# Patient Record
Sex: FEMALE | Race: BLACK OR AFRICAN AMERICAN | Employment: OTHER | URBAN - METROPOLITAN AREA
[De-identification: names, ages, dates, MRNs, and addresses within clinical notes are randomized per-mention and may not be internally consistent; named-entity substitution may affect disease eponyms.]

---

## 2017-02-09 DIAGNOSIS — I10 ESSENTIAL HYPERTENSION: Primary | ICD-10-CM

## 2017-02-10 RX ORDER — DILTIAZEM HYDROCHLORIDE 240 MG/1
CAPSULE, EXTENDED RELEASE ORAL
Qty: 90 CAPSULE | Refills: 5 | Status: SHIPPED | OUTPATIENT
Start: 2017-02-10 | End: 2018-05-14 | Stop reason: SDUPTHER

## 2017-04-12 ENCOUNTER — OFFICE VISIT (OUTPATIENT)
Dept: PRIMARY CARE CLINIC | Age: 77
End: 2017-04-12

## 2017-04-12 VITALS
WEIGHT: 203 LBS | BODY MASS INDEX: 37.13 KG/M2 | TEMPERATURE: 98.7 F | HEART RATE: 76 BPM | RESPIRATION RATE: 18 BRPM | SYSTOLIC BLOOD PRESSURE: 137 MMHG | DIASTOLIC BLOOD PRESSURE: 82 MMHG | OXYGEN SATURATION: 95 %

## 2017-04-12 DIAGNOSIS — Z83.3 FAMILY HISTORY OF TYPE 2 DIABETES MELLITUS: ICD-10-CM

## 2017-04-12 DIAGNOSIS — R07.89 OTHER CHEST PAIN: Primary | ICD-10-CM

## 2017-04-12 DIAGNOSIS — E78.2 MIXED HYPERLIPIDEMIA: ICD-10-CM

## 2017-04-12 DIAGNOSIS — Z23 NEED FOR TDAP VACCINATION: ICD-10-CM

## 2017-04-12 DIAGNOSIS — R07.89 OTHER CHEST PAIN: ICD-10-CM

## 2017-04-12 DIAGNOSIS — G45.9 TRANSIENT CEREBRAL ISCHEMIA, UNSPECIFIED TYPE: ICD-10-CM

## 2017-04-12 LAB
A/G RATIO: 1.4 (ref 1.1–2.2)
ALBUMIN SERPL-MCNC: 4.3 G/DL (ref 3.4–5)
ALP BLD-CCNC: 69 U/L (ref 40–129)
ALT SERPL-CCNC: 23 U/L (ref 10–40)
ANION GAP SERPL CALCULATED.3IONS-SCNC: 17 MMOL/L (ref 3–16)
AST SERPL-CCNC: 29 U/L (ref 15–37)
BILIRUB SERPL-MCNC: 0.6 MG/DL (ref 0–1)
BILIRUBIN URINE: NEGATIVE
BLOOD, URINE: NEGATIVE
BUN BLDV-MCNC: 13 MG/DL (ref 7–20)
CALCIUM SERPL-MCNC: 9.4 MG/DL (ref 8.3–10.6)
CHLORIDE BLD-SCNC: 99 MMOL/L (ref 99–110)
CLARITY: CLEAR
CO2: 23 MMOL/L (ref 21–32)
COLOR: YELLOW
CREAT SERPL-MCNC: 1.1 MG/DL (ref 0.6–1.2)
EPITHELIAL CELLS, UA: 2 /HPF (ref 0–5)
GFR AFRICAN AMERICAN: 58
GFR NON-AFRICAN AMERICAN: 48
GLOBULIN: 3.1 G/DL
GLUCOSE BLD-MCNC: 86 MG/DL (ref 70–99)
GLUCOSE URINE: NEGATIVE MG/DL
HYALINE CASTS: 4 /LPF (ref 0–8)
KETONES, URINE: ABNORMAL MG/DL
LEUKOCYTE ESTERASE, URINE: ABNORMAL
MICROSCOPIC EXAMINATION: YES
NITRITE, URINE: NEGATIVE
PH UA: 6
POTASSIUM SERPL-SCNC: 4.2 MMOL/L (ref 3.5–5.1)
PROTEIN UA: NEGATIVE MG/DL
RBC UA: 2 /HPF (ref 0–4)
SODIUM BLD-SCNC: 139 MMOL/L (ref 136–145)
SPECIFIC GRAVITY UA: 1.02
TOTAL PROTEIN: 7.4 G/DL (ref 6.4–8.2)
TSH REFLEX FT4: 1.4 UIU/ML (ref 0.27–4.2)
URINE TYPE: ABNORMAL
UROBILINOGEN, URINE: 0.2 E.U./DL
WBC UA: 1 /HPF (ref 0–5)

## 2017-04-12 PROCEDURE — 93000 ELECTROCARDIOGRAM COMPLETE: CPT | Performed by: INTERNAL MEDICINE

## 2017-04-12 PROCEDURE — 99214 OFFICE O/P EST MOD 30 MIN: CPT | Performed by: INTERNAL MEDICINE

## 2017-04-12 RX ORDER — FLUVASTATIN 20 MG/1
20 CAPSULE ORAL NIGHTLY
Qty: 30 CAPSULE | Refills: 5 | Status: SHIPPED | OUTPATIENT
Start: 2017-04-12 | End: 2017-04-19 | Stop reason: SDUPTHER

## 2017-04-12 RX ORDER — RANITIDINE 150 MG/1
150 TABLET ORAL 2 TIMES DAILY
Qty: 60 TABLET | Refills: 3 | Status: SHIPPED | OUTPATIENT
Start: 2017-04-12 | End: 2017-04-26 | Stop reason: SDUPTHER

## 2017-04-13 LAB
BASOPHILS ABSOLUTE: 0 K/UL (ref 0–0.2)
BASOPHILS RELATIVE PERCENT: 0.4 %
EOSINOPHILS ABSOLUTE: 0.1 K/UL (ref 0–0.6)
EOSINOPHILS RELATIVE PERCENT: 1.4 %
ESTIMATED AVERAGE GLUCOSE: 122.6 MG/DL
HBA1C MFR BLD: 5.9 %
HCT VFR BLD CALC: 40.8 % (ref 36–48)
HEMOGLOBIN: 13.2 G/DL (ref 12–16)
LYMPHOCYTES ABSOLUTE: 2.2 K/UL (ref 1–5.1)
LYMPHOCYTES RELATIVE PERCENT: 42.4 %
MCH RBC QN AUTO: 28.2 PG (ref 26–34)
MCHC RBC AUTO-ENTMCNC: 32.4 G/DL (ref 31–36)
MCV RBC AUTO: 87.1 FL (ref 80–100)
MONOCYTES ABSOLUTE: 0.4 K/UL (ref 0–1.3)
MONOCYTES RELATIVE PERCENT: 6.9 %
NEUTROPHILS ABSOLUTE: 2.6 K/UL (ref 1.7–7.7)
NEUTROPHILS RELATIVE PERCENT: 48.9 %
PDW BLD-RTO: 13.8 % (ref 12.4–15.4)
PLATELET # BLD: 272 K/UL (ref 135–450)
PMV BLD AUTO: 8.5 FL (ref 5–10.5)
RBC # BLD: 4.69 M/UL (ref 4–5.2)
WBC # BLD: 5.3 K/UL (ref 4–11)

## 2017-04-16 RX ORDER — ASPIRIN 81 MG/1
81 TABLET ORAL DAILY
Qty: 30 TABLET | Refills: 5 | Status: SHIPPED | OUTPATIENT
Start: 2017-04-16 | End: 2017-05-25

## 2017-04-16 ASSESSMENT — ENCOUNTER SYMPTOMS
DIARRHEA: 0
SHORTNESS OF BREATH: 0
EYE PAIN: 0
RHINORRHEA: 0
COUGH: 0
EYE ITCHING: 0
SORE THROAT: 0
ABDOMINAL PAIN: 0
WHEEZING: 0
VOMITING: 0
CONSTIPATION: 0
NAUSEA: 0
SINUS PRESSURE: 0
COLOR CHANGE: 0
CHEST TIGHTNESS: 1
TROUBLE SWALLOWING: 0

## 2017-04-17 ENCOUNTER — HOSPITAL ENCOUNTER (OUTPATIENT)
Dept: CT IMAGING | Age: 77
Discharge: HOME | End: 2017-04-17
Attending: INTERNAL MEDICINE | Admitting: INTERNAL MEDICINE

## 2017-04-17 DIAGNOSIS — G45.9 TRANSIENT CEREBRAL ISCHEMIA, UNSPECIFIED TYPE: Primary | ICD-10-CM

## 2017-04-17 DIAGNOSIS — R07.89 OTHER CHEST PAIN: ICD-10-CM

## 2017-04-19 DIAGNOSIS — E78.2 MIXED HYPERLIPIDEMIA: ICD-10-CM

## 2017-04-19 RX ORDER — FLUVASTATIN 20 MG/1
20 CAPSULE ORAL NIGHTLY
Qty: 30 CAPSULE | Refills: 5 | Status: SHIPPED | OUTPATIENT
Start: 2017-04-19 | End: 2018-04-11

## 2017-04-24 ENCOUNTER — TELEPHONE (OUTPATIENT)
Dept: PRIMARY CARE CLINIC | Age: 77
End: 2017-04-24

## 2017-04-26 ENCOUNTER — TELEPHONE (OUTPATIENT)
Dept: PRIMARY CARE CLINIC | Age: 77
End: 2017-04-26

## 2017-04-26 DIAGNOSIS — R07.89 OTHER CHEST PAIN: ICD-10-CM

## 2017-04-26 RX ORDER — RANITIDINE 150 MG/1
150 TABLET ORAL 2 TIMES DAILY
Qty: 180 TABLET | Refills: 1 | Status: SHIPPED | OUTPATIENT
Start: 2017-04-26 | End: 2017-04-27 | Stop reason: SDUPTHER

## 2017-04-26 RX ORDER — SPIRONOLACTONE 25 MG/1
25 TABLET ORAL DAILY
Qty: 90 TABLET | Refills: 1 | Status: SHIPPED | OUTPATIENT
Start: 2017-04-26 | End: 2017-04-27 | Stop reason: SDUPTHER

## 2017-04-27 DIAGNOSIS — R07.89 OTHER CHEST PAIN: ICD-10-CM

## 2017-04-27 DIAGNOSIS — R06.09 DYSPNEA ON EXERTION: Primary | ICD-10-CM

## 2017-04-27 RX ORDER — RANITIDINE 150 MG/1
150 TABLET ORAL 2 TIMES DAILY
Qty: 180 TABLET | Refills: 1 | Status: SHIPPED | OUTPATIENT
Start: 2017-04-27 | End: 2018-04-11

## 2017-04-27 RX ORDER — SPIRONOLACTONE 25 MG/1
25 TABLET ORAL DAILY
Qty: 90 TABLET | Refills: 1 | Status: SHIPPED | OUTPATIENT
Start: 2017-04-27 | End: 2018-05-09 | Stop reason: SDUPTHER

## 2017-05-04 ENCOUNTER — HOSPITAL ENCOUNTER (OUTPATIENT)
Dept: NON INVASIVE DIAGNOSTICS | Age: 77
Discharge: HOME | End: 2017-05-04
Attending: INTERNAL MEDICINE | Admitting: INTERNAL MEDICINE

## 2017-05-04 DIAGNOSIS — R06.09 OTHER FORMS OF DYSPNEA: ICD-10-CM

## 2017-05-04 LAB
LV EF: 58 %
LVEF MODALITY: NORMAL

## 2017-05-05 ENCOUNTER — TELEPHONE (OUTPATIENT)
Dept: PRIMARY CARE CLINIC | Age: 77
End: 2017-05-05

## 2017-05-22 ENCOUNTER — TELEPHONE (OUTPATIENT)
Dept: PRIMARY CARE CLINIC | Age: 77
End: 2017-05-22

## 2017-05-24 ENCOUNTER — TELEPHONE (OUTPATIENT)
Dept: PRIMARY CARE CLINIC | Age: 77
End: 2017-05-24

## 2017-05-24 ENCOUNTER — OFFICE VISIT (OUTPATIENT)
Dept: PRIMARY CARE CLINIC | Age: 77
End: 2017-05-24

## 2017-05-24 VITALS
SYSTOLIC BLOOD PRESSURE: 129 MMHG | RESPIRATION RATE: 18 BRPM | BODY MASS INDEX: 37.49 KG/M2 | WEIGHT: 205 LBS | HEART RATE: 70 BPM | DIASTOLIC BLOOD PRESSURE: 76 MMHG | OXYGEN SATURATION: 97 % | TEMPERATURE: 97.1 F

## 2017-05-24 DIAGNOSIS — N28.9 RENAL INSUFFICIENCY: ICD-10-CM

## 2017-05-24 DIAGNOSIS — I20.9 ISCHEMIC CHEST PAIN (HCC): ICD-10-CM

## 2017-05-24 DIAGNOSIS — R13.13 PHARYNGEAL DYSPHAGIA: ICD-10-CM

## 2017-05-24 DIAGNOSIS — N20.0 KIDNEY STONE ON LEFT SIDE: Primary | ICD-10-CM

## 2017-05-24 DIAGNOSIS — I27.20 PULMONARY HYPERTENSION (HCC): ICD-10-CM

## 2017-05-24 PROCEDURE — 99214 OFFICE O/P EST MOD 30 MIN: CPT | Performed by: INTERNAL MEDICINE

## 2017-05-25 ENCOUNTER — OFFICE VISIT (OUTPATIENT)
Dept: CARDIOLOGY CLINIC | Age: 77
End: 2017-05-25

## 2017-05-25 VITALS
WEIGHT: 204 LBS | DIASTOLIC BLOOD PRESSURE: 60 MMHG | HEART RATE: 70 BPM | SYSTOLIC BLOOD PRESSURE: 104 MMHG | BODY MASS INDEX: 37.31 KG/M2

## 2017-05-25 DIAGNOSIS — I10 ESSENTIAL HYPERTENSION: Primary | ICD-10-CM

## 2017-05-25 DIAGNOSIS — Z01.810 PREOP CARDIOVASCULAR EXAM: ICD-10-CM

## 2017-05-25 PROCEDURE — 99203 OFFICE O/P NEW LOW 30 MIN: CPT | Performed by: INTERNAL MEDICINE

## 2017-05-25 PROCEDURE — 93000 ELECTROCARDIOGRAM COMPLETE: CPT | Performed by: INTERNAL MEDICINE

## 2017-05-26 ENCOUNTER — HOSPITAL ENCOUNTER (OUTPATIENT)
Dept: VASCULAR LAB | Age: 77
Discharge: HOME | End: 2017-05-26
Attending: INTERNAL MEDICINE | Admitting: INTERNAL MEDICINE

## 2017-05-26 DIAGNOSIS — I27.20 PULMONARY HYPERTENSION (HCC): ICD-10-CM

## 2017-05-26 DIAGNOSIS — N20.0 KIDNEY STONE ON LEFT SIDE: ICD-10-CM

## 2017-05-26 DIAGNOSIS — N20.0 CALCULUS OF KIDNEY: ICD-10-CM

## 2017-05-26 DIAGNOSIS — R60.0 BILATERAL LEG EDEMA: Primary | ICD-10-CM

## 2017-05-30 ASSESSMENT — ENCOUNTER SYMPTOMS
RHINORRHEA: 0
EYE ITCHING: 0
FLATUS: 1
SHORTNESS OF BREATH: 0
HOARSE VOICE: 1
DIARRHEA: 0
BACK PAIN: 1
HEMOPTYSIS: 0
WHEEZING: 0
ABDOMINAL PAIN: 0
CHEST TIGHTNESS: 1
CONSTIPATION: 0
EYE PAIN: 0
ORTHOPNEA: 0
COUGH: 0
TROUBLE SWALLOWING: 0
COLOR CHANGE: 0
STRIDOR: 0
BELCHING: 0
NAUSEA: 0
SINUS PRESSURE: 0
HEMATOCHEZIA: 0
VOMITING: 0
WATER BRASH: 0
SORE THROAT: 0

## 2017-05-30 ASSESSMENT — PATIENT HEALTH QUESTIONNAIRE - PHQ9
2. FEELING DOWN, DEPRESSED OR HOPELESS: 0
SUM OF ALL RESPONSES TO PHQ9 QUESTIONS 1 & 2: 0
1. LITTLE INTEREST OR PLEASURE IN DOING THINGS: 0
SUM OF ALL RESPONSES TO PHQ QUESTIONS 1-9: 0

## 2017-06-01 DIAGNOSIS — N28.9 RENAL INSUFFICIENCY: ICD-10-CM

## 2017-06-01 LAB
ALBUMIN SERPL-MCNC: 4.3 G/DL (ref 3.4–5)
ANION GAP SERPL CALCULATED.3IONS-SCNC: 16 MMOL/L (ref 3–16)
BUN BLDV-MCNC: 23 MG/DL (ref 7–20)
CALCIUM SERPL-MCNC: 8.8 MG/DL (ref 8.3–10.6)
CHLORIDE BLD-SCNC: 101 MMOL/L (ref 99–110)
CO2: 23 MMOL/L (ref 21–32)
CREAT SERPL-MCNC: 1.3 MG/DL (ref 0.6–1.2)
GFR AFRICAN AMERICAN: 48
GFR NON-AFRICAN AMERICAN: 40
GLUCOSE BLD-MCNC: 81 MG/DL (ref 70–99)
PHOSPHORUS: 3 MG/DL (ref 2.5–4.9)
POTASSIUM SERPL-SCNC: 4.8 MMOL/L (ref 3.5–5.1)
SODIUM BLD-SCNC: 140 MMOL/L (ref 136–145)

## 2017-06-04 DIAGNOSIS — N28.9 RENAL INSUFFICIENCY: Primary | ICD-10-CM

## 2017-06-07 DIAGNOSIS — N28.9 RENAL INSUFFICIENCY: Primary | ICD-10-CM

## 2017-11-17 ENCOUNTER — TELEPHONE (OUTPATIENT)
Dept: PRIMARY CARE CLINIC | Age: 77
End: 2017-11-17

## 2017-11-17 DIAGNOSIS — F43.21 GRIEF: Primary | ICD-10-CM

## 2017-11-17 DIAGNOSIS — R73.03 PREDIABETES: Primary | ICD-10-CM

## 2017-11-17 DIAGNOSIS — I10 ESSENTIAL HYPERTENSION: ICD-10-CM

## 2017-11-17 DIAGNOSIS — E55.9 VITAMIN D DEFICIENCY: ICD-10-CM

## 2017-11-17 RX ORDER — CITALOPRAM 10 MG/1
10 TABLET ORAL DAILY
Qty: 30 TABLET | Refills: 3 | Status: SHIPPED | OUTPATIENT
Start: 2017-11-17 | End: 2017-12-07

## 2017-11-17 NOTE — TELEPHONE ENCOUNTER
Patient calling in today and would like to see DR. Zoraida Crandall for ov to discuss about her  passing last night and needs to come in to talk about it. Also she could go do blood work ahead of time before appt. cb patient if can be fitted in next week.  cb pt

## 2017-11-18 LAB
BACTERIA: ABNORMAL /HPF
EPITHELIAL CELLS, UA: 3 /HPF (ref 0–5)
HYALINE CASTS: 0 /LPF (ref 0–8)
RBC UA: 3 /HPF (ref 0–4)
WBC UA: 8 /HPF (ref 0–5)

## 2017-11-22 ENCOUNTER — TELEPHONE (OUTPATIENT)
Dept: PRIMARY CARE CLINIC | Age: 77
End: 2017-11-22

## 2017-11-22 RX ORDER — SERTRALINE HYDROCHLORIDE 25 MG/1
25 TABLET, FILM COATED ORAL DAILY
Qty: 30 TABLET | Refills: 3 | Status: SHIPPED | OUTPATIENT
Start: 2017-11-22 | End: 2018-04-11

## 2017-11-22 NOTE — TELEPHONE ENCOUNTER
Patient states she is afraid to take citalopram (CELEXA) 10 MG tablet and would like to know if you would prescribe something different.

## 2017-11-22 NOTE — TELEPHONE ENCOUNTER
Patient reports that her family through away her Celexa prescription because patient reports the pharmacist said that it might cause suicidal ideation and felt stop it abruptly. I explained the patient that if he stopped it abruptly after being on it a long time you might get vertigo no serious side effects and explain it was a 10 I want her to take it the prescription has been thrown away. Patient is going to grief reaction over the death of her . We'll switch to Zoloft and this was sent to pharmacy.

## 2017-12-07 ENCOUNTER — TELEPHONE (OUTPATIENT)
Dept: PRIMARY CARE CLINIC | Age: 77
End: 2017-12-07

## 2017-12-07 DIAGNOSIS — B96.89 ACUTE BRONCHITIS, BACTERIAL: Primary | ICD-10-CM

## 2017-12-07 DIAGNOSIS — J20.8 ACUTE BRONCHITIS, BACTERIAL: Primary | ICD-10-CM

## 2017-12-07 RX ORDER — AZITHROMYCIN 250 MG/1
250 TABLET, FILM COATED ORAL DAILY
Qty: 10 TABLET | Refills: 0 | Status: SHIPPED | OUTPATIENT
Start: 2017-12-07 | End: 2017-12-17

## 2018-04-05 ENCOUNTER — TELEPHONE (OUTPATIENT)
Dept: PRIMARY CARE CLINIC | Age: 78
End: 2018-04-05

## 2018-04-05 DIAGNOSIS — R73.03 PREDIABETES: ICD-10-CM

## 2018-04-05 DIAGNOSIS — E78.2 MIXED HYPERLIPIDEMIA: ICD-10-CM

## 2018-04-05 DIAGNOSIS — E55.9 VITAMIN D DEFICIENCY: Primary | ICD-10-CM

## 2018-04-09 DIAGNOSIS — E78.2 MIXED HYPERLIPIDEMIA: ICD-10-CM

## 2018-04-09 DIAGNOSIS — R73.03 PREDIABETES: ICD-10-CM

## 2018-04-09 DIAGNOSIS — E55.9 VITAMIN D DEFICIENCY: ICD-10-CM

## 2018-04-09 LAB
BASOPHILS ABSOLUTE: 0 K/UL (ref 0–0.2)
BASOPHILS RELATIVE PERCENT: 0.4 %
CREATININE URINE: 82.5 MG/DL (ref 28–259)
EOSINOPHILS ABSOLUTE: 0.1 K/UL (ref 0–0.6)
EOSINOPHILS RELATIVE PERCENT: 1.6 %
HCT VFR BLD CALC: 38.4 % (ref 36–48)
HEMOGLOBIN: 12.8 G/DL (ref 12–16)
LYMPHOCYTES ABSOLUTE: 3.4 K/UL (ref 1–5.1)
LYMPHOCYTES RELATIVE PERCENT: 50.3 %
MCH RBC QN AUTO: 27.7 PG (ref 26–34)
MCHC RBC AUTO-ENTMCNC: 33.4 G/DL (ref 31–36)
MCV RBC AUTO: 82.9 FL (ref 80–100)
MICROALBUMIN UR-MCNC: <1.2 MG/DL
MICROALBUMIN/CREAT UR-RTO: NORMAL MG/G (ref 0–30)
MONOCYTES ABSOLUTE: 0.5 K/UL (ref 0–1.3)
MONOCYTES RELATIVE PERCENT: 7.1 %
NEUTROPHILS ABSOLUTE: 2.8 K/UL (ref 1.7–7.7)
NEUTROPHILS RELATIVE PERCENT: 40.6 %
PDW BLD-RTO: 14.4 % (ref 12.4–15.4)
PLATELET # BLD: 316 K/UL (ref 135–450)
PMV BLD AUTO: 8 FL (ref 5–10.5)
RBC # BLD: 4.63 M/UL (ref 4–5.2)
WBC # BLD: 6.8 K/UL (ref 4–11)

## 2018-04-10 LAB
A/G RATIO: 1.4 (ref 1.1–2.2)
ALBUMIN SERPL-MCNC: 4.2 G/DL (ref 3.4–5)
ALP BLD-CCNC: 79 U/L (ref 40–129)
ALT SERPL-CCNC: 14 U/L (ref 10–40)
ANION GAP SERPL CALCULATED.3IONS-SCNC: 14 MMOL/L (ref 3–16)
AST SERPL-CCNC: 17 U/L (ref 15–37)
BILIRUB SERPL-MCNC: 0.5 MG/DL (ref 0–1)
BUN BLDV-MCNC: 14 MG/DL (ref 7–20)
CALCIUM SERPL-MCNC: 9.2 MG/DL (ref 8.3–10.6)
CHLORIDE BLD-SCNC: 102 MMOL/L (ref 99–110)
CHOLESTEROL, TOTAL: 265 MG/DL (ref 0–199)
CO2: 26 MMOL/L (ref 21–32)
CREAT SERPL-MCNC: 1.1 MG/DL (ref 0.6–1.2)
ESTIMATED AVERAGE GLUCOSE: 128.4 MG/DL
GFR AFRICAN AMERICAN: 58
GFR NON-AFRICAN AMERICAN: 48
GLOBULIN: 3.1 G/DL
GLUCOSE BLD-MCNC: 96 MG/DL (ref 70–99)
HBA1C MFR BLD: 6.1 %
HDLC SERPL-MCNC: 91 MG/DL (ref 40–60)
LDL CHOLESTEROL CALCULATED: 161 MG/DL
POTASSIUM SERPL-SCNC: 4.6 MMOL/L (ref 3.5–5.1)
SODIUM BLD-SCNC: 142 MMOL/L (ref 136–145)
TOTAL PROTEIN: 7.3 G/DL (ref 6.4–8.2)
TRIGL SERPL-MCNC: 67 MG/DL (ref 0–150)
TSH REFLEX FT4: 2.11 UIU/ML (ref 0.27–4.2)
VITAMIN D 25-HYDROXY: 20.9 NG/ML
VLDLC SERPL CALC-MCNC: 13 MG/DL

## 2018-04-11 ENCOUNTER — OFFICE VISIT (OUTPATIENT)
Dept: PRIMARY CARE CLINIC | Age: 78
End: 2018-04-11

## 2018-04-11 VITALS
TEMPERATURE: 98 F | OXYGEN SATURATION: 96 % | HEART RATE: 76 BPM | SYSTOLIC BLOOD PRESSURE: 115 MMHG | DIASTOLIC BLOOD PRESSURE: 74 MMHG | HEIGHT: 62 IN | WEIGHT: 193 LBS | BODY MASS INDEX: 35.51 KG/M2

## 2018-04-11 DIAGNOSIS — K64.9 HEMORRHOIDS, UNSPECIFIED HEMORRHOID TYPE: ICD-10-CM

## 2018-04-11 DIAGNOSIS — I10 ESSENTIAL HYPERTENSION: Primary | ICD-10-CM

## 2018-04-11 DIAGNOSIS — R07.81 RIB PAIN ON LEFT SIDE: ICD-10-CM

## 2018-04-11 DIAGNOSIS — E55.9 VITAMIN D DEFICIENCY: ICD-10-CM

## 2018-04-11 DIAGNOSIS — G89.29 CHRONIC LEFT SHOULDER PAIN: ICD-10-CM

## 2018-04-11 DIAGNOSIS — R25.2 MUSCLE CRAMPS: ICD-10-CM

## 2018-04-11 DIAGNOSIS — E78.2 MIXED HYPERLIPIDEMIA: ICD-10-CM

## 2018-04-11 DIAGNOSIS — M25.512 CHRONIC LEFT SHOULDER PAIN: ICD-10-CM

## 2018-04-11 DIAGNOSIS — Z12.31 ENCOUNTER FOR SCREENING MAMMOGRAM FOR BREAST CANCER: ICD-10-CM

## 2018-04-11 DIAGNOSIS — I27.20 PULMONARY HYPERTENSION (HCC): ICD-10-CM

## 2018-04-11 PROCEDURE — 99214 OFFICE O/P EST MOD 30 MIN: CPT | Performed by: INTERNAL MEDICINE

## 2018-04-11 RX ORDER — ACETAMINOPHEN 160 MG
1 TABLET,DISINTEGRATING ORAL DAILY
Qty: 30 CAPSULE | Refills: 5 | Status: SHIPPED | OUTPATIENT
Start: 2018-04-11 | End: 2020-06-23 | Stop reason: SDUPTHER

## 2018-04-12 ASSESSMENT — ENCOUNTER SYMPTOMS
SORE THROAT: 0
NAUSEA: 0
TROUBLE SWALLOWING: 0
CHEST TIGHTNESS: 0
WHEEZING: 0
EYE PAIN: 0
SHORTNESS OF BREATH: 0
COLOR CHANGE: 0
COUGH: 0
ABDOMINAL PAIN: 0
DIARRHEA: 0
SINUS PRESSURE: 0
BACK PAIN: 1
RHINORRHEA: 0
EYE ITCHING: 0
VOMITING: 0
CONSTIPATION: 0

## 2018-05-01 ENCOUNTER — HOSPITAL ENCOUNTER (OUTPATIENT)
Dept: OTHER | Age: 78
Discharge: HOME | End: 2018-05-01
Attending: INTERNAL MEDICINE | Admitting: INTERNAL MEDICINE

## 2018-05-01 DIAGNOSIS — R07.81 RIB PAIN ON LEFT SIDE: ICD-10-CM

## 2018-05-07 ENCOUNTER — HOSPITAL ENCOUNTER (OUTPATIENT)
Dept: NON INVASIVE DIAGNOSTICS | Age: 78
Discharge: HOME | End: 2018-05-07
Attending: INTERNAL MEDICINE | Admitting: INTERNAL MEDICINE

## 2018-05-07 DIAGNOSIS — I27.20 PULMONARY HYPERTENSION (HCC): ICD-10-CM

## 2018-05-07 LAB
LV EF: 63 %
LVEF MODALITY: NORMAL

## 2018-05-08 DIAGNOSIS — I34.0 MODERATE MITRAL INSUFFICIENCY: Primary | ICD-10-CM

## 2018-05-08 DIAGNOSIS — I27.20 PULMONARY HYPERTENSION (HCC): ICD-10-CM

## 2018-05-10 RX ORDER — SPIRONOLACTONE 25 MG/1
TABLET ORAL
Qty: 90 TABLET | Refills: 1 | Status: SHIPPED | OUTPATIENT
Start: 2018-05-10 | End: 2019-09-03 | Stop reason: SDUPTHER

## 2018-05-14 DIAGNOSIS — I10 ESSENTIAL HYPERTENSION: ICD-10-CM

## 2018-05-15 RX ORDER — DILTIAZEM HYDROCHLORIDE 240 MG/1
CAPSULE, EXTENDED RELEASE ORAL
Qty: 90 CAPSULE | Refills: 5 | Status: SHIPPED | OUTPATIENT
Start: 2018-05-15 | End: 2019-07-23 | Stop reason: SDUPTHER

## 2018-06-25 ENCOUNTER — OFFICE VISIT (OUTPATIENT)
Dept: PRIMARY CARE CLINIC | Age: 78
End: 2018-06-25

## 2018-06-25 VITALS
SYSTOLIC BLOOD PRESSURE: 144 MMHG | HEART RATE: 75 BPM | HEIGHT: 62 IN | RESPIRATION RATE: 16 BRPM | TEMPERATURE: 97.4 F | DIASTOLIC BLOOD PRESSURE: 83 MMHG | OXYGEN SATURATION: 98 % | WEIGHT: 196.4 LBS | BODY MASS INDEX: 36.14 KG/M2

## 2018-06-25 DIAGNOSIS — K64.9 HEMORRHOIDS, UNSPECIFIED HEMORRHOID TYPE: Primary | ICD-10-CM

## 2018-06-25 DIAGNOSIS — Z01.818 PRE-OP EXAM: ICD-10-CM

## 2018-06-25 PROCEDURE — 99203 OFFICE O/P NEW LOW 30 MIN: CPT | Performed by: FAMILY MEDICINE

## 2018-06-25 PROCEDURE — 93000 ELECTROCARDIOGRAM COMPLETE: CPT | Performed by: FAMILY MEDICINE

## 2018-06-25 RX ORDER — POLYETHYLENE GLYCOL 3350 17 G/17G
17 POWDER, FOR SOLUTION ORAL DAILY
COMMUNITY
End: 2019-08-09

## 2018-08-10 ENCOUNTER — OFFICE VISIT (OUTPATIENT)
Dept: PRIMARY CARE CLINIC | Age: 78
End: 2018-08-10

## 2018-08-10 VITALS
OXYGEN SATURATION: 98 % | RESPIRATION RATE: 18 BRPM | HEART RATE: 78 BPM | DIASTOLIC BLOOD PRESSURE: 77 MMHG | SYSTOLIC BLOOD PRESSURE: 136 MMHG | BODY MASS INDEX: 35.48 KG/M2 | TEMPERATURE: 98 F | WEIGHT: 194 LBS

## 2018-08-10 DIAGNOSIS — R73.03 PREDIABETES: ICD-10-CM

## 2018-08-10 DIAGNOSIS — E78.2 MIXED HYPERLIPIDEMIA: ICD-10-CM

## 2018-08-10 DIAGNOSIS — N20.0 RENAL CALCULUS: ICD-10-CM

## 2018-08-10 DIAGNOSIS — I10 ESSENTIAL HYPERTENSION: Primary | ICD-10-CM

## 2018-08-10 DIAGNOSIS — E55.9 VITAMIN D DEFICIENCY: ICD-10-CM

## 2018-08-10 PROCEDURE — 99214 OFFICE O/P EST MOD 30 MIN: CPT | Performed by: INTERNAL MEDICINE

## 2018-08-10 RX ORDER — OXYCODONE HYDROCHLORIDE AND ACETAMINOPHEN 5; 325 MG/1; MG/1
TABLET ORAL
COMMUNITY
Start: 2018-07-10 | End: 2019-08-09

## 2018-08-10 ASSESSMENT — PATIENT HEALTH QUESTIONNAIRE - PHQ9
2. FEELING DOWN, DEPRESSED OR HOPELESS: 0
SUM OF ALL RESPONSES TO PHQ9 QUESTIONS 1 & 2: 0
SUM OF ALL RESPONSES TO PHQ QUESTIONS 1-9: 0
1. LITTLE INTEREST OR PLEASURE IN DOING THINGS: 0
SUM OF ALL RESPONSES TO PHQ QUESTIONS 1-9: 0

## 2018-08-11 DIAGNOSIS — E78.2 MIXED HYPERLIPIDEMIA: ICD-10-CM

## 2018-08-11 LAB
CHOLESTEROL, TOTAL: 262 MG/DL (ref 0–199)
HDLC SERPL-MCNC: 76 MG/DL (ref 40–60)
LDL CHOLESTEROL CALCULATED: 174 MG/DL
TRIGL SERPL-MCNC: 60 MG/DL (ref 0–150)
VLDLC SERPL CALC-MCNC: 12 MG/DL

## 2018-08-14 ASSESSMENT — ENCOUNTER SYMPTOMS
SHORTNESS OF BREATH: 0
SORE THROAT: 0
CONSTIPATION: 0
WHEEZING: 0
TROUBLE SWALLOWING: 0
DIARRHEA: 0
EYE ITCHING: 0
VOMITING: 0
EYE PAIN: 0
ABDOMINAL PAIN: 0
BACK PAIN: 0
COUGH: 0
RHINORRHEA: 0
NAUSEA: 0
COLOR CHANGE: 0
SINUS PRESSURE: 0
CHEST TIGHTNESS: 0

## 2018-08-14 NOTE — PROGRESS NOTES
 Internal hemorrhoid, bleeding    Liver mass    Screening mammogram, encounter for    Renal calculus    Sciatica    Asthma exacerbation    Mixed hyperlipidemia    Bilateral carpal tunnel syndrome    Essential hypertension    Pulmonary hypertension (HCC)    Preop cardiovascular exam    Moderate mitral insufficiency       Review of Systems   Constitutional: Negative for activity change, appetite change, chills, diaphoresis, fatigue, fever and unexpected weight change. HENT: Negative for congestion, dental problem, drooling, ear discharge, ear pain, hearing loss, nosebleeds, postnasal drip, rhinorrhea, sinus pressure, sneezing, sore throat and trouble swallowing. Eyes: Negative for pain, itching and visual disturbance. Respiratory: Negative for cough, chest tightness, shortness of breath and wheezing. Long history of asthma. Cardiovascular: Negative for chest pain, palpitations and leg swelling. Long history of htn and hyperlipidemia. Gastrointestinal: Negative for abdominal pain, constipation, diarrhea, nausea and vomiting. Colonoscopy with Dr. Greyson Ocampo in March of 2011. Pt states that pain has decreased due to current ATB treatment. Endocrine: Negative for cold intolerance, heat intolerance, polydipsia, polyphagia and polyuria. Genitourinary: Negative for difficulty urinating, dysuria, flank pain, frequency, hematuria, menstrual problem, pelvic pain and vaginal bleeding. Urge incontinence. Complete hysterectomy in 1994,   Musculoskeletal: Negative for arthralgias, back pain, gait problem, myalgias, neck pain and neck stiffness. Osteoarthritis of knees doing well for two years after cartilage injection with Dr. Sena Cortes. Skin: Negative for color change, pallor, rash and wound. Allergic/Immunologic: Negative for environmental allergies and food allergies.    Neurological: Negative for dizziness, tremors, seizures, syncope,

## 2018-08-17 DIAGNOSIS — E78.2 MIXED HYPERLIPIDEMIA: Primary | ICD-10-CM

## 2018-08-17 RX ORDER — COLESEVELAM 180 1/1
1875 TABLET ORAL 2 TIMES DAILY WITH MEALS
Qty: 180 TABLET | Refills: 3 | Status: SHIPPED | OUTPATIENT
Start: 2018-08-17 | End: 2019-08-09 | Stop reason: SINTOL

## 2018-09-17 NOTE — PROGRESS NOTES
Disease Brother 65        cad       Allergies   Allergies   Allergen Reactions    Latex Itching    Crestor [Rosuvastatin Calcium] Other (See Comments)     Muscle pain    Pcn [Penicillins] Anaphylaxis    Eggs [Egg White]     Flagyl [Metronidazole] Diarrhea     Blood in stool    Lipitor      Muscle pain.  Motrin [Ibuprofen Micronized] Other (See Comments)     Head tightness    Nsaids     Sulfa Antibiotics      hypertension    Aspirin Nausea And Vomiting    Imidazole Antifungals Nausea And Vomiting and Other (See Comments)    Quinolones Nausea And Vomiting       Medications:     Home Medications:  Were reviewed and are listed in nursing record. and/or listed below    Prior to Admission medications    Medication Sig Start Date End Date Taking? Authorizing Provider   [facility]) 625 MG tablet Take 3 tablets by mouth 2 times daily (with meals) 8/17/18  Yes Danay Devries MD   oxyCODONE-acetaminophen (PERCOCET) 5-325 MG per tablet  7/10/18  Yes Historical Provider, MD   polyethylene glycol (MIRALAX) powder Take 17 g by mouth daily   Yes Historical Provider, MD   DILT- MG extended release capsule TAKE 1 CAPSULE DAILY 5/15/18  Yes Danay Devries MD   PROCTOZONE-HC 2.5 % rectal cream APPLY TWICE A DAY AS NEEDED 5/10/18  Yes Danay Devries MD   spironolactone (ALDACTONE) 25 MG tablet TAKE 1 TABLET DAILY 5/10/18  Yes Danay Devries MD   Cholecalciferol (VITAMIN D3) 2000 units CAPS Take 1 capsule by mouth daily If not covered , will buy over the counter. 4/11/18  Yes Danay Devries MD   pramoxine-zinc oxide in mineral oil (TUCKS HEMORRHOIDAL) 1-12.5 % OINT ointment Place rectally as needed (hemorrhoids flare) 12/13/16  Yes Danay Devries MD   multivitamin [facility]) per tablet Take 1 tablet by mouth daily.      Yes Historical Provider, MD        Review of Systems   Constitutional: Negative for activity change, appetite change, diaphoresis, fatigue, fever and unexpected weight change. HENT: Negative for congestion, facial swelling, mouth sores and nosebleeds. Eyes: Negative for discharge and visual disturbance. Respiratory: Negative for cough, chest tightness, shortness of breath and wheezing. Cardiovascular: Negative for chest pain, palpitations and leg swelling. Gastrointestinal: Negative for abdominal distention, abdominal pain, blood in stool and vomiting. Endocrine: Negative for cold intolerance, heat intolerance and polyuria. Genitourinary: Negative for difficulty urinating, dysuria, frequency and hematuria. Musculoskeletal: Negative for back pain, joint swelling, myalgias and neck pain. Skin: Negative for color change, pallor and rash. Allergic/Immunologic: Negative for immunocompromised state. Neurological: Negative for dizziness, syncope, weakness, light-headedness, numbness and headaches. Hematological: Negative for adenopathy. Does not bruise/bleed easily. Psychiatric/Behavioral: Negative for behavioral problems, confusion, decreased concentration and suicidal ideas. The patient is not nervous/anxious. Vitals:    09/18/18 1111   BP: 120/60   Pulse: 60    Weight: 195 lb 12.8 oz (88.8 kg)       Vitals:    09/18/18 1111   BP: 120/60   Pulse: 60   Weight: 195 lb 12.8 oz (88.8 kg)       BP Readings from Last 3 Encounters:   09/18/18 120/60   08/10/18 136/77   06/25/18 (!) 144/83       Wt Readings from Last 3 Encounters:   09/18/18 195 lb 12.8 oz (88.8 kg)   08/10/18 194 lb (88 kg)   06/25/18 196 lb 6.4 oz (89.1 kg)       Physical Exam   Constitutional: She is oriented to person, place, and time. She appears well-developed and well-nourished. No distress. HENT:   Head: Normocephalic and atraumatic. Eyes: Pupils are equal, round, and reactive to light. EOM are normal.   Neck: Normal range of motion. No JVD present. No thyromegaly present.    Cardiovascular: Normal rate, regular rhythm, S1 normal, S2 normal, normal heart

## 2018-09-18 ENCOUNTER — OFFICE VISIT (OUTPATIENT)
Dept: CARDIOLOGY CLINIC | Age: 78
End: 2018-09-18

## 2018-09-18 VITALS
DIASTOLIC BLOOD PRESSURE: 60 MMHG | BODY MASS INDEX: 35.81 KG/M2 | SYSTOLIC BLOOD PRESSURE: 120 MMHG | WEIGHT: 195.8 LBS | HEART RATE: 60 BPM

## 2018-09-18 DIAGNOSIS — I34.0 MODERATE MITRAL INSUFFICIENCY: ICD-10-CM

## 2018-09-18 DIAGNOSIS — I10 ESSENTIAL HYPERTENSION: ICD-10-CM

## 2018-09-18 PROCEDURE — 99204 OFFICE O/P NEW MOD 45 MIN: CPT | Performed by: INTERNAL MEDICINE

## 2018-09-18 ASSESSMENT — ENCOUNTER SYMPTOMS
EYE DISCHARGE: 0
BLOOD IN STOOL: 0
CHEST TIGHTNESS: 0
SHORTNESS OF BREATH: 0
WHEEZING: 0
VOMITING: 0
COUGH: 0
FACIAL SWELLING: 0
COLOR CHANGE: 0
BACK PAIN: 0
ABDOMINAL PAIN: 0
ABDOMINAL DISTENTION: 0

## 2018-09-26 PROBLEM — Z01.810 PREOP CARDIOVASCULAR EXAM: Status: RESOLVED | Noted: 2017-05-25 | Resolved: 2018-09-26

## 2019-03-18 ENCOUNTER — OFFICE VISIT (OUTPATIENT)
Dept: ORTHOPEDIC SURGERY | Age: 79
End: 2019-03-18
Payer: COMMERCIAL

## 2019-03-18 VITALS
HEART RATE: 68 BPM | BODY MASS INDEX: 34.55 KG/M2 | HEIGHT: 63 IN | DIASTOLIC BLOOD PRESSURE: 77 MMHG | WEIGHT: 195 LBS | SYSTOLIC BLOOD PRESSURE: 143 MMHG

## 2019-03-18 DIAGNOSIS — M12.812 ROTATOR CUFF ARTHROPATHY, LEFT: ICD-10-CM

## 2019-03-18 DIAGNOSIS — M25.512 LEFT SHOULDER PAIN, UNSPECIFIED CHRONICITY: Primary | ICD-10-CM

## 2019-03-18 PROCEDURE — 20610 DRAIN/INJ JOINT/BURSA W/O US: CPT | Performed by: PHYSICIAN ASSISTANT

## 2019-03-18 PROCEDURE — 99203 OFFICE O/P NEW LOW 30 MIN: CPT | Performed by: PHYSICIAN ASSISTANT

## 2019-03-18 NOTE — LETTER
Physical Therapy Rehabilitation Referral    Patient Name:  Christal Jin      YOB: 1940    Diagnosis:    1. Left shoulder pain, unspecified chronicity    2. Rotator cuff arthropathy, left        Precautions:     [x] Evaluate and Treat    Post Op Instructions:  [] Continuous passive motion (CPM) [] Elbow ROM  [x] Exercise in plane of scapula  []  Strengthening     [] Pulley and instruction   [x] Home exercise program (copy to patient)   [] Sling when arm at risk  [] Sling or brace at all times   [] AAROM: Forward elevation to  140            [] AAROM: External rotation  To  40    [] Isometric external rotator strengthening [] AAROM: internal rotation: up the back  [x] Isometric abductor strengthening  [] AAROM: Internal abduction   [] Isometric internal rotator strengthening [] AAROM: cross-body adduction             Stretching:     Strengthening:  [x] Four quadrant (FE, ER, IR, CBA)  [x] Rotator cuff (ER, IR, Abd)  [] Forward Elevation    [] External Rotators     [] External Rotation    [] Internal Rotators  [] Internal Rotation: up/back   [] Abductors     [] Internal Rotation: supine in abduction  [] Sleeper Stretch    [] Flexors  [] Cross-body abduction    [] Extensors  [] Pendulum (FE, Abd/Add, cw/ccw)  [x] Scapular Stabilizers   [] Wall-walking (FE, Abd)        [x] Shoulder shrugs     [] Table slides (FE)                [x] Rhomboid pinch  [] Elbow (flex, ext, pron, sup)        [] Lat.  Pull downs     [] Medial epicondylitis program       [] Forward punch   [] Lateral epicondylitis program       [] Internal rotators     [] Progressive resistive exercises  [] Bench Press        [] Bench press plus  Activities:     [] Lateral pull-downs  [] Rowing     [x] Progressive two-hand supine press  [] Stepper/Exercise bike   [x] Biceps: curls/supination  [] Swimming  [] Water exercises    Modalities:     Return to Sport:  [x] Of Choice      [] Plyometrics

## 2019-03-18 NOTE — PROGRESS NOTES
Depo-Medrol:  NDC: [id number]  Lot #: [id number]  Expiration Date: 1/2021    LIDOCAINE  NDC: 8893-4596-81  LOT: [id number]  EXP: 6/2020    Lt.  Shoulder

## 2019-04-08 ENCOUNTER — TELEPHONE (OUTPATIENT)
Dept: PRIMARY CARE CLINIC | Age: 79
End: 2019-04-08

## 2019-04-08 DIAGNOSIS — E55.9 VITAMIN D DEFICIENCY: ICD-10-CM

## 2019-04-08 DIAGNOSIS — I10 ESSENTIAL HYPERTENSION: Primary | ICD-10-CM

## 2019-04-08 DIAGNOSIS — R73.03 PREDIABETES: ICD-10-CM

## 2019-04-08 DIAGNOSIS — E78.2 MIXED HYPERLIPIDEMIA: ICD-10-CM

## 2019-04-08 DIAGNOSIS — I10 ESSENTIAL HYPERTENSION: ICD-10-CM

## 2019-04-08 DIAGNOSIS — I34.0 MODERATE MITRAL INSUFFICIENCY: ICD-10-CM

## 2019-04-08 LAB
A/G RATIO: 1.2 (ref 1.1–2.2)
ALBUMIN SERPL-MCNC: 3.8 G/DL (ref 3.4–5)
ALP BLD-CCNC: 75 U/L (ref 40–129)
ALT SERPL-CCNC: 12 U/L (ref 10–40)
ANION GAP SERPL CALCULATED.3IONS-SCNC: 13 MMOL/L (ref 3–16)
AST SERPL-CCNC: 13 U/L (ref 15–37)
BASOPHILS ABSOLUTE: 0 K/UL (ref 0–0.2)
BASOPHILS RELATIVE PERCENT: 0.4 %
BILIRUB SERPL-MCNC: 0.6 MG/DL (ref 0–1)
BILIRUBIN URINE: NEGATIVE
BLOOD, URINE: NEGATIVE
BUN BLDV-MCNC: 16 MG/DL (ref 7–20)
CALCIUM SERPL-MCNC: 9 MG/DL (ref 8.3–10.6)
CHLORIDE BLD-SCNC: 108 MMOL/L (ref 99–110)
CHOLESTEROL, TOTAL: 251 MG/DL (ref 0–199)
CLARITY: CLEAR
CO2: 24 MMOL/L (ref 21–32)
COLOR: YELLOW
CREAT SERPL-MCNC: 1 MG/DL (ref 0.6–1.2)
CREATININE URINE: 93.8 MG/DL (ref 28–259)
EOSINOPHILS ABSOLUTE: 0.1 K/UL (ref 0–0.6)
EOSINOPHILS RELATIVE PERCENT: 1.8 %
GFR AFRICAN AMERICAN: >60
GFR NON-AFRICAN AMERICAN: 54
GLOBULIN: 3.2 G/DL
GLUCOSE BLD-MCNC: 101 MG/DL (ref 70–99)
GLUCOSE URINE: NEGATIVE MG/DL
HCT VFR BLD CALC: 38.8 % (ref 36–48)
HDLC SERPL-MCNC: 86 MG/DL (ref 40–60)
HEMOGLOBIN: 12.7 G/DL (ref 12–16)
KETONES, URINE: NEGATIVE MG/DL
LDL CHOLESTEROL CALCULATED: 154 MG/DL
LEUKOCYTE ESTERASE, URINE: NEGATIVE
LYMPHOCYTES ABSOLUTE: 3.3 K/UL (ref 1–5.1)
LYMPHOCYTES RELATIVE PERCENT: 46.8 %
MCH RBC QN AUTO: 27.9 PG (ref 26–34)
MCHC RBC AUTO-ENTMCNC: 32.7 G/DL (ref 31–36)
MCV RBC AUTO: 85.3 FL (ref 80–100)
MICROALBUMIN UR-MCNC: <1.2 MG/DL
MICROALBUMIN/CREAT UR-RTO: NORMAL MG/G (ref 0–30)
MICROSCOPIC EXAMINATION: NORMAL
MONOCYTES ABSOLUTE: 0.5 K/UL (ref 0–1.3)
MONOCYTES RELATIVE PERCENT: 6.6 %
NEUTROPHILS ABSOLUTE: 3.1 K/UL (ref 1.7–7.7)
NEUTROPHILS RELATIVE PERCENT: 44.4 %
NITRITE, URINE: NEGATIVE
PDW BLD-RTO: 14.6 % (ref 12.4–15.4)
PH UA: 6 (ref 5–8)
PLATELET # BLD: 274 K/UL (ref 135–450)
PMV BLD AUTO: 8.3 FL (ref 5–10.5)
POTASSIUM SERPL-SCNC: 4.1 MMOL/L (ref 3.5–5.1)
PROTEIN UA: NEGATIVE MG/DL
RBC # BLD: 4.55 M/UL (ref 4–5.2)
SODIUM BLD-SCNC: 145 MMOL/L (ref 136–145)
SPECIFIC GRAVITY UA: 1.02 (ref 1–1.03)
TOTAL PROTEIN: 7 G/DL (ref 6.4–8.2)
TRIGL SERPL-MCNC: 54 MG/DL (ref 0–150)
TSH REFLEX FT4: 1.55 UIU/ML (ref 0.27–4.2)
URINE TYPE: NORMAL
UROBILINOGEN, URINE: 0.2 E.U./DL
VITAMIN B-12: 403 PG/ML (ref 211–911)
VITAMIN D 25-HYDROXY: 13.8 NG/ML
VLDLC SERPL CALC-MCNC: 11 MG/DL
WBC # BLD: 7 K/UL (ref 4–11)

## 2019-04-09 LAB
ESTIMATED AVERAGE GLUCOSE: 134.1 MG/DL
HBA1C MFR BLD: 6.3 %

## 2019-04-10 ENCOUNTER — OFFICE VISIT (OUTPATIENT)
Dept: PRIMARY CARE CLINIC | Age: 79
End: 2019-04-10
Payer: COMMERCIAL

## 2019-04-10 VITALS
SYSTOLIC BLOOD PRESSURE: 130 MMHG | WEIGHT: 195 LBS | OXYGEN SATURATION: 97 % | HEART RATE: 78 BPM | DIASTOLIC BLOOD PRESSURE: 75 MMHG | TEMPERATURE: 98 F | BODY MASS INDEX: 34.54 KG/M2 | RESPIRATION RATE: 18 BRPM

## 2019-04-10 DIAGNOSIS — I10 ESSENTIAL HYPERTENSION: ICD-10-CM

## 2019-04-10 DIAGNOSIS — Z00.00 ENCOUNTER FOR PREVENTATIVE ADULT HEALTH CARE EXAMINATION: Primary | ICD-10-CM

## 2019-04-10 DIAGNOSIS — E78.2 MIXED HYPERLIPIDEMIA: ICD-10-CM

## 2019-04-10 DIAGNOSIS — L02.412 ABSCESS OF AXILLA, LEFT: ICD-10-CM

## 2019-04-10 PROCEDURE — 99397 PER PM REEVAL EST PAT 65+ YR: CPT | Performed by: INTERNAL MEDICINE

## 2019-04-10 RX ORDER — FENOFIBRATE 160 MG/1
160 TABLET ORAL DAILY
Qty: 30 TABLET | Refills: 5 | Status: SHIPPED | OUTPATIENT
Start: 2019-04-10 | End: 2019-08-08 | Stop reason: SDUPTHER

## 2019-04-10 RX ORDER — EZETIMIBE 10 MG/1
10 TABLET ORAL DAILY
Qty: 30 TABLET | Refills: 3 | Status: SHIPPED | OUTPATIENT
Start: 2019-04-10 | End: 2019-08-08 | Stop reason: SDUPTHER

## 2019-04-10 RX ORDER — DOXYCYCLINE HYCLATE 100 MG
100 TABLET ORAL 2 TIMES DAILY
Qty: 20 TABLET | Refills: 0 | Status: SHIPPED | OUTPATIENT
Start: 2019-04-10 | End: 2019-04-20

## 2019-04-10 ASSESSMENT — ENCOUNTER SYMPTOMS
CONSTIPATION: 0
RHINORRHEA: 0
NAUSEA: 0
CHEST TIGHTNESS: 0
ABDOMINAL PAIN: 0
TROUBLE SWALLOWING: 0
EYE PAIN: 0
COLOR CHANGE: 0
WHEEZING: 0
COUGH: 0
SINUS PRESSURE: 0
SORE THROAT: 0
SHORTNESS OF BREATH: 0
DIARRHEA: 0
EYE ITCHING: 0
VOMITING: 0
BACK PAIN: 0

## 2019-04-10 ASSESSMENT — PATIENT HEALTH QUESTIONNAIRE - PHQ9
SUM OF ALL RESPONSES TO PHQ QUESTIONS 1-9: 0
SUM OF ALL RESPONSES TO PHQ9 QUESTIONS 1 & 2: 0
2. FEELING DOWN, DEPRESSED OR HOPELESS: 0
1. LITTLE INTEREST OR PLEASURE IN DOING THINGS: 0
SUM OF ALL RESPONSES TO PHQ QUESTIONS 1-9: 0

## 2019-04-10 NOTE — PROGRESS NOTES
4/10/2019    Della Hernandez (:  1940) is a 69 y.o. female, here for a preventive medicine evaluation. The 10-year ASCVD risk score (Shante Corea, et al., 2013) is: 25.4%    Values used to calculate the score:      Age: 69 years      Sex: Female      Is Non- : Yes      Diabetic: No      Tobacco smoker: No      Systolic Blood Pressure: 082 mmHg      Is BP treated: Yes      HDL Cholesterol: 86 mg/dL      Total Cholesterol: 251 mg/dL    Patient Active Problem List   Diagnosis    Prediabetes    Vitamin D deficiency    Internal hemorrhoid, bleeding    Liver mass    Renal calculus    Sciatica    Asthma exacerbation    Mixed hyperlipidemia    Bilateral carpal tunnel syndrome    Essential hypertension    Pulmonary hypertension (HCC)    Moderate mitral insufficiency       Review of Systems   Constitutional: Negative for activity change, appetite change, chills, diaphoresis, fatigue, fever and unexpected weight change. HENT: Negative for congestion, dental problem, drooling, ear discharge, ear pain, hearing loss, nosebleeds, postnasal drip, rhinorrhea, sinus pressure, sneezing, sore throat and trouble swallowing. Eyes: Negative for pain, itching and visual disturbance. Respiratory: Negative for cough, chest tightness, shortness of breath and wheezing. Long history of asthma. Cardiovascular: Negative for chest pain, palpitations and leg swelling. Long history of htn and hyperlipidemia. Gastrointestinal: Negative for abdominal pain, constipation, diarrhea, nausea and vomiting. Colonoscopy with Dr. Liudmila Foley in 2011. Pt states that pain has decreased due to current ATB treatment. Endocrine: Negative for cold intolerance, heat intolerance, polydipsia, polyphagia and polyuria.    Genitourinary: Negative for difficulty urinating, dysuria, flank pain, frequency, hematuria, menstrual problem, pelvic pain and vaginal Take 1 tablet by mouth daily. Yes Historical Provider, MD        Allergies   Allergen Reactions    Latex Itching    Crestor [Rosuvastatin Calcium] Other (See Comments)     Muscle pain    Pcn [Penicillins] Anaphylaxis    Eggs [Egg White]     Flagyl [Metronidazole] Diarrhea     Blood in stool    Lipitor      Muscle pain.  Motrin [Ibuprofen Micronized] Other (See Comments)     Head tightness    Nsaids     Sulfa Antibiotics      hypertension    Aspirin Nausea And Vomiting    Imidazole Antifungals Nausea And Vomiting and Other (See Comments)    Quinolones Nausea And Vomiting       Past Medical History:   Diagnosis Date    Allergic rhinitis     Asthma     Chronic back pain     COPD (chronic obstructive pulmonary disease) (HCC)     Fibromyalgia     GERD (gastroesophageal reflux disease)     Hyperlipidemia     Hypertension     Irritable bowel syndrome     Obesity     Osteoarthritis     Osteopenia     Renal calculus 2013    Restless legs syndrome     Sciatica 2014    Urinary incontinence        Past Surgical History:   Procedure Laterality Date    APPENDECTOMY      CHOLECYSTECTOMY      COLONOSCOPY       Dr Enriqueta Verdugo  07/10/2018    at [facility] done by Dr. Linda Kerr, [id number] [facility]       Social History     Socioeconomic History    Marital status:       Spouse name: Not on file    Number of children: Not on file    Years of education: Not on file    Highest education level: Not on file   Occupational History    Not on file   Social Needs    Financial resource strain: Not on file    Food insecurity:     Worry: Not on file     Inability: Not on file    Transportation needs:     Medical: Not on file     Non-medical: Not on file   Tobacco Use    Smoking status: Former Smoker     Last attempt to quit: 1972     Years since quittin.3    Smokeless tobacco: Never Used   Substance and Sexual Activity    Alcohol use: No    Drug use: No    Sexual activity: Never     Partners: Male   Lifestyle    Physical activity:     Days per week: Not on file     Minutes per session: Not on file    Stress: Not on file   Relationships    Social connections:     Talks on phone: Not on file     Gets together: Not on file     Attends [employer] service: Not on file     Active member of club or organization: Not on file     Attends meetings of clubs or organizations: Not on file     Relationship status: Not on file    Intimate partner violence:     Fear of current or ex partner: Not on file     Emotionally abused: Not on file     Physically abused: Not on file     Forced sexual activity: Not on file   Other Topics Concern    Not on file   Social History Narrative    Not on file        Family History   Problem Relation Age of Onset    Arthritis Mother     Cancer Mother     Diabetes Mother     Heart Disease Mother     High Blood Pressure Mother     Kidney Disease Mother     High Cholesterol Mother     Arthritis Father     Cancer Father     Diabetes Father     Heart Disease Father     High Blood Pressure Father     Kidney Disease Father     High Cholesterol Father     Heart Disease Brother 58        coronary artery disease with stent    Heart Disease Brother 65        cad       ADVANCE DIRECTIVE: Y, Not Received    Vitals:    04/10/19 1335   BP: 130/75   Pulse: 78   Resp: 18   Temp: 98 °F (36.7 °C)   TempSrc: Oral   SpO2: 97%   Weight: 195 lb (88.5 kg)     Estimated body mass index is 34.54 kg/m² as calculated from the following:    Height as of 3/18/19: 5' 3\" (1.6 m). Weight as of this encounter: 195 lb (88.5 kg). Physical Exam   Constitutional: She is oriented to person, place, and time. She appears well-developed and well-nourished. No distress. HENT:   Head: Normocephalic and atraumatic.    Right Ear: External ear normal.   Left Ear: External ear normal.   Nose: Nose normal. Mouth/Throat: No oropharyngeal exudate. Tm's intact. Eyes: Pupils are equal, round, and reactive to light. Conjunctivae are normal. Right eye exhibits no discharge. Left eye exhibits no discharge. Neck: Normal range of motion. Neck supple. No tracheal deviation present. No thyromegaly present. Cardiovascular: Normal rate, regular rhythm and intact distal pulses. Exam reveals no gallop. No murmur heard. Pulmonary/Chest: Effort normal and breath sounds normal. No respiratory distress. She has no wheezes. She has no rales. She exhibits no tenderness. Bilateral breast exam is normal and no axillary adenopathy. Boil left axilla. Abdominal: Soft. Bowel sounds are normal. She exhibits no distension and no mass. There is no tenderness. There is no rebound and no guarding. Musculoskeletal: Normal range of motion. She exhibits no edema or tenderness. Neurological: She is alert and oriented to person, place, and time. She has normal reflexes. No cranial nerve deficit. She exhibits normal muscle tone. Coordination normal.   Decreased reflexes of the knees and ankles. Sensation decreased in feet. Skin: Skin is warm and dry. No rash noted. She is not diaphoretic. No erythema. No pallor. Small boil, left axilla. Non toxic appearance. Psychiatric: She has a normal mood and affect. Her behavior is normal. Judgment and thought content normal.   Nursing note and vitals reviewed. No flowsheet data found.     Lab Results   Component Value Date    CHOL 251 04/08/2019    CHOL 262 08/11/2018    CHOL 265 04/09/2018    TRIG 54 04/08/2019    TRIG 60 08/11/2018    TRIG 67 04/09/2018    HDL 86 04/08/2019    HDL 76 08/11/2018    HDL 91 04/09/2018    HDL 72 05/23/2012    HDL 83 01/13/2012    LDLCALC 154 04/08/2019    LDLCALC 174 08/11/2018    LDLCALC 161 04/09/2018    GLUCOSE 101 04/08/2019    LABA1C 6.3 04/08/2019    LABA1C 6.1 04/09/2018    LABA1C 6.4 11/18/2017       The 10-year ASCVD risk score (Shiela Russo Zane Aguilar., et al., 2013) is: 25.4%    Values used to calculate the score:      Age: 69 years      Sex: Female      Is Non- : Yes      Diabetic: No      Tobacco smoker: No      Systolic Blood Pressure: 446 mmHg      Is BP treated: Yes      HDL Cholesterol: 86 mg/dL      Total Cholesterol: 251 mg/dL    Immunization History   Administered Date(s) Administered    Pneumococcal 13-valent Conjugate ([id number]) 09/30/2015    Pneumococcal Polysaccharide ([id number]) 02/23/2012       Health Maintenance   Topic Date Due    DTaP/Tdap/Td vaccine (1 - Tdap) 07/11/1960    Shingles Vaccine (1 of 2) 07/11/1991    Flu vaccine (Season Ended) 09/01/2019    Potassium monitoring  04/08/2020    Creatinine monitoring  04/08/2020    Colon cancer screen colonoscopy  12/07/2027    DEXA (modify frequency per FRAX score)  Completed    Pneumococcal 65+ years Vaccine  Completed       ASSESSMENT/PLAN:   Diagnosis Orders   1. Encounter for preventative adult health care examination preventive medicine labs reviewed with patient. Schedule mammogram  And give fit test.    2. Mixed hyperlipidemia , not controlled, statin intolerant. fenofibrate 160 MG tablet    ezetimibe (ZETIA) 10 MG tablet    Lipid Panel   3. Essential hypertension is controlled on medication, continue. BP Readings from Last 3 Encounters:   04/10/19 130/75   03/18/19 (!) 143/77   09/18/18 120/60       4. Abscess of axilla, left , non toxic. doxycycline hyclate (VIBRA-TABS) 100 MG tablet     Della received counseling on the following healthy behaviors: medication adherence    Patient given educational materials on [employer] over [employer] or on [employer] or You tube. Only use 100 % whole grain flour. Read labels and no high fructose corn syrup. Only use 100 % whole grain pasta and cereals and bread. Fresh or frozen fruits and vegetable. No oils.   When bakes replace oil with unsweetened apple sauce or pumpkin puree  All beans, lentils and whole grain brown rice. Use almond , rice or soy or coconut milk,    No dairy, so no eggs, cheese , milk, yogurt. No fish , poultry, pork ,beef. Buy Polenta instead of grits. Polenta is grits that have not been bleached so retain the fiber and vitamins. Use raw sugar or stevia      I have instructed Della to complete a self tracking handout on Blood Pressures  and Weights and instructed them to bring it with them to her next appointment. Discussed use, benefit, and side effects of prescribed medications. Barriers to medication compliance addressed. All patient questions answered. Pt voiced understanding. An electronic signature was used to authenticate this note.     --Haley Dias MD on 4/10/2019 at 1:46 PM

## 2019-04-10 NOTE — LETTER
[facility]  [address] [address]  Phone: [phone]  Fax: [phone]    Toya Martínez MD        April 10, 2019     Patient: Lashanda Marroquin   YOB: 1940   Date of Visit: 4/10/2019       To Whom It May Concern: It is my medical opinion that Lashanda Marroquin , please give Shingrix and tdap. Immunization History   Administered Date(s) Administered    Pneumococcal 13-valent Conjugate ([id number]) 09/30/2015    Pneumococcal Polysaccharide ([id number]) 02/23/2012     . If you have any questions or concerns, please don't hesitate to call.     Sincerely,          Toya Martínez MD

## 2019-04-17 NOTE — PROGRESS NOTES
[facility]  History and Physical  Shoulder Pain    Date:  2019    Name:  Franklin Lima  Address:  [address] [place] [id number]    :  1940      Age:   69 y.o.    SSN:  [id number]      Medical Record Number:  [id number]    Reason for Visit:    Shoulder Pain (Left shoulder)      HPI:   Franklin Lima is a 69y.o. year old female who presents to our office today complaining of  left shoulder pain. Patient reports that this pain has been present for many years however it got worse over the last recent months. Patient denies any specific injuries that made her symptoms worse however she reports that the pain is getting to the point that she is not sleeping well at nighttime. She constantly hears some grinding and popping in the joint. She rates her pain level is 8/10 VAS. She reports that if she stretches her shoulder or moves it overhead she is having pain with that. Worse aggravating factor is when she is laying down and is unable to sleep on her shoulders. And has tried icing her shoulder, using heat, resting her shoulder and oral anti-inflammatory agents and has not found any relief with either of them. She has also done water aerobics in the past.  In the past she has seen Dr. Santo Elias who has given her corticosteroid injections. She reports that it has been a while since then.     Pain Assessment  Location of Pain: Shoulder  Location Modifiers: Left  Severity of Pain: 8  Quality of Pain: Sharp, Aching, Grinding, Popping, Cracking  Duration of Pain: Persistent  Frequency of Pain: Constant  Date Pain First Started: (Ongoing for years)  Aggravating Factors: Stretching, Straightening(Laying on it)  Limiting Behavior: Yes  Relieving Factors: (Injections)  Result of Injury: No  Work-Related Injury: No  Are there other pain locations you wish to document?: No    Review of Systems:  A 14 point review of systems available in the scanned medical record as documented by the patient. The review is negative with the exception of those things mentioned in the History of Present Illness and Past Medical History. Past History:  Past Medical History:   Diagnosis Date    Allergic rhinitis     Asthma     Chronic back pain     COPD (chronic obstructive pulmonary disease) (HCC)     Fibromyalgia     GERD (gastroesophageal reflux disease)     Hyperlipidemia     Hypertension     Irritable bowel syndrome     Obesity     Osteoarthritis     Osteopenia     Renal calculus 5/6/2013    Restless legs syndrome     Sciatica 7/16/2014    Urinary incontinence      Past Surgical History:   Procedure Laterality Date    APPENDECTOMY      CHOLECYSTECTOMY      COLONOSCOPY  2011     Dr Good Course  07/10/2018    at [facility] done by Dr. Dom Jose, [address] [facility]     Current Outpatient Medications on File Prior to Visit   Medication Sig Dispense Refill    colesevelam (WELCHOL) 625 MG tablet Take 3 tablets by mouth 2 times daily (with meals) 180 tablet 3    oxyCODONE-acetaminophen (PERCOCET) 5-325 MG per tablet       polyethylene glycol (MIRALAX) powder Take 17 g by mouth daily      DILT- MG extended release capsule TAKE 1 CAPSULE DAILY 90 capsule 5    PROCTOZONE-HC 2.5 % rectal cream APPLY TWICE A DAY AS NEEDED 180 g 4    spironolactone (ALDACTONE) 25 MG tablet TAKE 1 TABLET DAILY 90 tablet 1    Cholecalciferol (VITAMIN D3) 2000 units CAPS Take 1 capsule by mouth daily If not covered , will buy over the counter. 30 capsule 5    pramoxine-zinc oxide in mineral oil (TUCKS HEMORRHOIDAL) 1-12.5 % OINT ointment Place rectally as needed (hemorrhoids flare) 3 Tube 1    multivitamin (THERAGRAN) per tablet Take 1 tablet by mouth daily. No current facility-administered medications on file prior to visit.       Social History     Socioeconomic History    Marital status:       Spouse name: Not on file    Number of children: Not on file    Years of education: Not on file    Highest education level: Not on file   Occupational History    Not on file   Social Needs    Financial resource strain: Not on file    Food insecurity:     Worry: Not on file     Inability: Not on file    Transportation needs:     Medical: Not on file     Non-medical: Not on file   Tobacco Use    Smoking status: Former Smoker     Last attempt to quit: 1972     Years since quittin.3    Smokeless tobacco: Never Used   Substance and Sexual Activity    Alcohol use: No    Drug use: No    Sexual activity: Never     Partners: Male   Lifestyle    Physical activity:     Days per week: Not on file     Minutes per session: Not on file    Stress: Not on file   Relationships    Social connections:     Talks on phone: Not on file     Gets together: Not on file     Attends Anabaptist service: Not on file     Active member of club or organization: Not on file     Attends meetings of clubs or organizations: Not on file     Relationship status: Not on file    Intimate partner violence:     Fear of current or ex partner: Not on file     Emotionally abused: Not on file     Physically abused: Not on file     Forced sexual activity: Not on file   Other Topics Concern    Not on file   Social History Narrative    Not on file     Family History   Problem Relation Age of Onset    Arthritis Mother     Cancer Mother     Diabetes Mother     Heart Disease Mother     High Blood Pressure Mother     Kidney Disease Mother     High Cholesterol Mother     Arthritis Father     Cancer Father     Diabetes Father     Heart Disease Father     High Blood Pressure Father     Kidney Disease Father     High Cholesterol Father     Heart Disease Brother 58        coronary artery disease with stent    Heart Disease Brother 65        cad       Current Medications:    Current Outpatient Medications Medication Sig Dispense Refill    fenofibrate 160 MG tablet Take 1 tablet by mouth daily 30 tablet 5    ezetimibe (ZETIA) 10 MG tablet Take 1 tablet by mouth daily 30 tablet 3    doxycycline hyclate (VIBRA-TABS) 100 MG tablet Take 1 tablet by mouth 2 times daily for 10 days 20 tablet 0    colesevelam (WELCHOL) 625 MG tablet Take 3 tablets by mouth 2 times daily (with meals) 180 tablet 3    oxyCODONE-acetaminophen (PERCOCET) 5-325 MG per tablet       polyethylene glycol (MIRALAX) powder Take 17 g by mouth daily      DILT- MG extended release capsule TAKE 1 CAPSULE DAILY 90 capsule 5    PROCTOZONE-HC 2.5 % rectal cream APPLY TWICE A DAY AS NEEDED 180 g 4    spironolactone (ALDACTONE) 25 MG tablet TAKE 1 TABLET DAILY 90 tablet 1    Cholecalciferol (VITAMIN D3) 2000 units CAPS Take 1 capsule by mouth daily If not covered , will buy over the counter. 30 capsule 5    pramoxine-zinc oxide in mineral oil (TUCKS HEMORRHOIDAL) 1-12.5 % OINT ointment Place rectally as needed (hemorrhoids flare) 3 Tube 1    multivitamin (THERAGRAN) per tablet Take 1 tablet by mouth daily. No current facility-administered medications for this visit. Allergies: Allergies   Allergen Reactions    Latex Itching    Crestor [Rosuvastatin Calcium] Other (See Comments)     Muscle pain    Pcn [Penicillins] Anaphylaxis    Eggs [Egg White]     Flagyl [Metronidazole] Diarrhea     Blood in stool    Lipitor      Muscle pain.  Motrin [Ibuprofen Micronized] Other (See Comments)     Head tightness    Nsaids     Sulfa Antibiotics      hypertension    Aspirin Nausea And Vomiting    Imidazole Antifungals Nausea And Vomiting and Other (See Comments)    Quinolones Nausea And Vomiting       Physical Exam:  Vitals:    03/18/19 1236   BP: (!) 143/77   Pulse:      General: Della Hernandez is a healthy and well appearing 69 y.o. female who is sitting comfortably in our office in acute distress.  Alert and oriented. General Exam:   Constitutional: Patient is adequately groomed with no evidence of malnutrition  DTRs: Deep tendon reflexes are intact  Mental Status: The patient is oriented to time, place and person. The patient's mood and affect are appropriate. Lymphatic: The lymphatic examination bilaterally reveals all areas to be without enlargement or induration. Vascular: Examination reveals no swelling or calf tenderness. Peripheral pulses are palpable and 2+. Neurological: The patient has good coordination. There is no weakness or sensory deficit. left Shoulder Exam:  Inspection:  No gross deformities, no signs of infection. Palpation:  She does have glenohumeral and subacromial crepitus present. He has tenderness over the rotator cuff footprint. Active Range of Motion: Forward elevation of 120°, abduction of 70° external rotation of 0° and internal rotation to the back is to sacrum. Passive Range of Motion: Forward elevation can be further increased to 140° and abduction to 100° and external rotation of 5°. Strength:  4/5 supraspinatus 4/5 external rotation with resistance    Special Tests:  Negative external lag. Positive Ayde's, negative drop arm. No Jim muscle deformity. Neurovascular: Sensation to light touch is intact, no motor deficits, palpable radial pulses 2+    Comparison right Shoulder Examination:    Inspection:  No gross deformities. No signs of infection. Palpation:  Mild glenohumeral crepitus present. Active Range of Motion: Forward elevation 150°, abduction 150°, external rotation of 45° and internal rotation to the back is at T8. Passive Range of Motion:  Similar to active    Strength:  5/5 rotator cuff strength. Special Tests:  Negative Ayde's, negative impingement, negative apprehension signs. No Jim muscle deformity. Neurovascular: Sensation to light touch is fully intact, palpable radial pulses 2+. Neuro: alert.  oriented  Eyes: Extra-ocular muscles intact  Mouth: Oral mucosa moist. No perioral lesions  Pulm: Respirations unlabored and regular. Skin: warm, well perfused    Laboratory:  No visits with results within 14 Day(s) from this visit. Latest known visit with results is:   Orders Only on 08/11/2018   Component Date Value    Cholesterol, Total 08/11/2018 262*    Triglycerides 08/11/2018 60     HDL 08/11/2018 76*    LDL Calculated 08/11/2018 174*    VLDL Cholesterol Calcula* 08/11/2018 12       No results found for this or any previous visit (from the past 24 hour(s)). Radiographic:  3 xray views of the left  shoulder including True AP in internal and external and axillary lateral were taken in our office today reveal no fractures, dislocations, visible tumors, or signs of acute trauma. Various degenerative changes in the glenohumeral joint. She also has offered migration of the humeral head signifying rotator cuff dysfunction. Assessment:  Harry Oswald is a 69y.o. year old female with left shoulder pain related to rotator cuff arthropathy. Impression:  Encounter Diagnoses   Name Primary?  Left shoulder pain, unspecified chronicity Yes    Rotator cuff arthropathy, left        Office Procedures:  Orders Placed This Encounter   Procedures    XR SHOULDER LEFT (MIN 2 VIEWS)     Order Specific Question:   Reason for exam:     Answer:   Pain RM 2    IL ARTHROCENTESIS ASPIR&/INJ MAJOR JT/BURSA W/O US    IL METHYLPREDNISOLONE 40 MG INJ     Depo-Medrol 40mg with 1% Lidocaine       Plan:   The nature and natural history of osteoarthritis was discussed in detail the patient today. Treatment options both surgical and nonsurgical were discussed in detail. Patient was counseled with regard to the importance of activity modification. The role for medications, intra-articular injections as well as surgery were discussed. Patient's questions were answered.   He is aware that some point in her lifetime she will require a reverse total shoulder arthroplasty of her left shoulder. She would like to hold off on that for now. I find this reasonable. I believe the patient is a candidate for a corticosteroid injection today. She was agreeable to this. We will also get her started in a targeted physical therapy program.  Updated therapy order was placed in her chart and) I was handed to the patient today. After discussing the risks and benefits of a corticosteroid injection, Della did state an understanding and gave verbal consent to proceed. After cleansing the injection site with Chlora-prep and using aseptic techniques,  2 CC of Depo Medrol 40mg/ml and 8 CC of 1% lidocaine were injected in the left glenohumeral and subacromial space. She  tolerated the procedure well with no immediate adverse sequelae after the injection. A bandage was placed over the injection site. Appropriate post injections instructions were given to the patient. We will see how she responds to this treatment modality. We will see her back in 4-6 weeks for follow-up visit. 1:23 PM      Manoj Rubalcava PA-C  Orthopaedic Sports Medicine Physician Assistant      This dictation was performed with a verbal recognition program [facility]) and it was checked for errors. It is possible that there are still dictated errors within this office note. If so, please bring any errors to my attention for an addendum. All efforts were made to ensure that this office note is accurate.

## 2019-07-23 DIAGNOSIS — I10 ESSENTIAL HYPERTENSION: ICD-10-CM

## 2019-07-24 RX ORDER — DILTIAZEM HYDROCHLORIDE 240 MG/1
CAPSULE, EXTENDED RELEASE ORAL
Qty: 90 CAPSULE | Refills: 5 | Status: SHIPPED | OUTPATIENT
Start: 2019-07-24 | End: 2019-08-05 | Stop reason: SDUPTHER

## 2019-08-03 ENCOUNTER — TELEPHONE (OUTPATIENT)
Dept: PRIMARY CARE CLINIC | Age: 79
End: 2019-08-03

## 2019-08-03 DIAGNOSIS — I10 ESSENTIAL HYPERTENSION: ICD-10-CM

## 2019-08-05 RX ORDER — DILTIAZEM HYDROCHLORIDE 240 MG/1
CAPSULE, EXTENDED RELEASE ORAL
Qty: 90 CAPSULE | Refills: 5 | Status: SHIPPED | OUTPATIENT
Start: 2019-08-05 | End: 2019-09-26

## 2019-08-07 DIAGNOSIS — E78.2 MIXED HYPERLIPIDEMIA: ICD-10-CM

## 2019-08-08 DIAGNOSIS — E78.2 MIXED HYPERLIPIDEMIA: ICD-10-CM

## 2019-08-08 LAB
CHOLESTEROL, TOTAL: 262 MG/DL (ref 0–199)
HDLC SERPL-MCNC: 74 MG/DL (ref 40–60)
LDL CHOLESTEROL CALCULATED: 176 MG/DL
TRIGL SERPL-MCNC: 62 MG/DL (ref 0–150)
VLDLC SERPL CALC-MCNC: 12 MG/DL

## 2019-08-08 RX ORDER — FENOFIBRATE 160 MG/1
160 TABLET ORAL DAILY
Qty: 30 TABLET | Refills: 5 | Status: SHIPPED | OUTPATIENT
Start: 2019-08-08 | End: 2020-06-23 | Stop reason: SDUPTHER

## 2019-08-08 RX ORDER — EZETIMIBE 10 MG/1
10 TABLET ORAL DAILY
Qty: 30 TABLET | Refills: 3 | Status: SHIPPED | OUTPATIENT
Start: 2019-08-08 | End: 2020-06-23 | Stop reason: SDUPTHER

## 2019-08-09 ENCOUNTER — OFFICE VISIT (OUTPATIENT)
Dept: PRIMARY CARE CLINIC | Age: 79
End: 2019-08-09
Payer: COMMERCIAL

## 2019-08-09 VITALS
HEART RATE: 80 BPM | RESPIRATION RATE: 18 BRPM | WEIGHT: 193 LBS | TEMPERATURE: 97.6 F | SYSTOLIC BLOOD PRESSURE: 133 MMHG | BODY MASS INDEX: 34.19 KG/M2 | OXYGEN SATURATION: 98 % | DIASTOLIC BLOOD PRESSURE: 77 MMHG

## 2019-08-09 DIAGNOSIS — R73.03 PREDIABETES: ICD-10-CM

## 2019-08-09 DIAGNOSIS — I10 ESSENTIAL HYPERTENSION: ICD-10-CM

## 2019-08-09 DIAGNOSIS — B37.9 CANDIDIASIS: ICD-10-CM

## 2019-08-09 DIAGNOSIS — J45.40 MODERATE PERSISTENT ASTHMA WITHOUT COMPLICATION: ICD-10-CM

## 2019-08-09 DIAGNOSIS — E55.9 VITAMIN D DEFICIENCY: ICD-10-CM

## 2019-08-09 DIAGNOSIS — E78.2 MIXED HYPERLIPIDEMIA: Primary | ICD-10-CM

## 2019-08-09 DIAGNOSIS — I34.0 NON-RHEUMATIC MITRAL REGURGITATION: ICD-10-CM

## 2019-08-09 DIAGNOSIS — J01.00 ACUTE NON-RECURRENT MAXILLARY SINUSITIS: ICD-10-CM

## 2019-08-09 DIAGNOSIS — R22.1 NECK MASS: ICD-10-CM

## 2019-08-09 PROCEDURE — 99214 OFFICE O/P EST MOD 30 MIN: CPT | Performed by: INTERNAL MEDICINE

## 2019-08-09 RX ORDER — BUDESONIDE AND FORMOTEROL FUMARATE DIHYDRATE 160; 4.5 UG/1; UG/1
2 AEROSOL RESPIRATORY (INHALATION) 2 TIMES DAILY
Qty: 3 INHALER | Refills: 5 | Status: SHIPPED | OUTPATIENT
Start: 2019-08-09 | End: 2019-08-14 | Stop reason: SDUPTHER

## 2019-08-09 RX ORDER — BUDESONIDE AND FORMOTEROL FUMARATE DIHYDRATE 160; 4.5 UG/1; UG/1
2 AEROSOL RESPIRATORY (INHALATION) 2 TIMES DAILY
Qty: 1 INHALER | Refills: 5 | Status: SHIPPED | OUTPATIENT
Start: 2019-08-09 | End: 2019-08-09 | Stop reason: SDUPTHER

## 2019-08-09 RX ORDER — KETOCONAZOLE 20 MG/G
CREAM TOPICAL
Qty: 30 G | Refills: 1 | Status: SHIPPED | OUTPATIENT
Start: 2019-08-09 | End: 2019-08-09 | Stop reason: SDUPTHER

## 2019-08-09 RX ORDER — AZITHROMYCIN 250 MG/1
250 TABLET, FILM COATED ORAL SEE ADMIN INSTRUCTIONS
Qty: 6 TABLET | Refills: 0 | Status: SHIPPED | OUTPATIENT
Start: 2019-08-09 | End: 2019-08-14

## 2019-08-09 RX ORDER — KETOCONAZOLE 20 MG/G
CREAM TOPICAL
Qty: 60 G | Refills: 3 | Status: SHIPPED | OUTPATIENT
Start: 2019-08-09 | End: 2020-09-23

## 2019-08-09 ASSESSMENT — ENCOUNTER SYMPTOMS
SINUS COMPLAINT: 1
SHORTNESS OF BREATH: 0
ABDOMINAL PAIN: 0
BACK PAIN: 0
HOARSE VOICE: 0
RHINORRHEA: 0
COLOR CHANGE: 0
EYE PAIN: 0
SWOLLEN GLANDS: 0
SINUS PRESSURE: 0
CONSTIPATION: 0
DIARRHEA: 0
WHEEZING: 1
VISUAL CHANGE: 0
CHEST TIGHTNESS: 0
COUGH: 0
EYE ITCHING: 0
SORE THROAT: 0
NAUSEA: 1
TROUBLE SWALLOWING: 0
VOMITING: 0

## 2019-08-09 NOTE — PROGRESS NOTES
2019     Della Hernandez (:  1940) is a 67 y.o. female, here for evaluation of the following medical concerns:    Hyperlipidemia   This is a chronic problem. The current episode started more than 1 year ago. The problem is uncontrolled. Recent lipid tests were reviewed and are high (recent ldl 176 and cv risk calc of 27% over ten years. ). Exacerbating diseases include diabetes and obesity. Pertinent negatives include no chest pain, focal weakness, leg pain, myalgias or shortness of breath. (Only short of breath with asthma flares.) Treatments tried: intolerant of all statins including Livalo. .  never picked up zetia and tricor, but agrees to take now. The current treatment provides no improvement of lipids. Compliance problems include medication side effects. Sinus Problem   This is a chronic problem. The current episode started more than 1 month ago. The problem has been waxing and waning since onset. There has been no fever. Her pain is at a severity of 2/10. The pain is mild. Associated symptoms include congestion, ear pain and headaches. Pertinent negatives include no chills, coughing, diaphoresis, hoarse voice, neck pain, shortness of breath, sinus pressure, sneezing, sore throat or swollen glands. (Yellow mucous with blowing nose.) Past treatments include nothing. The treatment provided no relief. Other   This is a new (bowel incontinence. Patient feels it coming out , but can not control it.) problem. The problem occurs constantly. The problem has been unchanged. Associated symptoms include congestion, fatigue, headaches and nausea. Pertinent negatives include no abdominal pain, anorexia, arthralgias, chest pain, chills, coughing, diaphoresis, fever, myalgias, neck pain, numbness, rash, sore throat, swollen glands, urinary symptoms, vertigo, visual change, vomiting or weakness. The symptoms are aggravated by eating. She has tried nothing for the symptoms.  The treatment provided no

## 2019-08-10 LAB
ALBUMIN SERPL-MCNC: 4.4 G/DL (ref 3.4–5)
ANION GAP SERPL CALCULATED.3IONS-SCNC: 14 MMOL/L (ref 3–16)
BUN BLDV-MCNC: 14 MG/DL (ref 7–20)
CALCIUM SERPL-MCNC: 9.5 MG/DL (ref 8.3–10.6)
CHLORIDE BLD-SCNC: 101 MMOL/L (ref 99–110)
CO2: 25 MMOL/L (ref 21–32)
CREAT SERPL-MCNC: 1.1 MG/DL (ref 0.6–1.2)
GFR AFRICAN AMERICAN: 58
GFR NON-AFRICAN AMERICAN: 48
GLUCOSE BLD-MCNC: 96 MG/DL (ref 70–99)
PHOSPHORUS: 3.1 MG/DL (ref 2.5–4.9)
POTASSIUM SERPL-SCNC: 4.5 MMOL/L (ref 3.5–5.1)
SODIUM BLD-SCNC: 140 MMOL/L (ref 136–145)
VITAMIN D 25-HYDROXY: 28.3 NG/ML

## 2019-08-11 LAB — FRUCTOSAMINE: 266 UMOL/L (ref 170–285)

## 2019-08-12 ENCOUNTER — TELEPHONE (OUTPATIENT)
Dept: ADMINISTRATIVE | Age: 79
End: 2019-08-12

## 2019-08-12 NOTE — TELEPHONE ENCOUNTER
Pt requesting 1 month supply of budesonide-formoterol (SYMBICORT) 160-4.5 MCG/ACT AERO due to 3 months supply too expensive.  Pt also states per pharmacy medication needs to be preauthorized.    :  [address], [address] [facility] [phone] - F [phone]

## 2019-08-13 ENCOUNTER — HOSPITAL ENCOUNTER (OUTPATIENT)
Dept: NON INVASIVE DIAGNOSTICS | Age: 79
Discharge: HOME | End: 2019-08-13
Payer: MEDICARE

## 2019-08-13 DIAGNOSIS — J45.40 MODERATE PERSISTENT ASTHMA WITHOUT COMPLICATION: ICD-10-CM

## 2019-08-13 DIAGNOSIS — I34.0 NON-RHEUMATIC MITRAL REGURGITATION: ICD-10-CM

## 2019-08-13 LAB
LV EF: 63 %
LVEF MODALITY: NORMAL

## 2019-08-13 PROCEDURE — 93306 TTE W/DOPPLER COMPLETE: CPT

## 2019-08-14 RX ORDER — BUDESONIDE AND FORMOTEROL FUMARATE DIHYDRATE 160; 4.5 UG/1; UG/1
2 AEROSOL RESPIRATORY (INHALATION) 2 TIMES DAILY
Qty: 3 INHALER | Refills: 5 | Status: SHIPPED | OUTPATIENT
Start: 2019-08-14 | End: 2020-12-01

## 2019-08-19 ENCOUNTER — OFFICE VISIT (OUTPATIENT)
Dept: ENT CLINIC | Age: 79
End: 2019-08-19
Payer: MEDICARE

## 2019-08-19 VITALS
HEART RATE: 81 BPM | HEIGHT: 62 IN | TEMPERATURE: 99.3 F | WEIGHT: 193.6 LBS | SYSTOLIC BLOOD PRESSURE: 119 MMHG | DIASTOLIC BLOOD PRESSURE: 71 MMHG | BODY MASS INDEX: 35.63 KG/M2

## 2019-08-19 DIAGNOSIS — M79.18 MYOFASCIAL PAIN SYNDROME, CERVICAL: Primary | ICD-10-CM

## 2019-08-19 PROCEDURE — 1123F ACP DISCUSS/DSCN MKR DOCD: CPT | Performed by: OTOLARYNGOLOGY

## 2019-08-19 PROCEDURE — 1090F PRES/ABSN URINE INCON ASSESS: CPT | Performed by: OTOLARYNGOLOGY

## 2019-08-19 PROCEDURE — G8400 PT W/DXA NO RESULTS DOC: HCPCS | Performed by: OTOLARYNGOLOGY

## 2019-08-19 PROCEDURE — 4040F PNEUMOC VAC/ADMIN/RCVD: CPT | Performed by: OTOLARYNGOLOGY

## 2019-08-19 PROCEDURE — G8427 DOCREV CUR MEDS BY ELIG CLIN: HCPCS | Performed by: OTOLARYNGOLOGY

## 2019-08-19 PROCEDURE — 99203 OFFICE O/P NEW LOW 30 MIN: CPT | Performed by: OTOLARYNGOLOGY

## 2019-08-19 PROCEDURE — G8417 CALC BMI ABV UP PARAM F/U: HCPCS | Performed by: OTOLARYNGOLOGY

## 2019-08-19 PROCEDURE — 1036F TOBACCO NON-USER: CPT | Performed by: OTOLARYNGOLOGY

## 2019-08-19 NOTE — PROGRESS NOTES
Osteopenia     Rash     Renal calculus 5/6/2013    Restless legs syndrome     Sciatica 7/16/2014    Sinusitis     Tinnitus     TMJ dysfunction     Urinary incontinence                                                     Past Surgical History:   Procedure Laterality Date    APPENDECTOMY      CHOLECYSTECTOMY      COLONOSCOPY  2011     Dr Jai Garrison  07/10/2018    at [facility] done by Dr. Alise Ramirez, [address] HISTORY: Family history reviewed. Except as noted in history of present illness, there is no pertinent family history      REVIEW OF SYSTEMS:  All pertinent positive and negative review of systems included in HPI. Otherwise, all systems are reviewed and negative. PHYSICAL EXAMINATION:   GENERAL: wdwn- no acute distress  RESPIRATORY:  No stridor or respiratory distress  COMMUNICATION :  Normal voice  MENTAL STATUS:  Mood and affect normal, oriented X 3  HEAD AND FACE:  No abnormalities of the sin of face or head  EXTERNAL EARS AND NOSE:  Normal pinnae bilateral  FACIAL MUSCLES:  All branches of facial nerve intact  EXTRAOCULAR MUSCLES: Intact with full range of motion  FACE PALPATION:  No tenderness over sinuses. Zygomatic arches and orbital rims intact  OTOSCOPY:  Normal external auditory canals, tympanic membranes, and middle ear spaces  TUNING FORKS: Rinne ++ Cast midline at 512 Hz  INTRANASAL:  Septum midline, turbinates normal, meati clear. LIPS, TEETH, GINGIVA:  Normal mucosa  PHARYNX:  Normal  NECK:  No masses. The patient feels is her sternocleidomastoid muscle, clavicular head, which is a bit tender. LYMPHATIC:  No cervical adenopathy  SALIVARY GLANDS:  No swelling or masses in the parotid or submandibular salivary glands  THYROID:  No goiter or thyroid masses. IMPRESSION: Myofascial cervical pain. PLAN: Patient reassured. FOLLOW-UP: As needed.

## 2019-09-04 RX ORDER — SPIRONOLACTONE 25 MG/1
TABLET ORAL
Qty: 90 TABLET | Refills: 4 | Status: SHIPPED | OUTPATIENT
Start: 2019-09-04 | End: 2020-06-23 | Stop reason: SDUPTHER

## 2019-09-25 NOTE — PROGRESS NOTES
Subjective:      Patient ID: Nichole Li is a 67 y.o. female    Chief Complaint   Patient presents with    Hypertension     CC:  Chest pain mostly at rest.  High cholesterol. HPI:  Is here as a new patient. Patient is a former Edgardl[facility] Henle patient and is here to follow up after for chest pain. Echo was WNL in 2018. Left sided CP and left arm pain. Allergies   Allergen Reactions    Latex Itching    Crestor [Rosuvastatin Calcium] Other (See Comments)     Muscle pain    Pcn [Penicillins] Anaphylaxis    Eggs [Egg White]     Flagyl [Metronidazole] Diarrhea     Blood in stool    Lipitor      Muscle pain.  Motrin [Ibuprofen Micronized] Other (See Comments)     Head tightness    Nsaids     Sulfa Antibiotics      hypertension    Aspirin Nausea And Vomiting    Imidazole Antifungals Nausea And Vomiting and Other (See Comments)    Quinolones Nausea And Vomiting       Social History     Socioeconomic History    Marital status:       Spouse name: None    Number of children: None    Years of education: None    Highest education level: None   Occupational History    None   Social Needs    Financial resource strain: None    Food insecurity:     Worry: None     Inability: None    Transportation needs:     Medical: None     Non-medical: None   Tobacco Use    Smoking status: Former Smoker     Last attempt to quit: 1972     Years since quittin.7    Smokeless tobacco: Never Used   Substance and Sexual Activity    Alcohol use: No    Drug use: No    Sexual activity: Never     Partners: Male   Lifestyle    Physical activity:     Days per week: None     Minutes per session: None    Stress: None   Relationships    Social connections:     Talks on phone: None     Gets together: None     Attends Mu-ism service: None     Active member of club or organization: None     Attends meetings of clubs or organizations: None     Relationship status: None    Intimate partner violence: Fear of current or ex partner: None     Emotionally abused: None     Physically abused: None     Forced sexual activity: None   Other Topics Concern    None   Social History Narrative    None       Family History   Problem Relation Age of Onset    Arthritis Mother     Cancer Mother     Diabetes Mother     Heart Disease Mother     High Blood Pressure Mother     Kidney Disease Mother     High Cholesterol Mother     Arthritis Father     Cancer Father     Diabetes Father     Heart Disease Father     High Blood Pressure Father     Kidney Disease Father     High Cholesterol Father     Heart Disease Brother 58        coronary artery disease with stent    Heart Disease Brother 72        cad        has a past medical history of Allergic rhinitis, Asthma, Chronic back pain, COPD (chronic obstructive pulmonary disease) ([address] Dizziness, Fibromyalgia, GERD (gastroesophageal reflux disease), Hyperlipidemia, Hypertension, Irritable bowel syndrome, Obesity, Osteoarthritis, Osteopenia, Rash, Renal calculus, Restless legs syndrome, Sciatica, Sinusitis, Tinnitus, TMJ dysfunction, and Urinary incontinence. Review of Systems   HENT: Negative. Eyes: Negative. Respiratory: Positive for chest tightness. Negative for apnea, cough, choking, shortness of breath, wheezing and stridor. Cardiovascular: Positive for chest pain. Gastrointestinal: Negative. Negative for abdominal pain. Endocrine: Negative. Genitourinary: Negative. Skin: Negative. Allergic/Immunologic: Negative. Neurological: Negative. Hematological: Negative. Psychiatric/Behavioral: Negative. Vitals:    09/26/19 1352   BP: 114/70   Pulse: 65        8/13/19 ECHO   Summary   Left ventricular cavity size is normal. There is mild left ventricular   hypertrophy. Overall left ventricular systolic function appears normal with   an ejection fraction of 60-65%. No regional wall motion abnormalities are   noted.  Normal diastolic mouth daily. No current facility-administered medications for this visit. Assessment:       Diagnosis Orders   1. Essential hypertension  EKG 12 lead   2. Chest pain, unspecified type               Plan:      Chest pain:  Stop diltiazem night before gxt myoview. HTN; controlled. HLp:  Not controlled. Rubi Burrell RN, am scribing for and in the presence of Dr. Claudette Fritz. 09/26/19   2:41 PM  Lucy Raza RN    I, Dr. Claudette Fritz, personal performed the services described in this documentation as scribed by Lucy Raza RN in my presence, and it is both accurate and complete.

## 2019-09-26 ENCOUNTER — OFFICE VISIT (OUTPATIENT)
Dept: CARDIOLOGY CLINIC | Age: 79
End: 2019-09-26
Payer: MEDICARE

## 2019-09-26 VITALS
HEART RATE: 65 BPM | BODY MASS INDEX: 35.48 KG/M2 | WEIGHT: 194 LBS | SYSTOLIC BLOOD PRESSURE: 114 MMHG | DIASTOLIC BLOOD PRESSURE: 70 MMHG

## 2019-09-26 DIAGNOSIS — I10 ESSENTIAL HYPERTENSION: Primary | ICD-10-CM

## 2019-09-26 DIAGNOSIS — R07.9 CHEST PAIN, UNSPECIFIED TYPE: Primary | ICD-10-CM

## 2019-09-26 DIAGNOSIS — R07.9 CHEST PAIN, UNSPECIFIED TYPE: ICD-10-CM

## 2019-09-26 PROCEDURE — G8427 DOCREV CUR MEDS BY ELIG CLIN: HCPCS | Performed by: INTERNAL MEDICINE

## 2019-09-26 PROCEDURE — 93000 ELECTROCARDIOGRAM COMPLETE: CPT | Performed by: INTERNAL MEDICINE

## 2019-09-26 PROCEDURE — G8400 PT W/DXA NO RESULTS DOC: HCPCS | Performed by: INTERNAL MEDICINE

## 2019-09-26 PROCEDURE — 99213 OFFICE O/P EST LOW 20 MIN: CPT | Performed by: INTERNAL MEDICINE

## 2019-09-26 PROCEDURE — 1123F ACP DISCUSS/DSCN MKR DOCD: CPT | Performed by: INTERNAL MEDICINE

## 2019-09-26 PROCEDURE — 1036F TOBACCO NON-USER: CPT | Performed by: INTERNAL MEDICINE

## 2019-09-26 PROCEDURE — G8417 CALC BMI ABV UP PARAM F/U: HCPCS | Performed by: INTERNAL MEDICINE

## 2019-09-26 PROCEDURE — 1090F PRES/ABSN URINE INCON ASSESS: CPT | Performed by: INTERNAL MEDICINE

## 2019-09-26 PROCEDURE — 4040F PNEUMOC VAC/ADMIN/RCVD: CPT | Performed by: INTERNAL MEDICINE

## 2019-09-26 ASSESSMENT — ENCOUNTER SYMPTOMS
STRIDOR: 0
GASTROINTESTINAL NEGATIVE: 1
ALLERGIC/IMMUNOLOGIC NEGATIVE: 1
ABDOMINAL PAIN: 0
APNEA: 0
EYES NEGATIVE: 1
CHOKING: 0
SHORTNESS OF BREATH: 0
CHEST TIGHTNESS: 1
WHEEZING: 0
COUGH: 0

## 2019-10-18 ENCOUNTER — HOSPITAL ENCOUNTER (OUTPATIENT)
Dept: NON INVASIVE DIAGNOSTICS | Age: 79
Discharge: HOME | End: 2019-10-18
Payer: MEDICARE

## 2019-10-18 DIAGNOSIS — R07.9 CHEST PAIN, UNSPECIFIED TYPE: ICD-10-CM

## 2019-10-18 LAB
LV EF: 70 %
LVEF MODALITY: NORMAL

## 2019-10-18 PROCEDURE — 3430000000 HC RX DIAGNOSTIC RADIOPHARMACEUTICAL: Performed by: INTERNAL MEDICINE

## 2019-10-18 PROCEDURE — 93017 CV STRESS TEST TRACING ONLY: CPT

## 2019-10-18 PROCEDURE — A9502 TC99M TETROFOSMIN: HCPCS | Performed by: INTERNAL MEDICINE

## 2019-10-18 PROCEDURE — 2580000003 HC RX 258: Performed by: INTERNAL MEDICINE

## 2019-10-18 PROCEDURE — 78452 HT MUSCLE IMAGE SPECT MULT: CPT

## 2019-10-18 RX ORDER — 0.9 % SODIUM CHLORIDE 0.9 %
10 VIAL (ML) INJECTION
Status: COMPLETED | OUTPATIENT
Start: 2019-10-18 | End: 2019-10-18

## 2019-10-18 RX ADMIN — TETROFOSMIN 10.59 MILLICURIE: 1.38 INJECTION, POWDER, LYOPHILIZED, FOR SOLUTION INTRAVENOUS at 08:42

## 2019-10-18 RX ADMIN — TETROFOSMIN 34.6 MILLICURIE: 1.38 INJECTION, POWDER, LYOPHILIZED, FOR SOLUTION INTRAVENOUS at 09:58

## 2019-10-18 RX ADMIN — SODIUM CHLORIDE 10 ML: 9 INJECTION, SOLUTION INTRAMUSCULAR; INTRAVENOUS; SUBCUTANEOUS at 09:58

## 2019-10-18 RX ADMIN — SODIUM CHLORIDE 10 ML: 9 INJECTION, SOLUTION INTRAMUSCULAR; INTRAVENOUS; SUBCUTANEOUS at 08:42

## 2019-10-21 ENCOUNTER — OFFICE VISIT (OUTPATIENT)
Dept: CARDIOLOGY CLINIC | Age: 79
End: 2019-10-21
Payer: MEDICARE

## 2019-10-21 VITALS
DIASTOLIC BLOOD PRESSURE: 70 MMHG | SYSTOLIC BLOOD PRESSURE: 100 MMHG | HEART RATE: 60 BPM | WEIGHT: 194.87 LBS | BODY MASS INDEX: 35.64 KG/M2

## 2019-10-21 DIAGNOSIS — I10 ESSENTIAL HYPERTENSION: ICD-10-CM

## 2019-10-21 DIAGNOSIS — E78.2 MIXED HYPERLIPIDEMIA: Primary | ICD-10-CM

## 2019-10-21 PROCEDURE — G8417 CALC BMI ABV UP PARAM F/U: HCPCS | Performed by: INTERNAL MEDICINE

## 2019-10-21 PROCEDURE — 1036F TOBACCO NON-USER: CPT | Performed by: INTERNAL MEDICINE

## 2019-10-21 PROCEDURE — G8400 PT W/DXA NO RESULTS DOC: HCPCS | Performed by: INTERNAL MEDICINE

## 2019-10-21 PROCEDURE — G8427 DOCREV CUR MEDS BY ELIG CLIN: HCPCS | Performed by: INTERNAL MEDICINE

## 2019-10-21 PROCEDURE — 1090F PRES/ABSN URINE INCON ASSESS: CPT | Performed by: INTERNAL MEDICINE

## 2019-10-21 PROCEDURE — 1123F ACP DISCUSS/DSCN MKR DOCD: CPT | Performed by: INTERNAL MEDICINE

## 2019-10-21 PROCEDURE — 99213 OFFICE O/P EST LOW 20 MIN: CPT | Performed by: INTERNAL MEDICINE

## 2019-10-21 PROCEDURE — G8484 FLU IMMUNIZE NO ADMIN: HCPCS | Performed by: INTERNAL MEDICINE

## 2019-10-21 PROCEDURE — 4040F PNEUMOC VAC/ADMIN/RCVD: CPT | Performed by: INTERNAL MEDICINE

## 2019-10-21 ASSESSMENT — ENCOUNTER SYMPTOMS
COUGH: 0
GASTROINTESTINAL NEGATIVE: 1
ABDOMINAL PAIN: 0
WHEEZING: 0
CHOKING: 0
EYES NEGATIVE: 1
APNEA: 0
CHEST TIGHTNESS: 1
SHORTNESS OF BREATH: 0
ALLERGIC/IMMUNOLOGIC NEGATIVE: 1
STRIDOR: 0

## 2020-01-10 ENCOUNTER — TELEPHONE (OUTPATIENT)
Dept: PRIMARY CARE CLINIC | Age: 80
End: 2020-01-10

## 2020-01-10 NOTE — TELEPHONE ENCOUNTER
Spoke with patient advised patient to go to Urgent care or to call overflow department to be treated. Patient stated that she would like something called into pharmacy.  Please advise

## 2020-01-10 NOTE — TELEPHONE ENCOUNTER
Explained need to see patient and instructed to come in today, but she will wait to see how she feels.

## 2020-01-14 ENCOUNTER — TELEPHONE (OUTPATIENT)
Dept: PRIMARY CARE CLINIC | Age: 80
End: 2020-01-14

## 2020-02-04 ENCOUNTER — TELEPHONE (OUTPATIENT)
Dept: PRIMARY CARE CLINIC | Age: 80
End: 2020-02-04

## 2020-02-04 NOTE — TELEPHONE ENCOUNTER
Pt states med pramoxine-hydrocortisone (PRAMOSONE) 1-2.5 % cream need to be sent [facility] not [facility]. Please advise thank you    [address], [address] [phone] - F [phone]

## 2020-02-18 ENCOUNTER — TELEPHONE (OUTPATIENT)
Dept: PRIMARY CARE CLINIC | Age: 80
End: 2020-02-18

## 2020-02-18 NOTE — TELEPHONE ENCOUNTER
Patient states her insurance will not pay for pramoxine-hydrocortisone (PRAMOSONE) 1-2.5 % cream.  She states she has always used Hydrocortisone cream 2.5%. Patient would like a prescription for Hydrocortisone 2.5 % cream sent to her pharmacy.   Please advise

## 2020-02-19 ENCOUNTER — TELEPHONE (OUTPATIENT)
Dept: PRIMARY CARE CLINIC | Age: 80
End: 2020-02-19

## 2020-03-24 ENCOUNTER — TELEPHONE (OUTPATIENT)
Dept: CARDIOLOGY CLINIC | Age: 80
End: 2020-03-24

## 2020-04-08 ENCOUNTER — TELEPHONE (OUTPATIENT)
Dept: PRIMARY CARE CLINIC | Age: 80
End: 2020-04-08

## 2020-04-08 ENCOUNTER — VIRTUAL VISIT (OUTPATIENT)
Dept: PRIMARY CARE CLINIC | Age: 80
End: 2020-04-08
Payer: MEDICARE

## 2020-04-08 ENCOUNTER — NURSE TRIAGE (OUTPATIENT)
Dept: OTHER | Facility: CLINIC | Age: 80
End: 2020-04-08

## 2020-04-08 PROCEDURE — 99213 OFFICE O/P EST LOW 20 MIN: CPT | Performed by: INTERNAL MEDICINE

## 2020-04-08 ASSESSMENT — ENCOUNTER SYMPTOMS
DIARRHEA: 1
VOMITING: 0
WHEEZING: 0
CHEST TIGHTNESS: 0
SINUS PRESSURE: 0
SHORTNESS OF BREATH: 0
COLOR CHANGE: 0
EYE PAIN: 0
BACK PAIN: 0
RHINORRHEA: 0
SORE THROAT: 0
CONSTIPATION: 0
TROUBLE SWALLOWING: 0
EYE ITCHING: 0
COUGH: 0
NAUSEA: 0
ABDOMINAL PAIN: 1

## 2020-04-08 NOTE — TELEPHONE ENCOUNTER
no  10. ANTIBIOTIC USE: \"Are you taking antibiotics now or have you taken antibiotics in the past 2 months? \"        no  11. OTHER SYMPTOMS: \"Do you have any other symptoms? \" (e.g., fever, blood in stool)        no  12. PREGNANCY: \"Is there any chance you are pregnant? \" \"When was your last menstrual period? \"       n/a    Protocols used: [id number]-[id number]-AP

## 2020-05-13 RX ORDER — HYDROCORTISONE 25 MG/G
CREAM TOPICAL
Qty: 90 G | Refills: 3 | Status: SHIPPED | OUTPATIENT
Start: 2020-05-13 | End: 2020-09-23

## 2020-06-22 ENCOUNTER — TELEPHONE (OUTPATIENT)
Dept: PRIMARY CARE CLINIC | Age: 80
End: 2020-06-22

## 2020-06-22 DIAGNOSIS — E78.2 MIXED HYPERLIPIDEMIA: ICD-10-CM

## 2020-06-22 DIAGNOSIS — E55.9 VITAMIN D DEFICIENCY: ICD-10-CM

## 2020-06-22 DIAGNOSIS — R73.03 PREDIABETES: ICD-10-CM

## 2020-06-22 DIAGNOSIS — R22.1 NECK MASS: ICD-10-CM

## 2020-06-22 DIAGNOSIS — I10 ESSENTIAL HYPERTENSION: ICD-10-CM

## 2020-06-22 LAB
A/G RATIO: 1.4 (ref 1.1–2.2)
ALBUMIN SERPL-MCNC: 4.3 G/DL (ref 3.4–5)
ALP BLD-CCNC: 77 U/L (ref 40–129)
ALT SERPL-CCNC: 13 U/L (ref 10–40)
ANION GAP SERPL CALCULATED.3IONS-SCNC: 17 MMOL/L (ref 3–16)
AST SERPL-CCNC: 19 U/L (ref 15–37)
BASOPHILS ABSOLUTE: 0.1 K/UL (ref 0–0.2)
BASOPHILS RELATIVE PERCENT: 1.1 %
BILIRUB SERPL-MCNC: 1 MG/DL (ref 0–1)
BUN BLDV-MCNC: 12 MG/DL (ref 7–20)
CALCIUM SERPL-MCNC: 9.6 MG/DL (ref 8.3–10.6)
CHLORIDE BLD-SCNC: 98 MMOL/L (ref 99–110)
CHOLESTEROL, TOTAL: 267 MG/DL (ref 0–199)
CO2: 24 MMOL/L (ref 21–32)
CREAT SERPL-MCNC: 1 MG/DL (ref 0.6–1.2)
EOSINOPHILS ABSOLUTE: 0.2 K/UL (ref 0–0.6)
EOSINOPHILS RELATIVE PERCENT: 2.6 %
ESTIMATED AVERAGE GLUCOSE: 119.8 MG/DL
GFR AFRICAN AMERICAN: >60
GFR NON-AFRICAN AMERICAN: 53
GLOBULIN: 3.1 G/DL
GLUCOSE BLD-MCNC: 98 MG/DL (ref 70–99)
HBA1C MFR BLD: 5.8 %
HCT VFR BLD CALC: 40.6 % (ref 36–48)
HDLC SERPL-MCNC: 72 MG/DL (ref 40–60)
HEMOGLOBIN: 13.2 G/DL (ref 12–16)
LDL CHOLESTEROL CALCULATED: 183 MG/DL
LYMPHOCYTES ABSOLUTE: 2.2 K/UL (ref 1–5.1)
LYMPHOCYTES RELATIVE PERCENT: 35.8 %
MCH RBC QN AUTO: 27.5 PG (ref 26–34)
MCHC RBC AUTO-ENTMCNC: 32.4 G/DL (ref 31–36)
MCV RBC AUTO: 84.7 FL (ref 80–100)
MONOCYTES ABSOLUTE: 0.4 K/UL (ref 0–1.3)
MONOCYTES RELATIVE PERCENT: 6 %
NEUTROPHILS ABSOLUTE: 3.3 K/UL (ref 1.7–7.7)
NEUTROPHILS RELATIVE PERCENT: 54.5 %
PDW BLD-RTO: 14.3 % (ref 12.4–15.4)
PHOSPHORUS: 4.2 MG/DL (ref 2.5–4.9)
PLATELET # BLD: 285 K/UL (ref 135–450)
PMV BLD AUTO: 7.8 FL (ref 5–10.5)
POTASSIUM SERPL-SCNC: 4.5 MMOL/L (ref 3.5–5.1)
RBC # BLD: 4.79 M/UL (ref 4–5.2)
SODIUM BLD-SCNC: 139 MMOL/L (ref 136–145)
TOTAL PROTEIN: 7.4 G/DL (ref 6.4–8.2)
TRIGL SERPL-MCNC: 58 MG/DL (ref 0–150)
TSH REFLEX: 2.18 UIU/ML (ref 0.27–4.2)
VITAMIN D 25-HYDROXY: 29.5 NG/ML
VLDLC SERPL CALC-MCNC: 12 MG/DL
WBC # BLD: 6.1 K/UL (ref 4–11)

## 2020-06-22 NOTE — TELEPHONE ENCOUNTER
Pt said she had lab work done today and said she is still having pain in her left side, stomach burning and she cannot eat. Please advise.      PATIENT:  [phone]

## 2020-06-22 NOTE — TELEPHONE ENCOUNTER
I returned a call and left a message message that normal white blood cell count and no anemia and normal kidney function and due to abdominal pain to make an in office appointment for tomorrow morning.     Please give patient an appointment for tomorrow to see me in the office

## 2020-06-23 ENCOUNTER — OFFICE VISIT (OUTPATIENT)
Dept: PRIMARY CARE CLINIC | Age: 80
End: 2020-06-23
Payer: MEDICARE

## 2020-06-23 ENCOUNTER — TELEPHONE (OUTPATIENT)
Dept: PRIMARY CARE CLINIC | Age: 80
End: 2020-06-23

## 2020-06-23 VITALS
OXYGEN SATURATION: 98 % | WEIGHT: 185 LBS | TEMPERATURE: 98.1 F | HEART RATE: 63 BPM | DIASTOLIC BLOOD PRESSURE: 78 MMHG | HEIGHT: 62 IN | BODY MASS INDEX: 34.04 KG/M2 | SYSTOLIC BLOOD PRESSURE: 132 MMHG

## 2020-06-23 PROCEDURE — 99214 OFFICE O/P EST MOD 30 MIN: CPT | Performed by: INTERNAL MEDICINE

## 2020-06-23 RX ORDER — ACETAMINOPHEN 160 MG
1 TABLET,DISINTEGRATING ORAL DAILY
Qty: 30 CAPSULE | Refills: 5 | Status: SHIPPED | OUTPATIENT
Start: 2020-06-23 | End: 2020-09-23 | Stop reason: SDUPTHER

## 2020-06-23 RX ORDER — FAMOTIDINE 20 MG/1
20 TABLET, FILM COATED ORAL 2 TIMES DAILY
Qty: 60 TABLET | Refills: 3 | Status: SHIPPED | OUTPATIENT
Start: 2020-06-23 | End: 2020-09-23 | Stop reason: SDUPTHER

## 2020-06-23 RX ORDER — SPIRONOLACTONE 25 MG/1
TABLET ORAL
Qty: 90 TABLET | Refills: 4 | Status: SHIPPED | OUTPATIENT
Start: 2020-06-23 | End: 2020-09-23 | Stop reason: SDUPTHER

## 2020-06-23 RX ORDER — DILTIAZEM HYDROCHLORIDE 180 MG/1
180 CAPSULE, COATED, EXTENDED RELEASE ORAL DAILY
Qty: 30 CAPSULE | Refills: 5 | Status: SHIPPED | OUTPATIENT
Start: 2020-06-23 | End: 2020-09-23

## 2020-06-23 RX ORDER — FENOFIBRATE 160 MG/1
160 TABLET ORAL DAILY
Qty: 30 TABLET | Refills: 5 | Status: SHIPPED | OUTPATIENT
Start: 2020-06-23 | End: 2020-09-23 | Stop reason: SDUPTHER

## 2020-06-23 RX ORDER — EZETIMIBE 10 MG/1
10 TABLET ORAL DAILY
Qty: 30 TABLET | Refills: 3 | Status: SHIPPED | OUTPATIENT
Start: 2020-06-23 | End: 2020-09-23 | Stop reason: SDUPTHER

## 2020-06-23 ASSESSMENT — ENCOUNTER SYMPTOMS
HEMATOCHEZIA: 0
TROUBLE SWALLOWING: 0
COUGH: 0
SINUS COMPLAINT: 1
RHINORRHEA: 0
ABDOMINAL PAIN: 1
SHORTNESS OF BREATH: 0
WHEEZING: 0
CONSTIPATION: 0
EYE PAIN: 0
BACK PAIN: 0
CHEST TIGHTNESS: 0
SORE THROAT: 0
NAUSEA: 1
DIARRHEA: 0
SINUS PRESSURE: 1
VOMITING: 0
EYE ITCHING: 0
COLOR CHANGE: 0

## 2020-06-23 ASSESSMENT — PATIENT HEALTH QUESTIONNAIRE - PHQ9
1. LITTLE INTEREST OR PLEASURE IN DOING THINGS: 0
SUM OF ALL RESPONSES TO PHQ9 QUESTIONS 1 & 2: 0
SUM OF ALL RESPONSES TO PHQ QUESTIONS 1-9: 0
SUM OF ALL RESPONSES TO PHQ QUESTIONS 1-9: 0
2. FEELING DOWN, DEPRESSED OR HOPELESS: 0

## 2020-06-23 NOTE — PROGRESS NOTES
2020     Della Hernandez (:  1940) is a 67 y.o. female, here for evaluation of the following medical concerns: In person visit  Abdominal Pain   This is a new (started in april in the [facility] and persisted.  ) problem. The onset quality is gradual. The problem occurs intermittently. The problem has been gradually worsening. The pain is located in the LLQ and RUQ. The pain is at a severity of 5/10. The pain is moderate. The quality of the pain is aching and cramping. The abdominal pain does not radiate. Associated symptoms include anorexia and nausea. Pertinent negatives include no arthralgias, constipation, diarrhea, dysuria, fever, frequency, headaches, hematochezia, hematuria, melena, myalgias or vomiting. Associated symptoms comments: Early satiety. No constipation on miralax. History of nephrolithiasis. One episode of black stools while taking pepto-bismal that cleared up after stopped taking. . Nothing aggravates the pain. The pain is relieved by nothing. She has tried nothing for the symptoms. The treatment provided no relief. Prior diagnostic workup includes lower endoscopy (colonoscopy was in  and polyps removed and EGD in 2017 and GERD with hiatal hernia.). Her past medical history is significant for abdominal surgery, gallstones and GERD. cholescyctectomy , remote. Hypertension   This is a chronic problem. The current episode started more than 1 year ago. The problem has been waxing and waning since onset. The problem is controlled. Pertinent negatives include no anxiety, blurred vision, chest pain, headaches, malaise/fatigue, neck pain, palpitations, peripheral edema or shortness of breath. There are no associated agents to hypertension. Risk factors for coronary artery disease include dyslipidemia, obesity and sedentary lifestyle.  Past treatments include calcium channel blockers (When patient last saw her cardiologist blood pressure was low and she was told to discontinue the diltiazem to 40 mg daily. Patient stop [place] blood pressure first going after one 2958 diastolic so she restarted it. I explained that we should probably red). The current treatment provides significant improvement. There is no history of kidney disease, CAD/MI, CVA, heart failure or PVD.     6/22/2020  2:44 PM - [facility] Incoming Lab Results From Soft (Epic Adt)     Component Value Ref Range & Units Status Collected Lab   Hemoglobin A1C 5.8  See comment % Final 06/22/2020  9:43 AM [facility]   Comment:   Diagnosis of Diabetes: > or = 6.5%   Increased risk of diabetes (Prediabetes): 5.7-6.4%   Glycemic Control: Nonpregnant Adults: <7.0%                     Pregnant: <6.0%      eAG 119.8  mg/dL Final       Prediabetes managed with diet. No polydipsia or polyuria. It has been stable for years. 6/22/2020  1:17 PM - [facility] Incoming Lab Results From Soft (Epic Adt)     Component Value Ref Range & Units Status Collected Lab   Sodium 139  136 - 145 mmol/L Final 06/22/2020  9:43 AM [facility]   Potassium 4.5  3.5 - 5.1 mmol/L Final 06/22/2020  9:43 AM [facility]   Chloride 98Low   99 - 110 mmol/L Final 06/22/2020  9:43 AM [facility]   CO2 24  21 - 32 mmol/L Final 06/22/2020  9:43 AM [facility]   Anion Gap 17High   3 - 16 Final 06/22/2020  9:43 AM [facility]   Glucose 98  70 - 99 mg/dL Final 06/22/2020  9:43 AM [facility]   BUN 12  7 - 20 mg/dL Final 06/22/2020  9:43 AM [facility]   CREATININE 1.0  0.6 - 1.2 mg/dL Final 06/22/2020  9:43 AM [facility]   GFR Non- 53Abnormal   >60 Final 06/22/2020  9:43 AM [facility]   >60 mL/min/1.73m2 EGFR, calc. for ages 25 and older using the   MDRD formula (not corrected for weight), is valid for stable   renal function.     GFR  >60  >60 Final 06/22/2020  9:43 AM  - [facility]   Chronic Kidney Disease: less than 60 ml/min/1.73

## 2020-06-24 LAB — FRUCTOSAMINE: 264 UMOL/L (ref 170–285)

## 2020-06-25 ENCOUNTER — TELEPHONE (OUTPATIENT)
Dept: PRIMARY CARE CLINIC | Age: 80
End: 2020-06-25

## 2020-06-26 PROBLEM — Z91.018 MULTIPLE FOOD ALLERGIES: Status: ACTIVE | Noted: 2020-06-26

## 2020-06-26 PROBLEM — K21.9 GASTROESOPHAGEAL REFLUX DISEASE WITHOUT ESOPHAGITIS: Status: ACTIVE | Noted: 2020-06-26

## 2020-06-26 PROBLEM — R11.0 CHRONIC NAUSEA: Status: ACTIVE | Noted: 2020-06-26

## 2020-06-26 PROBLEM — R10.32 LLQ PAIN: Status: ACTIVE | Noted: 2020-06-26

## 2020-06-26 ASSESSMENT — ENCOUNTER SYMPTOMS: BLURRED VISION: 0

## 2020-06-30 ENCOUNTER — HOSPITAL ENCOUNTER (OUTPATIENT)
Dept: CT IMAGING | Age: 80
Discharge: HOME | End: 2020-06-30
Payer: MEDICARE

## 2020-06-30 PROCEDURE — 74177 CT ABD & PELVIS W/CONTRAST: CPT

## 2020-06-30 PROCEDURE — 6360000004 HC RX CONTRAST MEDICATION: Performed by: INTERNAL MEDICINE

## 2020-06-30 RX ADMIN — IOHEXOL 50 ML: 240 INJECTION, SOLUTION INTRATHECAL; INTRAVASCULAR; INTRAVENOUS; ORAL at 09:35

## 2020-06-30 RX ADMIN — IOPAMIDOL 80 ML: 755 INJECTION, SOLUTION INTRAVENOUS at 09:35

## 2020-07-13 ENCOUNTER — TELEPHONE (OUTPATIENT)
Dept: ORTHOPEDIC SURGERY | Age: 80
End: 2020-07-13

## 2020-07-31 ENCOUNTER — OFFICE VISIT (OUTPATIENT)
Dept: CARDIOLOGY CLINIC | Age: 80
End: 2020-07-31
Payer: MEDICARE

## 2020-07-31 VITALS
DIASTOLIC BLOOD PRESSURE: 64 MMHG | HEART RATE: 79 BPM | TEMPERATURE: 97.1 F | SYSTOLIC BLOOD PRESSURE: 120 MMHG | WEIGHT: 183 LBS | BODY MASS INDEX: 33.47 KG/M2

## 2020-07-31 PROCEDURE — 99213 OFFICE O/P EST LOW 20 MIN: CPT | Performed by: INTERNAL MEDICINE

## 2020-07-31 ASSESSMENT — ENCOUNTER SYMPTOMS
ABDOMINAL PAIN: 0
CHEST TIGHTNESS: 1
GASTROINTESTINAL NEGATIVE: 1
EYES NEGATIVE: 1
CHOKING: 0
STRIDOR: 0
WHEEZING: 0
SHORTNESS OF BREATH: 0
COUGH: 0
APNEA: 0
ALLERGIC/IMMUNOLOGIC NEGATIVE: 1

## 2020-07-31 NOTE — PROGRESS NOTES
Subjective:      Patient ID: Haily Roy is a 79 y.o. female    No chief complaint on file. CC:  Chest pain mostly at rest.  High cholesterol. HPI:  Is here as a new patient. Patient is a former [facility] patient and is here to follow up after for chest pain. Echo was WNL in 2018. Left sided CP and left arm pain. Allergies   Allergen Reactions    Latex Itching    Crestor [Rosuvastatin Calcium] Other (See Comments)     Muscle pain    Pcn [Penicillins] Anaphylaxis    Eggs [Egg White]     Flagyl [Metronidazole] Diarrhea     Blood in stool    Lipitor      Muscle pain.  Motrin [Ibuprofen Micronized] Other (See Comments)     Head tightness    Nsaids     Sulfa Antibiotics      hypertension    Aspirin Nausea And Vomiting    Imidazole Antifungals Nausea And Vomiting and Other (See Comments)    Quinolones Nausea And Vomiting       Social History     Socioeconomic History    Marital status:       Spouse name: None    Number of children: None    Years of education: None    Highest education level: None   Occupational History    None   Social Needs    Financial resource strain: None    Food insecurity     Worry: None     Inability: None    Transportation needs     Medical: None     Non-medical: None   Tobacco Use    Smoking status: Former Smoker     Last attempt to quit: 1972     Years since quittin.5    Smokeless tobacco: Never Used   Substance and Sexual Activity    Alcohol use: No    Drug use: No    Sexual activity: Never     Partners: Male   Lifestyle    Physical activity     Days per week: None     Minutes per session: None    Stress: None   Relationships    Social connections     Talks on phone: None     Gets together: None     Attends [employer] service: None     Active member of club or organization: None     Attends meetings of clubs or organizations: None     Relationship status: None    Intimate partner violence     Fear of current or ex partner: None Emotionally abused: None     Physically abused: None     Forced sexual activity: None   Other Topics Concern    None   Social History Narrative    None       Family History   Problem Relation Age of Onset    Arthritis Mother     Cancer Mother     Diabetes Mother     Heart Disease Mother     High Blood Pressure Mother     Kidney Disease Mother     High Cholesterol Mother     Arthritis Father     Cancer Father     Diabetes Father     Heart Disease Father     High Blood Pressure Father     Kidney Disease Father     High Cholesterol Father     Heart Disease Brother 58        coronary artery disease with stent    Heart Disease Brother 72        cad        has a past medical history of Allergic rhinitis, Asthma, Chronic back pain, COPD (chronic obstructive pulmonary disease) ([address] Dizziness, Fibromyalgia, GERD (gastroesophageal reflux disease), Hyperlipidemia, Hypertension, Irritable bowel syndrome, Obesity, Osteoarthritis, Osteopenia, Rash, Renal calculus, Restless legs syndrome, Sciatica, Sinusitis, Tinnitus, TMJ dysfunction, and Urinary incontinence. Review of Systems   HENT: Negative. Eyes: Negative. Respiratory: Positive for chest tightness. Negative for apnea, cough, choking, shortness of breath, wheezing and stridor. Cardiovascular: Positive for chest pain. Gastrointestinal: Negative. Negative for abdominal pain. Endocrine: Negative. Genitourinary: Negative. Skin: Negative. Allergic/Immunologic: Negative. Neurological: Negative. Hematological: Negative. Psychiatric/Behavioral: Negative. Vitals:    07/31/20 1559   BP: 120/64   Pulse: 79   Temp: 97.1 °F (36.2 °C)        8/13/19 ECHO   Summary   Left ventricular cavity size is normal. There is mild left ventricular   hypertrophy. Overall left ventricular systolic function appears normal with   an ejection fraction of 60-65%. No regional wall motion abnormalities are   noted. Normal diastolic function. The mitral valve leaflets are slightly   thickened with normal leaflet mobility. Trivial mitral and tricuspid   regurgitation. Estimated pulmonary artery systolic pressure is at 25 mmHg   assuming a right atrial pressure of 3 mmHg. Mild pulmonic regurgitation   present. Vitals:    07/31/20 1559   BP: 120/64   Pulse: 79   Temp: 97.1 °F (36.2 °C)       Objective:   Physical Exam  Constitutional:       Appearance: She is well-developed. HENT:      Head: Normocephalic. Eyes:      General: No scleral icterus. Neck:      Musculoskeletal: Normal range of motion. Cardiovascular:      Rate and Rhythm: Normal rate and regular rhythm. Heart sounds: Normal heart sounds. No murmur. Pulmonary:      Effort: Pulmonary effort is normal.      Breath sounds: Normal breath sounds. Abdominal:      General: Bowel sounds are normal.      Palpations: Abdomen is soft. Musculoskeletal: Normal range of motion. General: No tenderness or deformity. Skin:     General: Skin is warm and dry. Capillary Refill: Capillary refill takes less than 2 seconds. Findings: No erythema. Neurological:      Mental Status: She is oriented to person, place, and time. Cranial Nerves: No cranial nerve deficit. Psychiatric:         Behavior: Behavior normal.         Thought Content: Thought content normal.         Judgment: Judgment normal.         Current Outpatient Medications   Medication Sig Dispense Refill    dilTIAZem (CARDIZEM CD) 180 MG extended release capsule Take 1 capsule by mouth daily Stop 240 mg 30 capsule 5    famotidine (PEPCID) 20 MG tablet Take 1 tablet by mouth 2 times daily 60 tablet 3    ezetimibe (ZETIA) 10 MG tablet Take 1 tablet by mouth daily 30 tablet 3    Cholecalciferol (VITAMIN D3) 50 MCG (2000 UT) CAPS Take 1 capsule by mouth daily If not covered , will buy over the counter.  30 capsule 5    hydrocortisone (ANUSOL-HC) 2.5 % CREA rectal cream USE A FINGERTIP AMOUNT RECTALLY TWICE

## 2020-09-21 ENCOUNTER — TELEPHONE (OUTPATIENT)
Dept: PRIMARY CARE CLINIC | Age: 80
End: 2020-09-21

## 2020-09-21 NOTE — TELEPHONE ENCOUNTER
----- Message from Jose Heck sent at 9/21/2020  8:19 AM EDT -----  Subject: Message to Provider    QUESTIONS  Information for Provider? Patient Enrique Hung wants to have lab   work done before her appointment. Upon calling the office we learned blood   work is closed until January 1st. She needs assistance in setting up her   labs.   ---------------------------------------------------------------------------  --------------  [address]  What is the best way for the office to contact you? OK to leave message on   voicemail  Preferred Call Back Phone Number? [phone]  ---------------------------------------------------------------------------  --------------  SCRIPT ANSWERS  Relationship to Patient?  Self

## 2020-09-21 NOTE — TELEPHONE ENCOUNTER
Let patient know that the can get labs at [facility] lab. I put an order for a fasting cholesterol and a1c and urine microalbumin to be done before next appointment.

## 2020-09-23 ENCOUNTER — OFFICE VISIT (OUTPATIENT)
Dept: PRIMARY CARE CLINIC | Age: 80
End: 2020-09-23
Payer: MEDICARE

## 2020-09-23 VITALS
OXYGEN SATURATION: 97 % | TEMPERATURE: 98.1 F | BODY MASS INDEX: 32.57 KG/M2 | DIASTOLIC BLOOD PRESSURE: 69 MMHG | SYSTOLIC BLOOD PRESSURE: 133 MMHG | WEIGHT: 177 LBS | HEART RATE: 68 BPM | HEIGHT: 62 IN

## 2020-09-23 PROBLEM — Z86.0100 PERSONAL HISTORY OF COLONIC POLYPS: Status: ACTIVE | Noted: 2020-09-23

## 2020-09-23 PROBLEM — Z86.010 PERSONAL HISTORY OF COLONIC POLYPS: Status: ACTIVE | Noted: 2020-09-23

## 2020-09-23 PROCEDURE — G0438 PPPS, INITIAL VISIT: HCPCS | Performed by: INTERNAL MEDICINE

## 2020-09-23 RX ORDER — ACETAMINOPHEN 160 MG
1 TABLET,DISINTEGRATING ORAL DAILY
Qty: 30 CAPSULE | Refills: 5 | COMMUNITY
Start: 2020-09-23 | End: 2021-01-01 | Stop reason: SDUPTHER

## 2020-09-23 RX ORDER — SPIRONOLACTONE 25 MG/1
TABLET ORAL
Qty: 90 TABLET | Refills: 4 | Status: SHIPPED | OUTPATIENT
Start: 2020-09-23 | End: 2021-01-01 | Stop reason: SDUPTHER

## 2020-09-23 RX ORDER — FAMOTIDINE 20 MG/1
20 TABLET, FILM COATED ORAL 2 TIMES DAILY
Qty: 180 TABLET | Refills: 3 | Status: SHIPPED | OUTPATIENT
Start: 2020-09-23 | End: 2021-01-01 | Stop reason: SDUPTHER

## 2020-09-23 RX ORDER — DOXYCYCLINE HYCLATE 100 MG
100 TABLET ORAL 2 TIMES DAILY
Qty: 20 TABLET | Refills: 0 | Status: SHIPPED | OUTPATIENT
Start: 2020-09-23 | End: 2020-10-03

## 2020-09-23 RX ORDER — DILTIAZEM HYDROCHLORIDE 240 MG/1
240 CAPSULE, COATED, EXTENDED RELEASE ORAL DAILY
Qty: 90 CAPSULE | Refills: 3 | Status: SHIPPED | OUTPATIENT
Start: 2020-09-23 | End: 2021-01-01 | Stop reason: SDUPTHER

## 2020-09-23 RX ORDER — FENOFIBRATE 160 MG/1
160 TABLET ORAL DAILY
Qty: 90 TABLET | Refills: 5 | Status: SHIPPED | OUTPATIENT
Start: 2020-09-23 | End: 2021-01-01

## 2020-09-23 RX ORDER — EZETIMIBE 10 MG/1
10 TABLET ORAL DAILY
Qty: 90 TABLET | Refills: 3 | Status: SHIPPED | OUTPATIENT
Start: 2020-09-23 | End: 2020-10-03

## 2020-09-23 ASSESSMENT — PATIENT HEALTH QUESTIONNAIRE - PHQ9
SUM OF ALL RESPONSES TO PHQ QUESTIONS 1-9: 0
SUM OF ALL RESPONSES TO PHQ QUESTIONS 1-9: 0
1. LITTLE INTEREST OR PLEASURE IN DOING THINGS: 0
SUM OF ALL RESPONSES TO PHQ QUESTIONS 1-9: 0
SUM OF ALL RESPONSES TO PHQ QUESTIONS 1-9: 0
SUM OF ALL RESPONSES TO PHQ9 QUESTIONS 1 & 2: 0
SUM OF ALL RESPONSES TO PHQ9 QUESTIONS 1 & 2: 0
2. FEELING DOWN, DEPRESSED OR HOPELESS: 0
1. LITTLE INTEREST OR PLEASURE IN DOING THINGS: 0
2. FEELING DOWN, DEPRESSED OR HOPELESS: 0

## 2020-09-23 ASSESSMENT — LIFESTYLE VARIABLES
HOW OFTEN DO YOU HAVE A DRINK CONTAINING ALCOHOL: 0
HOW OFTEN DO YOU HAVE A DRINK CONTAINING ALCOHOL: 0

## 2020-09-23 NOTE — PATIENT INSTRUCTIONS
Personalized Preventive Plan for Isaias Hernandez - 9/23/2020  Medicare offers a range of preventive health benefits. Some of the tests and screenings are paid in full while other may be subject to a deductible, co-insurance, and/or copay. Some of these benefits include a comprehensive review of your medical history including lifestyle, illnesses that may run in your family, and various assessments and screenings as appropriate. After reviewing your medical record and screening and assessments performed today your provider may have ordered immunizations, labs, imaging, and/or referrals for you. A list of these orders (if applicable) as well as your Preventive Care list are included within your After Visit Summary for your review. Other Preventive Recommendations:    · A preventive eye exam performed by an eye specialist is recommended every 1-2 years to screen for glaucoma; cataracts, macular degeneration, and other eye disorders. · A preventive dental visit is recommended every 6 months. · Try to get at least 150 minutes of exercise per week or 10,000 steps per day on a pedometer . · Order or download the FREE \"Exercise & Physical Activity: Your Everyday Guide\" from [employer]. Call 2-822.567.5708 or search [employer] online. · You need 7384-6811 mg of calcium and 7966-3646 IU of vitamin D per day. It is possible to meet your calcium requirement with diet alone, but a vitamin D supplement is usually necessary to meet this goal.  · When exposed to the sun, use a sunscreen that protects against both UVA and UVB radiation with an SPF of 30 or greater. Reapply every 2 to 3 hours or after sweating, drying off with a towel, or swimming. · Always wear a seat belt when traveling in a car. Always wear a helmet when riding a bicycle or motorcycle. Patient Education        Learning About High Cholesterol  What is high cholesterol?      High cholesterol means that you have too much cholesterol in your blood. Cholesterol is a type of fat. It's needed for many body functions, such as making new cells. Cholesterol is made by your body. It also comes from food you eat. Having high cholesterol can lead to the buildup of plaque in artery walls. This can increase your risk of heart disease and stroke. When your doctor talks about high cholesterol levels, he or she is talking about your total cholesterol and LDL cholesterol (the \"bad\" cholesterol) levels. Your doctor may also speak about HDL (the \"good\" cholesterol) levels. High HDL is linked with a lower risk for heart disease, heart attack, and stroke. Your cholesterol levels help your doctor find out your risk for having a heart attack or stroke. How can you prevent high cholesterol? A heart-healthy lifestyle can help you prevent high cholesterol. This lifestyle helps lower your risk for a heart attack and stroke. Eat heart-healthy foods. Eat fruits, vegetables, whole grains (like oatmeal), dried beans and peas, nuts and seeds, soy products (like tofu), and fat-free or low-fat dairy products. Replace butter, margarine, and hydrogenated or partially hydrogenated oils with olive and canola oils. (Canola oil margarine without trans fat is fine.)  Replace red meat with fish, poultry, and soy protein (like tofu). Limit processed and packaged foods like chips, crackers, and cookies. Be active. Exercise can improve your cholesterol level. Get at least 30 minutes of exercise on most days of the week. Walking is a good choice. You also may want to do other activities, such as running, swimming, cycling, or playing tennis or team sports. Stay at a healthy weight. Lose weight if you need to. Don't smoke. If you need help quitting, talk to your doctor about stop-smoking programs and medicines. These can increase your chances of quitting for good. How is high cholesterol treated?   The goal of treatment is to reduce your chances of having a heart attack or stroke. The goal is not to lower your cholesterol numbers only. You may make lifestyle changes, such as eating healthy foods, not smoking, losing weight, and being more active. You may have to take medicine. Follow-up care is a key part of your treatment and safety. Be sure to make and go to all appointments, and call your doctor if you are having problems. It's also a good idea to know your test results and keep a list of the medicines you take. Where can you learn more? Go to [email or web address] and sign in to your MiArch account. Enter C294 in the EarlyDoc box to learn more about \"Learning About High Cholesterol. \"     If you do not have an account, please click on the \"Sign Up Now\" link. Current as of: December 16, 2019               Content Version: 12.5  © 4306-2816 finalsite. Care instructions adapted under license by [facility] ([facility]). If you have questions about a medical condition or this instruction, always ask your healthcare professional. [address] any warranty or liability for your use of this information. Patient Education        Learning About High Blood Pressure  What is high blood pressure? Blood pressure is a measure of how hard the blood pushes against the walls of your arteries. It's normal for blood pressure to go up and down throughout the day. But if it stays up, you have high blood pressure. Another name for high blood pressure is hypertension. Two numbers tell you your blood pressure. The first number is the systolic pressure (top number). It shows how hard the blood pushes when your heart is pumping. The second number is the diastolic pressure (bottom number). It shows how hard the blood pushes between heartbeats, when your heart is relaxed and filling with blood. Your doctor will give you a goal for your blood pressure based on your health and your age.  High blood pressure (hypertension) means that the top number stays high, or the bottom number stays high, or both. High blood pressure increases the risk of stroke, heart attack, and other problems. What happens when you have high blood pressure? Blood flows through your arteries with too much force. Over time, this damages the walls of your arteries. But you can't feel it. High blood pressure usually doesn't cause symptoms. Fat and calcium start to build up in your arteries. This buildup is called plaque. Plaque makes your arteries narrower and stiffer. Blood can't flow through them as easily. This lack of good blood flow starts to damage some of the organs in your body. This can lead to problems such as coronary artery disease and heart attack, heart failure, stroke, kidney failure, and eye damage. How can you prevent high blood pressure? Stay at a healthy weight. Try to limit how much sodium you eat to less than 2,300 milligrams (mg) a day. If you limit your sodium to 1,500 mg a day, you can lower your blood pressure even more. Buy foods that are labeled \"unsalted,\" \"sodium-free,\" or \"low-sodium. \" Foods labeled \"reduced-sodium\" and \"light sodium\" may still have too much sodium. Flavor your food with garlic, lemon juice, onion, vinegar, herbs, and spices instead of salt. Do not use soy sauce, steak sauce, onion salt, garlic salt, mustard, or ketchup on your food. Use less salt (or none) when recipes call for it. You can often use half the salt a recipe calls for without losing flavor. Be physically active. Get at least 30 minutes of exercise on most days of the week. Walking is a good choice. You also may want to do other activities, such as running, swimming, cycling, or playing tennis or team sports. Limit alcohol to 2 drinks a day for men and 1 drink a day for women. Eat plenty of fruits, vegetables, and low-fat dairy products. Eat less saturated and total fats. How is high blood pressure treated?   Your doctor will suggest making lifestyle changes to help your heart. For example, your doctor may ask you to eat healthy foods, quit smoking, lose extra weight, and be more active. If lifestyle changes don't help enough, your doctor may recommend that you take medicine. When blood pressure is very high, medicines are needed to lower it. Follow-up care is a key part of your treatment and safety. Be sure to make and go to all appointments, and call your doctor if you are having problems. It's also a good idea to know your test results and keep a list of the medicines you take. Where can you learn more? Go to [email or web address] and sign in to your [employer] account. Enter P501 in the Forseva box to learn more about \"Learning About High Blood Pressure. \"     If you do not have an account, please click on the \"Sign Up Now\" link. Current as of: December 16, 2019               Content Version: 12.5  © 0735-4531 [employer]. Care instructions adapted under license by [facility] ([facility]). If you have questions about a medical condition or this instruction, always ask your healthcare professional. [place] [address] any warranty or liability for your use of this information. Patient Education        Low Sodium Diet (2,000 Milligram): Care Instructions  Your Care Instructions     Too much sodium causes your body to hold on to extra water. This can raise your blood pressure and force your heart and kidneys to work harder. In very serious cases, this could cause you to be put in the hospital. It might even be life-threatening. By limiting sodium, you will feel better and lower your risk of serious problems. The most common source of sodium is salt. People get most of the salt in their diet from canned, prepared, and packaged foods. Fast food and restaurant meals also are very high in sodium.  Your doctor will probably limit your sodium to less than 2,000 milligrams (mg) a day. This limit counts all the sodium in prepared and packaged foods and any salt you add to your food. Follow-up care is a key part of your treatment and safety. Be sure to make and go to all appointments, and call your doctor if you are having problems. It's also a good idea to know your test results and keep a list of the medicines you take. How can you care for yourself at home? Read food labels  Read labels on cans and food packages. The labels tell you how much sodium is in each serving. Make sure that you look at the serving size. If you eat more than the serving size, you have eaten more sodium. Food labels also tell you the Percent Daily Value for sodium. Choose products with low Percent Daily Values for sodium. Be aware that sodium can come in forms other than salt, including monosodium glutamate (MSG), sodium citrate, and sodium bicarbonate (baking soda). MSG is often added to Asian food. When you eat out, you can sometimes ask for food without MSG or added salt. Buy low-sodium foods  Buy foods that are labeled \"unsalted\" (no salt added), \"sodium-free\" (less than 5 mg of sodium per serving), or \"low-sodium\" (less than 140 mg of sodium per serving). Foods labeled \"reduced-sodium\" and \"light sodium\" may still have too much sodium. Be sure to read the label to see how much sodium you are getting. Buy fresh vegetables, or frozen vegetables without added sauces. Buy low-sodium versions of canned vegetables, soups, and other canned goods. Prepare low-sodium meals  Cut back on the amount of salt you use in cooking. This will help you adjust to the taste. Do not add salt after cooking. One teaspoon of salt has about 2,300 mg of sodium. Take the salt shaker off the table. Flavor your food with garlic, lemon juice, onion, vinegar, herbs, and spices. Do not use soy sauce, lite soy sauce, steak sauce, onion salt, garlic salt, celery salt, mustard, or ketchup on your food.   Use low-sodium salad dressings, sauces, and ketchup. Or make your own salad dressings and sauces without adding salt. Use less salt (or none) when recipes call for it. You can often use half the salt a recipe calls for without losing flavor. Other foods such as rice, pasta, and grains do not need added salt. Rinse canned vegetables, and cook them in fresh water. This removes some--but not all--of the salt. Avoid water that is naturally high in sodium or that has been treated with water softeners, which add sodium. Call your local water company to find out the sodium content of your water supply. If you buy bottled water, read the label and choose a sodium-free brand. Avoid high-sodium foods  Avoid eating:  Smoked, cured, salted, and canned meat, fish, and poultry. Ham, rhoades, hot dogs, and luncheon meats. Regular, hard, and processed cheese and regular peanut butter. Crackers with salted tops, and other salted snack foods such as pretzels, chips, and salted popcorn. Frozen prepared meals, unless labeled low-sodium. Canned and dried soups, broths, and bouillon, unless labeled sodium-free or low-sodium. Canned vegetables, unless labeled sodium-free or low-sodium. [place] fries, pizza, tacos, and other fast foods. Pickles, olives, ketchup, and other condiments, especially soy sauce, unless labeled sodium-free or low-sodium. Where can you learn more? Go to [email or web address] and sign in to your Accord account. Enter Y232 in the [facility] box to learn more about \"Low Sodium Diet (2,000 Milligram): Care Instructions. \"     If you do not have an account, please click on the \"Sign Up Now\" link. Current as of: August 22, 2019               Content Version: 12.5  © 9025-5875 [employer]. Care instructions adapted under license by [facility] ([facility]).  If you have questions about a medical condition or this instruction, always ask your healthcare professional. Alfred Knox disclaims any warranty or liability for your use of this information. Patient Education         Asthma: The Importance of Controller Medicines (00:25)  Your health professional recommends that you watch this short online health video. Learn why your controller medicines are so important. How to watch the video    Scan the QR code   OR Visit the website    [email or web address]   Current as of: February 24, 2020               Content Version: 12.5  © 2006-2020 Healthwise, Incorporated. Care instructions adapted under license by [facility] ([facility]). If you have questions about a medical condition or this instruction, always ask your healthcare professional. [address] any warranty or liability for your use of this information. Patient Education         Taking Statins: How Others Decided (02:16)  Your health professional recommends that you watch this short online health video. Hear why some people choose medicine and why others try to change their habits first.     How to watch the video    Scan the QR code   OR Visit the website    [email or web address]   Current as of: December 16, 2019               Content Version: 12.5  © 2006-2020 Healthwise, Incorporated. Care instructions adapted under license by [facility] ([facility]). If you have questions about a medical condition or this instruction, always ask your healthcare professional. [address] any warranty or liability for your use of this information. Patient Education         Advance Directives (02:51)  Your health professional recommends that you watch this short online health video. Learn how advance directives let others know your care preferences when you can't speak for yourself. How to watch the video    Scan the QR code   OR Visit the website    [email or web address]   Current as of: December 9, 2019               Content Version: 12.5  © 2006-2020 Healthwise, Incorporated. Care instructions adapted under license by [facility] ([facility]). If you have questions about a medical condition or this instruction, always ask your healthcare professional. [address] any warranty or liability for your use of this information.

## 2020-09-23 NOTE — PROGRESS NOTES
Medicare Annual Wellness Visit  Name: Vicki Patel Date: 2020   MRN: <[id number]> Sex: Female   Age: 79 y.o. Ethnicity: Non-/Non    : 1940 Race: Black      Della Hernandez is here for Medicare AWV    Screenings for behavioral, psychosocial and functional/safety risks, and cognitive dysfunction are all negative except as indicated below. These results, as well as other patient data from the [address] form, are documented in Flowsheets linked to this Encounter. Allergies   Allergen Reactions    Latex Itching    Crestor [Rosuvastatin Calcium] Other (See Comments)     Muscle pain    Pcn [Penicillins] Anaphylaxis    Eggs [Egg White]     Flagyl [Metronidazole] Diarrhea     Blood in stool    Lipitor      Muscle pain.  Motrin [Ibuprofen Micronized] Other (See Comments)     Head tightness    Nsaids     Sulfa Antibiotics      hypertension    Aspirin Nausea And Vomiting    Imidazole Antifungals Nausea And Vomiting and Other (See Comments)    Quinolones Nausea And Vomiting       Prior to Visit Medications    Medication Sig Taking? Authorizing Provider   dilTIAZem (CARDIZEM CD) 180 MG extended release capsule Take 1 capsule by mouth daily Stop 240 mg Yes Lynette Samaniego MD   famotidine (PEPCID) 20 MG tablet Take 1 tablet by mouth 2 times daily Yes Lynette Samaniego MD   fenofibrate (TRIGLIDE) 160 MG tablet Take 1 tablet by mouth daily Yes Lynette Samaniego MD   ezetimibe (ZETIA) 10 MG tablet Take 1 tablet by mouth daily Yes Lynette Samaniego MD   Cholecalciferol (VITAMIN D3) 50 MCG ( UT) CAPS Take 1 capsule by mouth daily If not covered , will buy over the counter.  Yes Lynette Samaniego MD   spironolactone (ALDACTONE) 25 MG tablet TAKE 1 TABLET DAILY Yes Lynette Samaniego MD   hydrocortisone (ANUSOL-HC) 2.5 % CREA rectal cream USE A FINGERTIP AMOUNT RECTALLY TWICE DAILY Yes Lynette Samaniego MD   Probiotic Product (PROBIOTIC PO) Take by mouth Yes Historical Provider, MD   budesonide-formoterol (SYMBICORT) 160-4.5 MCG/ACT AERO Inhale 2 puffs into the lungs 2 times daily Yes Dewight Cockayne, MD   ketoconazole (NIZORAL) 2 % cream Apply topically two times daily under breast or in groin as needed for rash. Yes Dewight Cockayne, MD   multivitamin [facility]) per tablet Take 1 tablet by mouth daily.    Yes Historical Provider, MD       Past Medical History:   Diagnosis Date    Allergic rhinitis     Asthma     Chronic back pain     COPD (chronic obstructive pulmonary disease) ([facility])     Dizziness     Fibromyalgia     GERD (gastroesophageal reflux disease)     Hyperlipidemia     Hypertension     Irritable bowel syndrome     Obesity     Osteoarthritis     Osteopenia     Rash     Renal calculus 5/6/2013    Restless legs syndrome     Sciatica 7/16/2014    Sinusitis     Tinnitus     TMJ dysfunction     Urinary incontinence        Past Surgical History:   Procedure Laterality Date    APPENDECTOMY      CHOLECYSTECTOMY      COLONOSCOPY  2011     Dr Bossman Spears  07/10/2018    at [facility] by Dr. Ted Ojeda, [address] [facility]       Family History   Problem Relation Age of Onset    Arthritis Mother     Cancer Mother     Diabetes Mother     Heart Disease Mother     High Blood Pressure Mother     Kidney Disease Mother     High Cholesterol Mother     Arthritis Father     Cancer Father     Diabetes Father     Heart Disease Father     High Blood Pressure Father     Kidney Disease Father     High Cholesterol Father     Heart Disease Brother 58        coronary artery disease with stent    Heart Disease Brother 72        cad       CareTeam (Including outside providers/suppliers regularly involved in providing care):   Patient Care Team:  Dewight Cockayne, MD as PCP - Jordan Neumann MD as PCP - [facility]  Abdullahi Kurtz MD as Consulting Physician (Gastroenterology)  Chester Varma MD as Surgeon (General Surgery)    Wt Readings from Last 3 Encounters:   09/23/20 177 lb (80.3 kg)   07/31/20 183 lb (83 kg)   06/23/20 185 lb (83.9 kg)     Vitals:    09/23/20 1509 09/23/20 1518   BP: (!) 141/75 133/69   Pulse: 68    Temp: 98.1 °F (36.7 °C)    TempSrc: Oral    SpO2: 97%    Weight: 177 lb (80.3 kg)    Height: 5' 2\" (1.575 m)      Body mass index is 32.37 kg/m². Based upon direct observation of the patient, evaluation of cognition reveals recent and remote memory intact. Physical Exam  Vitals signs and nursing note reviewed. Constitutional:       General: She is not in acute distress. Appearance: She is well-developed. She is not diaphoretic. HENT:      Head: Normocephalic and atraumatic. Right Ear: External ear normal.      Left Ear: External ear normal.      Nose: Nose normal.      Mouth/Throat:      Pharynx: No oropharyngeal exudate. Eyes:      General:         Right eye: No discharge. Left eye: No discharge. Conjunctiva/sclera: Conjunctivae normal.      Pupils: Pupils are equal, round, and reactive to light. Neck:      Musculoskeletal: Normal range of motion and neck supple. Thyroid: No thyromegaly. Trachea: No tracheal deviation. Cardiovascular:      Rate and Rhythm: Normal rate and regular rhythm. Heart sounds: No murmur. No gallop. Pulmonary:      Effort: Pulmonary effort is normal. No respiratory distress. Breath sounds: No stridor. No wheezing, rhonchi or rales. Chest:      Chest wall: No tenderness. Abdominal:      General: Abdomen is flat. Bowel sounds are normal. There is no distension. Palpations: Abdomen is soft. There is no mass. Tenderness: There is no abdominal tenderness. There is no right CVA tenderness, left CVA tenderness, guarding or rebound. Hernia: No hernia is present. least 150 minutes/week    Hearing/Vision:  No exam data present  Hearing/Vision  Do you or your family notice any trouble with your hearing?: No  Do you have difficulty driving, watching TV, or doing any of your daily activities because of your eyesight?: No  Have you had an eye exam within the past year?: (!) No  Hearing/Vision Interventions:  · no hearing or vision problems. Personalized Preventive Plan   Current Health Maintenance Status  Immunization History   Administered Date(s) Administered    Meningococcal B, OMV (Bexsero) 05/03/2018    Pneumococcal Conjugate 13-valent ([id number]) 09/30/2015    Pneumococcal Polysaccharide ([id number]) 02/23/2012        Health Maintenance   Topic Date Due    DTaP/Tdap/Td vaccine (1 - Tdap) 07/11/1960    Shingles Vaccine (1 of 2) 07/11/1991    Annual Wellness Visit (AWV)  07/31/2020    Flu vaccine (1) 09/01/2020    Potassium monitoring  06/22/2021    Creatinine monitoring  06/22/2021    Colon cancer screen colonoscopy  08/14/2023    DEXA (modify frequency per FRAX score)  Completed    Pneumococcal 65+ years Vaccine  Completed    Hepatitis A vaccine  Aged Out    Hepatitis B vaccine  Aged Out    Hib vaccine  Aged Out    Meningococcal (ACWY) vaccine  Aged Out     Recommendations for [place] Due: see orders and patient instructions/AVS.  . Recommended screening schedule for the next 5-10 years is provided to the patient in written form: see Patient Penny Villafana was seen today for medicare awv. Diagnoses and all orders for this visit:    Routine general medical examination at a health care facility        boil in left axilla , recurrent present for over a month. Groin boil left for a month. Hypertension controlled. Hyperlipidemia:  Not taking fenofibrate, zetia, will restart. Intolerant of statin. Diagnosis Orders   1. Routine general medical examination at a health care facility     2.  Personal history of colonic polyps , up to date with colonoscopy. 3. Moderate persistent asthma without complication , stable , not requiring inhalers at present. 4. Essential hypertension controlled on current regimen. BP Readings from Last 3 Encounters:   09/23/20 133/69   07/31/20 120/64   06/23/20 132/78    spironolactone (ALDACTONE) 25 MG tablet    dilTIAZem (CARDIZEM CD) 240 MG extended release capsule   5. Gastroesophageal reflux disease without esophagitis stable on famotidine, will refill. famotidine (PEPCID) 20 MG tablet   6. Mixed hyperlipidemia statin intolerant. Will start fenofibrate and continue zetia. fenofibrate (TRIGLIDE) 160 MG tablet    ezetimibe (ZETIA) 10 MG tablet   7. Vitamin D deficiency  Cholecalciferol (VITAMIN D3) 50 MCG (2000 UT) CAPS   8. Boil , recurrent, refer for removal of root. Torie Jones MD, Surgical Oncology, [facility] (colorectal, general, breast)    doxycycline hyclate (VIBRA-TABS) 100 MG tablet   9. Menopause  DEXA BONE DENSITY 2 SITES   10. Postural kyphosis, unspecified spinal region  XR THORACIC SPINE (2 VIEWS)       Resuscitation/Code Status Note on Della Hernandez (YOB: 1940)    At office on September 23, 2020, resuscitation/code status decision was based on a thorough discussion with the patient. The code status was made a Full Code. .    Electronically signed by Elizabeth Flores MD on 9/23/20 at 3:51 PM EDT

## 2020-09-28 ENCOUNTER — HOSPITAL ENCOUNTER (OUTPATIENT)
Dept: GENERAL RADIOLOGY | Age: 80
Discharge: HOME | End: 2020-09-28
Payer: MEDICARE

## 2020-09-28 DIAGNOSIS — E78.2 MIXED HYPERLIPIDEMIA: ICD-10-CM

## 2020-09-28 DIAGNOSIS — R73.03 PREDIABETES: ICD-10-CM

## 2020-09-28 LAB
CHOLESTEROL, TOTAL: 233 MG/DL (ref 0–199)
CREATININE URINE: 103.6 MG/DL (ref 28–259)
HDLC SERPL-MCNC: 81 MG/DL (ref 40–60)
LDL CHOLESTEROL CALCULATED: 142 MG/DL
MICROALBUMIN UR-MCNC: <1.2 MG/DL
MICROALBUMIN/CREAT UR-RTO: NORMAL MG/G (ref 0–30)
TRIGL SERPL-MCNC: 50 MG/DL (ref 0–150)
VLDLC SERPL CALC-MCNC: 10 MG/DL

## 2020-09-28 PROCEDURE — 72070 X-RAY EXAM THORAC SPINE 2VWS: CPT

## 2020-09-29 LAB
ESTIMATED AVERAGE GLUCOSE: 125.5 MG/DL
HBA1C MFR BLD: 6 %

## 2020-09-30 ENCOUNTER — TELEPHONE (OUTPATIENT)
Dept: SURGERY | Age: 80
End: 2020-09-30

## 2020-10-03 RX ORDER — BEMPEDOIC ACID 180 MG/1
1 TABLET, FILM COATED ORAL DAILY
Qty: 30 TABLET | Refills: 5 | Status: SHIPPED | OUTPATIENT
Start: 2020-10-03 | End: 2021-01-01 | Stop reason: SDUPTHER

## 2020-10-06 ENCOUNTER — TELEPHONE (OUTPATIENT)
Dept: PRIMARY CARE CLINIC | Age: 80
End: 2020-10-06

## 2020-10-06 NOTE — TELEPHONE ENCOUNTER
ECC received a call from:    Name of Caller: Jacqueline Osei     Relationship to patient:N/A    Organization name: [facility]     Best contact number: [phone]    Reason for call:Pharmacist Jacqueline Osei would like a call back to clarify an Rx for tri glide. The reference number is [id number]. He needs to know if the medication should be brand or generic. Please advise.

## 2020-10-15 ENCOUNTER — TELEPHONE (OUTPATIENT)
Dept: SURGERY | Age: 80
End: 2020-10-15

## 2020-10-15 NOTE — TELEPHONE ENCOUNTER
Message with contact info provided for pt to contact our office to schedule referral from Dr. Harpreet Alvarado. 2nd attempt.

## 2020-12-01 RX ORDER — BUDESONIDE AND FORMOTEROL FUMARATE DIHYDRATE 160; 4.5 UG/1; UG/1
AEROSOL RESPIRATORY (INHALATION)
Qty: 30.6 G | Refills: 3 | Status: SHIPPED | OUTPATIENT
Start: 2020-12-01 | End: 2021-01-01 | Stop reason: SDUPTHER

## 2020-12-01 RX ORDER — KETOCONAZOLE 20 MG/G
CREAM TOPICAL
Qty: 60 G | Refills: 11 | Status: SHIPPED | OUTPATIENT
Start: 2020-12-01 | End: 2021-01-08 | Stop reason: SDUPTHER

## 2020-12-02 ENCOUNTER — TELEPHONE (OUTPATIENT)
Dept: PRIMARY CARE CLINIC | Age: 80
End: 2020-12-02

## 2020-12-03 NOTE — TELEPHONE ENCOUNTER
Referral Order     Order    Awilda Maurer MD, Oncology, [facility] [place] [place] (Order # [id number]) on 10/02/2020    View Encounter       Scheduling Instructions     [facility], [address] [place] [address], MD   [address]   [place] [address]   Phone: [phone]   Fax: [phone]      Tell patient to make an appointment with  for osteoporosis treatment for the injectable medication

## 2020-12-28 ENCOUNTER — TELEPHONE (OUTPATIENT)
Dept: PRIMARY CARE CLINIC | Age: 80
End: 2020-12-28

## 2020-12-28 NOTE — TELEPHONE ENCOUNTER
----- Message from Keily Denny sent at 12/28/2020  2:32 PM EST -----  Subject: Message to Provider    QUESTIONS  Information for Provider? Pt called would like to talk to Dr. Talia Gibson. She   moved to [facility]. She is wondering how to go about getting her   medications   make sure correct quantity. Last time [facility] only gave her 1 mo   supply instead of 3 months.   ---------------------------------------------------------------------------  --------------  CALL BACK INFO  What is the best way for the office to contact you? OK to leave message on   voicemail   OK to respond with electronic message via [employer] portal (only for   patients who have registered [employer] account)  Preferred Call Back Phone Number? [phone]  ---------------------------------------------------------------------------  --------------  SCRIPT ANSWERS  Relationship to Patient?  Self

## 2021-01-01 DIAGNOSIS — E55.9 VITAMIN D DEFICIENCY: ICD-10-CM

## 2021-01-01 DIAGNOSIS — J45.40 MODERATE PERSISTENT ASTHMA WITHOUT COMPLICATION: ICD-10-CM

## 2021-01-01 DIAGNOSIS — K21.9 GASTROESOPHAGEAL REFLUX DISEASE WITHOUT ESOPHAGITIS: ICD-10-CM

## 2021-01-01 DIAGNOSIS — I10 ESSENTIAL HYPERTENSION: ICD-10-CM

## 2021-01-01 DIAGNOSIS — E78.2 MIXED HYPERLIPIDEMIA: ICD-10-CM

## 2021-01-01 RX ORDER — BUDESONIDE AND FORMOTEROL FUMARATE DIHYDRATE 160; 4.5 UG/1; UG/1
AEROSOL RESPIRATORY (INHALATION)
Qty: 3 INHALER | Refills: 0 | Status: SHIPPED | OUTPATIENT
Start: 2021-01-01 | End: 2021-01-08 | Stop reason: SDUPTHER

## 2021-01-01 RX ORDER — ACETAMINOPHEN 160 MG
1 TABLET,DISINTEGRATING ORAL DAILY
Qty: 90 CAPSULE | Refills: 0 | Status: SHIPPED | OUTPATIENT
Start: 2021-01-01 | End: 2021-01-08 | Stop reason: SDUPTHER

## 2021-01-01 RX ORDER — BEMPEDOIC ACID 180 MG/1
1 TABLET, FILM COATED ORAL DAILY
Qty: 90 TABLET | Refills: 0 | Status: SHIPPED | OUTPATIENT
Start: 2021-01-01 | End: 2021-01-08

## 2021-01-01 RX ORDER — DILTIAZEM HYDROCHLORIDE 240 MG/1
240 CAPSULE, COATED, EXTENDED RELEASE ORAL DAILY
Qty: 90 CAPSULE | Refills: 0 | Status: SHIPPED | OUTPATIENT
Start: 2021-01-01 | End: 2021-01-08 | Stop reason: SDUPTHER

## 2021-01-01 RX ORDER — FAMOTIDINE 20 MG/1
20 TABLET, FILM COATED ORAL 2 TIMES DAILY
Qty: 180 TABLET | Refills: 0 | Status: SHIPPED | OUTPATIENT
Start: 2021-01-01 | End: 2021-01-08 | Stop reason: SDUPTHER

## 2021-01-01 RX ORDER — SPIRONOLACTONE 25 MG/1
TABLET ORAL
Qty: 90 TABLET | Refills: 0 | Status: SHIPPED | OUTPATIENT
Start: 2021-01-01 | End: 2021-01-08 | Stop reason: SDUPTHER

## 2021-01-01 NOTE — TELEPHONE ENCOUNTER
Let patient know she will need to find a new provider and her new city soon. I can give her a three month supply on her medications but cannot refill them. She must get into see a new provider. She will need blood work and evaluation before medications can be refilled.

## 2021-01-06 ENCOUNTER — TELEPHONE (OUTPATIENT)
Dept: PRIMARY CARE CLINIC | Age: 81
End: 2021-01-06

## 2021-01-06 NOTE — TELEPHONE ENCOUNTER
----- Message from Xochitl Doll sent at 1/5/2021 11:27 AM EST -----  Subject: Message to Provider    QUESTIONS  Information for Provider? Pt would like to schedule a virtual appointment   for medication follow up with Dr. oRsa White before she retires also would   like to be given a new pcp that can follow her care. Also would like to   know if she should take the covid vaccine.   ---------------------------------------------------------------------------  --------------  CALL BACK INFO  What is the best way for the office to contact you? OK to leave message on   voicemail  Preferred Call Back Phone Number? [phone]  ---------------------------------------------------------------------------  --------------  SCRIPT ANSWERS  Relationship to Patient?  Self

## 2021-01-08 ENCOUNTER — VIRTUAL VISIT (OUTPATIENT)
Dept: PRIMARY CARE CLINIC | Age: 81
End: 2021-01-08
Payer: MEDICARE

## 2021-01-08 VITALS — SYSTOLIC BLOOD PRESSURE: 126 MMHG | TEMPERATURE: 97.2 F | DIASTOLIC BLOOD PRESSURE: 77 MMHG | HEART RATE: 86 BPM

## 2021-01-08 DIAGNOSIS — E78.2 MIXED HYPERLIPIDEMIA: ICD-10-CM

## 2021-01-08 DIAGNOSIS — J01.00 ACUTE MAXILLARY SINUSITIS, RECURRENCE NOT SPECIFIED: Primary | ICD-10-CM

## 2021-01-08 DIAGNOSIS — K21.9 GASTROESOPHAGEAL REFLUX DISEASE WITHOUT ESOPHAGITIS: ICD-10-CM

## 2021-01-08 DIAGNOSIS — E55.9 VITAMIN D DEFICIENCY: ICD-10-CM

## 2021-01-08 DIAGNOSIS — R73.03 PREDIABETES: ICD-10-CM

## 2021-01-08 DIAGNOSIS — I10 ESSENTIAL HYPERTENSION: ICD-10-CM

## 2021-01-08 DIAGNOSIS — J45.40 MODERATE PERSISTENT ASTHMA WITHOUT COMPLICATION: ICD-10-CM

## 2021-01-08 PROCEDURE — 99213 OFFICE O/P EST LOW 20 MIN: CPT | Performed by: INTERNAL MEDICINE

## 2021-01-08 RX ORDER — DOXYCYCLINE HYCLATE 100 MG
100 TABLET ORAL 2 TIMES DAILY
Qty: 20 TABLET | Refills: 0 | Status: SHIPPED | OUTPATIENT
Start: 2021-01-08 | End: 2021-01-18

## 2021-01-08 RX ORDER — ACETAMINOPHEN 160 MG
1 TABLET,DISINTEGRATING ORAL DAILY
Qty: 90 CAPSULE | Refills: 0 | Status: SHIPPED | OUTPATIENT
Start: 2021-01-08

## 2021-01-08 RX ORDER — FAMOTIDINE 20 MG/1
20 TABLET, FILM COATED ORAL 2 TIMES DAILY
Qty: 180 TABLET | Refills: 0 | Status: SHIPPED | OUTPATIENT
Start: 2021-01-08 | End: 2021-10-08 | Stop reason: SDUPTHER

## 2021-01-08 RX ORDER — SPIRONOLACTONE 25 MG/1
TABLET ORAL
Qty: 90 TABLET | Refills: 0 | Status: SHIPPED | OUTPATIENT
Start: 2021-01-08 | End: 2022-06-01 | Stop reason: SDUPTHER

## 2021-01-08 RX ORDER — KETOCONAZOLE 20 MG/G
CREAM TOPICAL
Qty: 60 G | Refills: 11 | Status: SHIPPED | OUTPATIENT
Start: 2021-01-08 | End: 2022-01-18 | Stop reason: SDUPTHER

## 2021-01-08 RX ORDER — EZETIMIBE 10 MG/1
10 TABLET ORAL DAILY
Qty: 90 TABLET | Refills: 3 | Status: SHIPPED | OUTPATIENT
Start: 2021-01-08 | End: 2021-10-08

## 2021-01-08 RX ORDER — DILTIAZEM HYDROCHLORIDE 240 MG/1
240 CAPSULE, COATED, EXTENDED RELEASE ORAL DAILY
Qty: 90 CAPSULE | Refills: 3 | Status: SHIPPED | OUTPATIENT
Start: 2021-01-08 | End: 2021-10-08 | Stop reason: SDUPTHER

## 2021-01-08 RX ORDER — FENOFIBRATE 48 MG/1
48 TABLET, COATED ORAL DAILY
Qty: 90 TABLET | Refills: 3 | Status: SHIPPED | OUTPATIENT
Start: 2021-01-08 | End: 2021-10-08

## 2021-01-08 RX ORDER — BUDESONIDE AND FORMOTEROL FUMARATE DIHYDRATE 160; 4.5 UG/1; UG/1
AEROSOL RESPIRATORY (INHALATION)
Qty: 3 INHALER | Refills: 0 | Status: SHIPPED | OUTPATIENT
Start: 2021-01-08 | End: 2021-10-08

## 2021-01-08 ASSESSMENT — ENCOUNTER SYMPTOMS
BACK PAIN: 0
SINUS PRESSURE: 1
NAUSEA: 0
TROUBLE SWALLOWING: 0
ABDOMINAL PAIN: 0
RHINORRHEA: 0
VOMITING: 0
WHEEZING: 0
DIARRHEA: 0
COUGH: 0
CONSTIPATION: 0
EYE ITCHING: 0
SHORTNESS OF BREATH: 0
EYE PAIN: 0
COLOR CHANGE: 0
CHEST TIGHTNESS: 0
SORE THROAT: 0

## 2021-01-08 NOTE — PROGRESS NOTES
Della Hernnadez (:  1940) is a 79 y.o. female,Established patient, here for evaluation of the following chief complaint(s): No chief complaint on file. ASSESSMENT/PLAN:      No follow-ups on file. SUBJECTIVE/OBJECTIVE:   Diagnosis Orders   1. Acute maxillary sinusitis, recurrence not specified patient instructed to get a Covid-19 test from [facility]. Will start doxycycline hundred milligrams twice a day and take over-the-counter Zyrtec 10 mg daily  doxycycline hyclate (VIBRA-TABS) 100 MG tablet   2. Moderate persistent asthma without complication stable on inhaler. budesonide-formoterol (SYMBICORT) 160-4.5 MCG/ACT AERO   3. Mixed hyperlipidemia Bempedoic Acid it was not covered by insurance. It was too expensive to buy out of pocket. Will restart Zetia 10 mg daily and fenofibrate 48 mg daily. Patient has been statin intolerance due to myalgias with statin therapy. She is been very good on her diet so will get a fasting lipid.  ezetimibe (ZETIA) 10 MG tablet    fenofibrate (TRICOR) 48 MG tablet    Lipid Panel   4. Gastroesophageal reflux disease without esophagitis stable with PRN Pepcid. famotidine (PEPCID) 20 MG tablet   5. Vitamin D deficiency taking vitamin D regularly and explain how this is very important during this SARS-CoV-2 pandemic. Low vitamin D level increase the risk of a poor prognosis should we contract SARS-CoV-2. Cholecalciferol (VITAMIN D3) 50 MCG (2000) CAPS    Vitamin D 25 Hydroxy   6. Essential hypertension controlled on current regimen, continue  spironolactone (ALDACTONE) 25 MG tablet    dilTIAZem (CARDIZEM CD) 240 MG extended release capsule    TSH WITH REFLEX TO FT4    Comprehensive Metabolic Panel    Urinalysis   7.  Prediabetes not symptomatic with polydipsia polyuria will check A1c and urine microalbumin  Hemoglobin A1C    Microalbumin / Creatinine Urine Ratio       Review of Systems Constitutional: Negative for activity change, appetite change, chills, diaphoresis, fatigue, fever and unexpected weight change. HENT: Positive for congestion, postnasal drip and sinus pressure. Negative for dental problem, drooling, ear discharge, ear pain, hearing loss, nosebleeds, rhinorrhea, sneezing, sore throat and trouble swallowing. Eyes: Negative for pain, itching and visual disturbance. Respiratory: Negative for cough, chest tightness, shortness of breath and wheezing. Long history of asthma. Cardiovascular: Negative for chest pain, palpitations and leg swelling. Long history of htn and hyperlipidemia. Gastrointestinal: Negative for abdominal pain, constipation, diarrhea, nausea and vomiting. Colonoscopy with Dr. Arianna Wagner in March of 2011. Also with [facility] in 2015 and one polyp removed. Gerd symptoms control of Pepcid PRN   Endocrine: Negative for cold intolerance, heat intolerance, polydipsia, polyphagia and polyuria. Genitourinary: Negative for difficulty urinating, dysuria, flank pain, frequency, hematuria, menstrual problem, pelvic pain and vaginal bleeding. Urge incontinence. Complete hysterectomy in 1994,   Musculoskeletal: Negative for arthralgias, back pain, gait problem, myalgias, neck pain and neck stiffness. Osteoarthritis of knees doing well for two years after cartilage injection with Dr. Robina Cheadle. Skin: Negative for color change, pallor, rash and wound. Allergic/Immunologic: Negative for environmental allergies and food allergies. Neurological: Negative for dizziness, tremors, seizures, syncope, facial asymmetry, speech difficulty, weakness, light-headedness, numbness and headaches. Pt complained of numbness and decreased sensation at bottom of feet    Hematological: Negative for adenopathy. Does not bruise/bleed easily. Mammogram in March of 2011, at [address] [place] [address] at the [facility]. Psychiatric/Behavioral: Negative. Negative for agitation, confusion, decreased concentration, dysphoric mood and sleep disturbance. No flowsheet data found. Physical Exam    [INSTRUCTIONS:  \"[x]\" Indicates a positive item  \"[]\" Indicates a negative item  -- DELETE ALL ITEMS NOT EXAMINED]    Constitutional: [x] Appears well-developed and well-nourished [x] No apparent distress      [] Abnormal -     Mental status: [x] Alert and awake  [x] Oriented to person/place/time [x] Able to follow commands    [] Abnormal -     Eyes:   EOM    [x]  Normal    [] Abnormal -   Sclera  [x]  Normal    [] Abnormal -          Discharge [x]  None visible   [] Abnormal -     HENT: [x] Normocephalic, atraumatic  [] Abnormal -   [x] Mouth/Throat: Mucous membranes are moist    External Ears [x] Normal  [] Abnormal -    Neck: [x] No visualized mass [] Abnormal -     Pulmonary/Chest: [x] Respiratory effort normal   [x] No visualized signs of difficulty breathing or respiratory distress        [] Abnormal -      Musculoskeletal:   [x] Normal gait with no signs of ataxia         [x] Normal range of motion of neck        [] Abnormal -     Neurological:        [x] No Facial Asymmetry (Cranial nerve 7 motor function) (limited exam due to video visit)          [x] No gaze palsy        [] Abnormal -          Skin:        [x] No significant exanthematous lesions or discoloration noted on facial skin         [] Abnormal -            Psychiatric:       [x] Normal Affect [] Abnormal -        [x] No Hallucinations    Other pertinent observable physical exam findings:-          On this date 01/08/21 I have spent 25  minutes reviewing previous notes, test results and face to face (virtual) with the patient discussing the diagnosis and importance of compliance with the treatment plan as well as documenting on the day of the visit. Jenice Seip is a 79 y.o. female being evaluated by a Virtual Visit (video visit) encounter to address concerns as mentioned above. A caregiver was present when appropriate. Due to this being a TeleHealth encounter (During EOF-87 public health emergency), evaluation of the following organ systems was limited: Vitals/Constitutional/EENT/Resp/CV/GI//MS/Neuro/Skin/Heme-Lymph-Imm. Pursuant to the emergency declaration under the [address] [place] [address] and the [employer] Act, this Virtual Visit was conducted with patient's (and/or legal guardian's) consent, to reduce the patient's risk of exposure to COVID-19 and provide necessary medical care. The patient (and/or legal guardian) has also been advised to contact this office for worsening conditions or problems, and seek emergency medical treatment and/or call 911 if deemed necessary. Patient identification was verified at the start of the visit: Yes    Services were provided through a video synchronous discussion virtually to substitute for in-person clinic visit. Patient was located at home and provider was located in office or at home. An electronic signature was used to authenticate this note.     --Richar Jackson MD

## 2021-10-08 ENCOUNTER — OFFICE VISIT (OUTPATIENT)
Dept: FAMILY MEDICINE CLINIC | Age: 81
End: 2021-10-08
Payer: MEDICARE

## 2021-10-08 VITALS
HEART RATE: 82 BPM | SYSTOLIC BLOOD PRESSURE: 100 MMHG | DIASTOLIC BLOOD PRESSURE: 60 MMHG | OXYGEN SATURATION: 98 % | HEIGHT: 62 IN | BODY MASS INDEX: 32.94 KG/M2 | WEIGHT: 179 LBS

## 2021-10-08 DIAGNOSIS — M79.10 MUSCLE PAIN: ICD-10-CM

## 2021-10-08 DIAGNOSIS — K21.9 GASTROESOPHAGEAL REFLUX DISEASE WITHOUT ESOPHAGITIS: ICD-10-CM

## 2021-10-08 DIAGNOSIS — E78.2 MIXED HYPERLIPIDEMIA: ICD-10-CM

## 2021-10-08 DIAGNOSIS — R73.03 PREDIABETES: ICD-10-CM

## 2021-10-08 DIAGNOSIS — E55.9 VITAMIN D DEFICIENCY: ICD-10-CM

## 2021-10-08 DIAGNOSIS — I27.20 PULMONARY HYPERTENSION (HCC): ICD-10-CM

## 2021-10-08 DIAGNOSIS — Z00.00 ENCOUNTER FOR MEDICAL EXAMINATION TO ESTABLISH CARE: Primary | ICD-10-CM

## 2021-10-08 DIAGNOSIS — I10 ESSENTIAL HYPERTENSION: ICD-10-CM

## 2021-10-08 PROBLEM — R10.32 LLQ PAIN: Status: RESOLVED | Noted: 2020-06-26 | Resolved: 2021-10-08

## 2021-10-08 PROCEDURE — 99214 OFFICE O/P EST MOD 30 MIN: CPT | Performed by: STUDENT IN AN ORGANIZED HEALTH CARE EDUCATION/TRAINING PROGRAM

## 2021-10-08 PROCEDURE — 3288F FALL RISK ASSESSMENT DOCD: CPT | Performed by: STUDENT IN AN ORGANIZED HEALTH CARE EDUCATION/TRAINING PROGRAM

## 2021-10-08 RX ORDER — DILTIAZEM HYDROCHLORIDE 240 MG/1
240 CAPSULE, COATED, EXTENDED RELEASE ORAL DAILY
Qty: 90 CAPSULE | Refills: 3 | Status: SHIPPED | OUTPATIENT
Start: 2021-10-08 | End: 2021-10-15 | Stop reason: SDUPTHER

## 2021-10-08 RX ORDER — MULTIVIT WITH MINERALS/LUTEIN
250 TABLET ORAL DAILY
COMMUNITY

## 2021-10-08 RX ORDER — CALCIUM CARBONATE 500(1250)
500 TABLET ORAL DAILY
COMMUNITY

## 2021-10-08 RX ORDER — FAMOTIDINE 20 MG/1
20 TABLET, FILM COATED ORAL 2 TIMES DAILY
Qty: 180 TABLET | Refills: 1 | Status: SHIPPED | OUTPATIENT
Start: 2021-10-08 | End: 2021-10-13 | Stop reason: SDUPTHER

## 2021-10-08 SDOH — ECONOMIC STABILITY: FOOD INSECURITY: WITHIN THE PAST 12 MONTHS, THE FOOD YOU BOUGHT JUST DIDN'T LAST AND YOU DIDN'T HAVE MONEY TO GET MORE.: NEVER TRUE

## 2021-10-08 SDOH — ECONOMIC STABILITY: FOOD INSECURITY: WITHIN THE PAST 12 MONTHS, YOU WORRIED THAT YOUR FOOD WOULD RUN OUT BEFORE YOU GOT MONEY TO BUY MORE.: NEVER TRUE

## 2021-10-08 ASSESSMENT — SOCIAL DETERMINANTS OF HEALTH (SDOH): HOW HARD IS IT FOR YOU TO PAY FOR THE VERY BASICS LIKE FOOD, HOUSING, MEDICAL CARE, AND HEATING?: NOT HARD AT ALL

## 2021-10-08 ASSESSMENT — PATIENT HEALTH QUESTIONNAIRE - PHQ9
1. LITTLE INTEREST OR PLEASURE IN DOING THINGS: 0
SUM OF ALL RESPONSES TO PHQ QUESTIONS 1-9: 0
SUM OF ALL RESPONSES TO PHQ9 QUESTIONS 1 & 2: 0
SUM OF ALL RESPONSES TO PHQ QUESTIONS 1-9: 0
2. FEELING DOWN, DEPRESSED OR HOPELESS: 0
SUM OF ALL RESPONSES TO PHQ QUESTIONS 1-9: 0

## 2021-10-08 NOTE — PROGRESS NOTES
[address] [facility] Note    Date: 10/8/2021      Assessment/Plan:     1. Encounter for medical examination to establish care  Had recent labs March 2021  Due for tdap, will send us record  To get shingles vaccine at pharmacy  To return for AWV    2. Pulmonary hypertension ([facility] [address]  Follows w/ cardiology, due for follow up w/ Dr. Elmo Hardin    3. Essential hypertension  Controlled, continue cardizem daily and spironolactone MWF (per patient preference); refill cardizem  - dilTIAZem (CARDIZEM CD) 240 MG extended release capsule; Take 1 capsule by mouth daily  Dispense: 90 capsule; Refill: 3  - Comprehensive Metabolic Panel; Future    4. Gastroesophageal reflux disease without esophagitis  Controlled w/ pepcid, refill  - famotidine (PEPCID) 20 MG tablet; Take 1 tablet by mouth 2 times daily  Dispense: 180 tablet; Refill: 1    5. Prediabetes  Diet controlled, A1c 6.2 on 3/2021    6. Vitamin D deficiency  Takes Vitamin D supplement  - VITAMIN D 25 HYDROXY; Future    7. Mixed hyperlipidemia  Diet controlled, multiple side effects int he past w/ meds  Gave diet/exercise recommendations   3/2021    8. Muscle pain of neck/scalp muscles  voltaren gel prn and heating pad prn  If more frequen to follow up    Return in about 1 month (around 11/8/2021) for annual wellness visit. Discussed medication(s) risks, benefits, side effects, adverse reactions and interactions with patient. Patient voiced understanding. Subjective/Objective:     Chief Complaint   Patient presents with    New Patient       HPI   Random pain on head when moves her neck in all directions, has had for a few yars. Also gets pain in neck when looks down at screen fora  While, moving from this position helsp make it feel better and putting heating pad and CBD on it.     PCP retired, moved to [facility] for 1 filippo and now back living w/ daughter but needs new PCP    138 LDL 3/2021 not on any meds b/c had SE w/ meds (previously on statin, fenofibrate and zetia)  6.2 A1c 3/2021 diet controlled    HLD/HTN/pulm HTN follows w/ cardiology due to see cardiology [place]  Hemorrhoids resolved  Reflux better w/ pepcid, colonoscopy/EGD 2019   Vitamin D deficiency taking vitamin D  Pre DM diet controlled    HTN- diltiazem, spironolactone (MWF)  120/70s at home  No chest pain/SOB. HLD multiple statins gave muscle pain. 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 Diet/exercise- used to do water aerobics  HM-    cbd massage oil  Juice plus supplements  Ketoconazole for intertrigo  HC cream for anal itching form hemorrhoids    Wt Readings from Last 3 Encounters:   10/08/21 179 lb (81.2 kg)   09/23/20 177 lb (80.3 kg)   07/31/20 183 lb (83 kg)     Body mass index is 32.74 kg/m². BP Readings from Last 3 Encounters:   10/08/21 100/60   01/08/21 126/77   09/23/20 133/69     The ASCVD Risk score (Sari Jones, et al., 2013) failed to calculate for the following reasons:     The 2013 ASCVD risk score is only valid for ages 36 to 78    ROS: denies nausea/vomiting, fevers, chills, chest pain, shortness of breath, diarrhea, constipation, blood in the urine or stool         Patient Active Problem List   Diagnosis    Prediabetes    Vitamin D deficiency    Internal hemorrhoid, bleeding    Liver mass    Renal calculus    Sciatica    Asthma exacerbation    Mixed hyperlipidemia    Bilateral carpal tunnel syndrome    Essential hypertension    Pulmonary hypertension (HCC)    Moderate mitral insufficiency    Chronic nausea    Gastroesophageal reflux disease without esophagitis    Multiple food allergies    Personal history of colonic polyps     Past Medical History:   Diagnosis Date    Allergic rhinitis     Asthma     Chronic back pain     COPD (chronic obstructive pulmonary disease) (HCC)     Dizziness     Fibromyalgia     GERD (gastroesophageal reflux disease)     Hyperlipidemia     Hypertension     Internal hemorrhoid, bleeding 12/12/2012    Hemorrhoid surgery  In 2018    Irritable bowel syndrome     LLQ pain 6/26/2020    Obesity     Osteoarthritis     Osteopenia     Rash     Renal calculus 5/6/2013    Restless legs syndrome     Sciatica 7/16/2014    Sinusitis     Tinnitus     TMJ dysfunction     Urinary incontinence        Past Surgical History:   Procedure Laterality Date    APPENDECTOMY      CHOLECYSTECTOMY      COLONOSCOPY  2011     Dr Esdras Vela  07/10/2018    at [facility] done by Dr. Willy Carcamo, [id number] [facility]       Current Outpatient Medications   Medication Sig Dispense Refill    Nutritional Supplements (JUICE PLUS FIBRE PO) Take by mouth      Ascorbic Acid (VITAMIN C) 250 MG tablet Take 250 mg by mouth daily      calcium carbonate (OSCAL) 500 MG TABS tablet Take 500 mg by mouth daily      Zinc 23 MG LOZG Take by mouth      dilTIAZem (CARDIZEM CD) 240 MG extended release capsule Take 1 capsule by mouth daily 90 capsule 3    famotidine (PEPCID) 20 MG tablet Take 1 tablet by mouth 2 times daily 180 tablet 1    Cholecalciferol (VITAMIN D3) 50 MCG (2000 UT) CAPS Take 1 capsule by mouth daily If not covered , will buy over the counter. 90 capsule 0    spironolactone (ALDACTONE) 25 MG tablet TAKE 1 TABLET DAILY 90 tablet 0    ketoconazole (NIZORAL) 2 % cream APPLY TOPICALLY UNDER BREAST OR IN GROIN TWICE A DAY AS NEEDED FOR RASH 60 g 11    multivitamin (THERAGRAN) per tablet Take 1 tablet by mouth daily. Member of Clubs or Organizations:     Attends Club or Organization Meetings:     Marital Status:    Intimate Partner Violence:     Fear of Current or Ex-Partner:     Emotionally Abused:     Physically Abused:     Sexually Abused:      Family History   Problem Relation Age of Onset    Arthritis Mother     Cancer Mother         breast cancer    Diabetes Mother     Heart Disease Mother     High Blood Pressure Mother     Kidney Disease Mother     High Cholesterol Mother     Arthritis Father     Cancer Father         lung cancer    Diabetes Father     Heart Disease Father     High Blood Pressure Father     Kidney Disease Father     High Cholesterol Father     Diabetes Sister     Heart Disease Brother 58        coronary artery disease with stent    Diabetes Brother     Heart Disease Brother 72        cad         Vitals:  /60   Pulse 82   Ht 5' 2\" (1.575 m)   Wt 179 lb (81.2 kg)   SpO2 98%   BMI 32.74 kg/m²     Physical Exam   General:  Well-appearing, no acute distress, alert, non-toxic  HEENT:  Normocephalic, atraumatic, without lymphadenopathy, EOMI, neck supple  Cardiovascular: normal heart rate, normal rhythm, no murmurs, rubs or gallops  Respiratory: normal breath sounds, good air movement, no respiratory distress, no wheezing, rales or rhonchi  GI: bowel sounds normal, soft, non-distended, no tenderness, no masses or peritoneal signs  Extremities: intact distal pulses, warm, dry, well perfused, without clubbing, cyanosis or edema, normal movement of all extremities. No joint swelling, deformity or tenderness.   Skin:  No rash, warm and dry  PSYCH:  alert and oriented x 3; normal affect  NEURO:  CN2-12 grossly intact, normal motor function, normal sensory function, normal speech, no gross focal deficits noted, gait within normal  MSK: TTP over left scalp after moving neck in all directions, full neck ROM, slight tenderness over left SCM after neck ROM, no skin findings    Orders Placed This Encounter   Procedures    Comprehensive Metabolic Panel     Standing Status:   Future     Number of Occurrences:   1     Standing Expiration Date:   10/8/2022    VITAMIN D 25 HYDROXY     Standing Status:   Future     Number of Occurrences:   1     Standing Expiration Date:   10/8/2022       Josseline Lucero MD    10/8/2021 3:19 PM    Documentation was done using voice recognition dragon software. Every effort was made to ensure accuracy; however, inadvertent, unintentional computerized transcription errors may be present.

## 2021-10-08 NOTE — PATIENT INSTRUCTIONS
voltaren gel as needed   Heating pad  Send us record of tdap vaccine  To get shingles vaccine    Come back before end of year for annual wellness visit    Patient Education     High Cholesterol: Heart-Healthy Diet  Learn how to eat a heart-healthy diet and reduce your risk of heart disease. 1. Set a target. You know you've got to get your cholesterol number down, but how low do you need to go? That depends on several factors, including your personal and family history of heart disease, as well as whether you have cardiovascular risk factors, such as obesity, high blood pressure, diabetes, and smoking. If your risk is deemed high, \"most doctors will treat for a target LDL of less than 70,\" says Chase Goins MD, a cardiologist in private practice in [place], [place]. If your risk is moderate, a target LDL of under 130 is generally Lila Nation says. If your risk is low, less than 160 is a reasonable target. \"The trend now is to treat people earlier, especially if they have two or more risk factors,\" he says. 2. Consider medication. Lifestyle modifications make sense for anyone with elevated cholesterol. But if your cardiovascular risk is high, you may also need to take a cholesterol-lowering drug. Summer Fermin MD, medical director of the [facility] in [facility], calls drug therapy \"the only thing that will work fast\" to lower high cholesterol. \"Everyone should do the basics, like stopping smoking and losing weight,\" José Pilar tells [employer]. \"But these things lower the risk only modestly. They're nothing to write home about. \"  Pérez Kazakh agrees. \"Lifestyle modifications are important, but we should also be emphasizing the benefits of medication when appropriate,\" he says. Several types of cholesterol-lowering medication are available, including niacin, bile acid resins, and fibrates. But statins are the treatment of choice for most individuals.  \"Statins can lower LDL cholesterol by 20% to 50%\" says Vargas Streeter MD,  of medicine at the [facility] in [place]. 3. Get moving. In addition to lowering LDL \"bad\" cholesterol, regular physical activity can raise HDL \"good\" cholesterol by up to 10%. The benefits come even with moderate exercise, such as brisk walking. Danuta Lopez MD, professor of medicine at [facility] in [place], [place]., urges his patients to go for a 45-minute walk after supper. Ramez tells [employer], \"I ask people to get a pedometer and aim for 10,000 steps a day. If you work at a desk, get up and walk around for five minutes every hour. \"  Whatever form your exercise takes, the key is to do it with regularity. \"Some experts recommend seven days a week, although I think five days is more realistic,\" Doris Goodman says. 4. Avoid saturated fat. Doctors used to think that the key to lowering high cholesterol was to cut back on eggs and other cholesterol-rich foods. But now it's clear that dietary cholesterol isn't the main culprit. \"Eggs don't do all that much [to raise cholesterol],\" Bairon Mott says. \"You don't want to be throwing down six eggs a day, but recent data suggest that it's really saturated fat\" that causes increases in cholesterol. And if you cooked your eggs in a slab of butter, don't overlook the fat in the butter. \"One of the first things to do when you're trying to lower your cholesterol level is to take saturated fat down a few notches,\" says Edmond Browning, MPH, RD, the [employer] of several nutrition books, including the forthcoming Tell Me What to Eat If I Have Heart Disease. \"The second thing to do is to start eating more 'smart' fats,\" Jigar Goode says. She recommends substituting canola oil or olive oil for vegetable oil, butter, stick margarine, lard, or shortening while cutting back on meat and eating more fish. 5. Eat more fiber.   Fruits and vegetables, including whole grains, are good sources not only of heart-healthy antioxidants but also cholesterol-lowering dietary fiber. Soluble fiber, in particular, can help lower cholesterol. Deeptius Boldenkeon says it \"acts like a sponge to absorb cholesterol \"in the digestive tract. Good sources of soluble fiber include dried beans, oats, and barley, as well as fiber products containing psyllium. 6. Go fish. Fish and fish oil are chockablock with cholesterol-lowering omega-3 fatty acids. \"Fish oil supplements can have a profound effect on cholesterol and triglycerides,\" Sherine Bennett says. \"There's a lot of scientific evidence to support their use. \" Fish oil is considered to be quite safe, but check with your doctor first if you are taking an anti-clotting medication. Belle recommends eating fish two or three times a week. \"Lafferty is great, as it has lots of omega-3s,\"she says. But even canned tuna has omega-3s, and it's more consumer-friendly. The American Heart Association also recommends fish as the preferable source of omega-3s, but fish oil capsule supplements can be considered after consultation with your physician. Plant sources of omega-3s include soybeans, canola, flaxseeds, walnuts, and their oils, but they don't provide the same omega-3s as fish. The biggest heart benefits have been linked to omega-3s found in fish. 7. Drink up. Moderate consumption of alcohol can raise levels of HDL \"good\" cholesterol by as much as 10%. Doctors say up to one drink a day makes sense for women, up to two a day for men. But given the risks of excessive drinking, the American Heart Association cautions against increasing your alcohol intake or starting to drink if you don't already. 8. Drink green. Penne Felty suggests green tea as a healthier alternative to sodas and sugary beverages. Indeed, research in both animals and humans has shown that green tea contains compounds that can help lower LDL cholesterol.  In a small-scale study conducted recently in [facility], people who took capsules containing a green tea extract experienced a 4.5% reduction in LDL cholesterol. 9. Eat nuts. Extensive research has demonstrated that regular consumption of nuts can bring modest reductions in cholesterol. Walnuts and almonds seem particularly beneficial. But nuts are high in calories, so limit yourself to a handful a day, experts say. 10. Switch spreads. Recent years have seen the introduction of margarine-like spreads and other foods fortified with cholesterol-lowering plant compounds known as stanols. 11. Don't smoke. Smoking lowers levels of HDL \"good\" cholesterol and is a major risk factor for heart disease. High Cholesterol: Healthy Choices When Eating Out  Many restaurants offer delicious, low-fat, low-cholesterol meals. These tips will help you make eating out healthy and enjoyable. Before You Order   If you are familiar with the menu, decide what to order before entering the restaurant. This tactic will help you avoid any tempting foods that may not be so healthy.  If you are trying a new restaurant, take time study the menu in order to avoid making unhealthy decisions.  Have the  remove temptations (butter, for example) from the table.  Drink two full glasses of water before your food arrives.  Avoid foods described in the following way: buttery, buttered, fried, pan-fried, creamed, escalloped, au gratin (with cheese), or a la mode (with ice cream).  If you want to eat bread, choose Cari toast or whole-grain rolls without butter or margarine. When You Order   Order foods that are steamed, broiled, grilled, stir-fried, or roasted.  Order potatoes baked, boiled, or roasted instead of fried. Ask the  to leave off the butter and sour cream.    Order first so that you will not be influenced by other's choices.  For appetizers, order broth-based soups such as minestrone or gazpacho.     Choose seafood, chicken, or lean meat rather than fatty meats; remove all cholesterol levels help your doctor find out your risk for having a heart attack or stroke. How can you help prevent high cholesterol? A heart-healthy lifestyle can help you prevent high cholesterol and lower your risk for a heart attack and stroke. · Eat heart-healthy foods. ? Eat fruits, vegetables, whole grains, beans, and other high-fiber foods. ? Eat lean proteins, such as seafood, lean meats, beans, nuts, and soy products. ? Eat healthy fats, such as canola and olive oil. ? Choose foods that are low in saturated fat. ? Limit sodium and alcohol. ? Limit drinks and foods with added sugar. · Be active. Try to do moderate activity at least 2½ hours a week. Or try vigorous activity at least 1¼ hours a week. You may want to walk or try other activities, such as running, swimming, cycling, or playing tennis or team sports. · Stay at a healthy weight. Lose weight if you need to. · Don't smoke. If you need help quitting, talk to your doctor about stop-smoking programs and medicines. These can increase your chances of quitting for good. How is high cholesterol treated? The goal of treatment is to reduce your chances of having a heart attack or stroke. The goal is not to lower your cholesterol numbers only. · Have a heart-healthy lifestyle. This includes eating healthy foods, not smoking, losing weight, and being more active. · You may choose to take medicine. Follow-up care is a key part of your treatment and safety. Be sure to make and go to all appointments, and call your doctor if you are having problems. It's also a good idea to know your test results and keep a list of the medicines you take. Where can you learn more? Go to https://rochelle.[employer]. org and sign in to your [employer] account. Enter E031 in the [employer] box to learn more about \"Learning About High Cholesterol. \"     If you do not have an account, please click on the \"Sign Up Now\" link.   Current as of: April 29, 2021               Content Version: 13.0  © 6840-1971 Healthwise, Incorporated. Care instructions adapted under license by [facility] ([facility]). If you have questions about a medical condition or this instruction, always ask your healthcare professional. [address] any warranty or liability for your use of this information.

## 2021-10-13 DIAGNOSIS — I10 ESSENTIAL HYPERTENSION: ICD-10-CM

## 2021-10-13 DIAGNOSIS — K21.9 GASTROESOPHAGEAL REFLUX DISEASE WITHOUT ESOPHAGITIS: ICD-10-CM

## 2021-10-13 NOTE — TELEPHONE ENCOUNTER
----- Message from Natacha Timothyshahla sent at 10/11/2021  9:48 AM EDT -----  Subject: Medication Problem    QUESTIONS  Name of Medication? dilTIAZem (CARDIZEM CD) 240 MG extended release   capsule  Patient-reported dosage and instructions? 240 MG daily  What question or problem do you have with the medication? Medication,   diltiazem and the pepcid scripts were sent to the wrong pharmacy, [facility].   This should have gone to [facility]. This would cost more for the pt. Please send to [facility]. Preferred Pharmacy? [address] [facility]  Pharmacy phone number (if available)? [phone]  Additional Information for Provider? These medications should be for 3   months. Pt is requesting that her only pharmacy to be [facility]. Please remove the [facility]. Thank you.  ---------------------------------------------------------------------------  --------------  CALL BACK INFO  What is the best way for the office to contact you? OK to leave message on   voicemail  Preferred Call Back Phone Number? [phone]  ---------------------------------------------------------------------------  --------------  SCRIPT ANSWERS  Relationship to Patient?  Self

## 2021-10-15 RX ORDER — FAMOTIDINE 20 MG/1
20 TABLET, FILM COATED ORAL 2 TIMES DAILY
Qty: 180 TABLET | Refills: 3 | Status: SHIPPED | OUTPATIENT
Start: 2021-10-15 | End: 2022-06-01 | Stop reason: SDUPTHER

## 2021-10-15 RX ORDER — DILTIAZEM HYDROCHLORIDE 240 MG/1
240 CAPSULE, COATED, EXTENDED RELEASE ORAL DAILY
Qty: 90 CAPSULE | Refills: 3 | Status: SHIPPED | OUTPATIENT
Start: 2021-10-15 | End: 2022-04-19

## 2021-11-03 ENCOUNTER — TELEPHONE (OUTPATIENT)
Dept: FAMILY MEDICINE CLINIC | Age: 81
End: 2021-11-03

## 2021-11-03 NOTE — TELEPHONE ENCOUNTER
----- Message from Elsie Mcgrath sent at 11/3/2021  3:38 PM EDT -----  Subject: Appointment Request    Reason for Call: Routine Medicare AWV    QUESTIONS  Type of Appointment? Established Patient  Reason for appointment request? No appointments available during search  Additional Information for Provider? Pt called in to schedule her AWV but   no appts showing at this time. She is looking to have it done around 11am   or noon due to her . Her last AWV was 10/3/20. Please call her back   when you can to schedule   ---------------------------------------------------------------------------  --------------  CALL BACK INFO  What is the best way for the office to contact you? OK to leave message on   voicemail  Preferred Call Back Phone Number? [phone]  ---------------------------------------------------------------------------  --------------  SCRIPT ANSWERS  Relationship to Patient? Self  (If the patient has Medicare as their primary insurance coverage ask this   question) Are you requesting a Medicare Annual Wellness Visit? Yes   (Is the patient requesting a pap smear with their physical exam?)? No  (Is the patient requesting their annual physical and does not need PAP or   AWV per above?)? Yes   Have you been diagnosed with, awaiting test results for, or told that you   are suspected of having COVID-19 (Coronavirus)? (If patient has tested   negative or was tested as a requirement for work, school, or travel and   not based on symptoms, answer no)? No  Within the past two weeks have you developed any of the following symptoms   (answer no if symptoms have been present longer than 2 weeks or began   more than 2 weeks ago)? Fever or Chills, Cough, Shortness of breath or   difficulty breathing, Loss of taste or smell, Sore throat, Nasal   congestion, Sneezing or runny nose, Fatigue or generalized body aches   (answer no if pain is specific to a body part e.g. back pain), Diarrhea,   Headache?  No  Have you

## 2021-11-09 ENCOUNTER — OFFICE VISIT (OUTPATIENT)
Dept: FAMILY MEDICINE CLINIC | Age: 81
End: 2021-11-09
Payer: MEDICARE

## 2021-11-09 VITALS
BODY MASS INDEX: 34.04 KG/M2 | HEART RATE: 88 BPM | SYSTOLIC BLOOD PRESSURE: 119 MMHG | WEIGHT: 185 LBS | OXYGEN SATURATION: 98 % | HEIGHT: 62 IN | DIASTOLIC BLOOD PRESSURE: 72 MMHG

## 2021-11-09 DIAGNOSIS — Z00.00 ROUTINE GENERAL MEDICAL EXAMINATION AT A HEALTH CARE FACILITY: Primary | ICD-10-CM

## 2021-11-09 DIAGNOSIS — M85.80 OSTEOPENIA, UNSPECIFIED LOCATION: ICD-10-CM

## 2021-11-09 PROCEDURE — G0439 PPPS, SUBSEQ VISIT: HCPCS | Performed by: STUDENT IN AN ORGANIZED HEALTH CARE EDUCATION/TRAINING PROGRAM

## 2021-11-09 ASSESSMENT — PATIENT HEALTH QUESTIONNAIRE - PHQ9
SUM OF ALL RESPONSES TO PHQ QUESTIONS 1-9: 0
SUM OF ALL RESPONSES TO PHQ QUESTIONS 1-9: 0
SUM OF ALL RESPONSES TO PHQ9 QUESTIONS 1 & 2: 0
SUM OF ALL RESPONSES TO PHQ QUESTIONS 1-9: 0
2. FEELING DOWN, DEPRESSED OR HOPELESS: 0
1. LITTLE INTEREST OR PLEASURE IN DOING THINGS: 0

## 2021-11-09 NOTE — PATIENT INSTRUCTIONS
To get shingles vaccine at local pharmacy  To work on moving / staying active safely    Personalized Preventive Plan for Adriana 12 - 11/9/2021  Medicare offers a range of preventive health benefits. Some of the tests and screenings are paid in full while other may be subject to a deductible, co-insurance, and/or copay. Some of these benefits include a comprehensive review of your medical history including lifestyle, illnesses that may run in your family, and various assessments and screenings as appropriate. After reviewing your medical record and screening and assessments performed today your provider may have ordered immunizations, labs, imaging, and/or referrals for you. A list of these orders (if applicable) as well as your Preventive Care list are included within your After Visit Summary for your review. Other Preventive Recommendations:    · A preventive eye exam performed by an eye specialist is recommended every 1-2 years to screen for glaucoma; cataracts, macular degeneration, and other eye disorders. · A preventive dental visit is recommended every 6 months. · Try to get at least 150 minutes of exercise per week or 10,000 steps per day on a pedometer . · Order or download the FREE \"Exercise & Physical Activity: Your Everyday Guide\" from [employer]. Call 8-775.457.5081 or search [employer] online. · You need 6831-8885 mg of calcium and 1803-6941 IU of vitamin D per day. It is possible to meet your calcium requirement with diet alone, but a vitamin D supplement is usually necessary to meet this goal.  · When exposed to the sun, use a sunscreen that protects against both UVA and UVB radiation with an SPF of 30 or greater. Reapply every 2 to 3 hours or after sweating, drying off with a towel, or swimming. · Always wear a seat belt when traveling in a car. Always wear a helmet when riding a bicycle or motorcycle.

## 2021-11-09 NOTE — PROGRESS NOTES
Medicare Annual Wellness Visit  Name: Jeffery Mcintyre Date: 2021   MRN: <[id number]> Sex: Female   Age: [de-identified] y.o. Ethnicity: Non- / Non    : 1940 Race: Gae Robert /  Kaya Blackmon is here for Medicare AWV    Screenings for behavioral, psychosocial and functional/safety risks, and cognitive dysfunction are all negative except as indicated below. These results, as well as other patient data from the [email or web address] form, are documented in Flowsheets linked to this Encounter. Allergies   Allergen Reactions    Latex Itching    Crestor [Rosuvastatin Calcium] Other (See Comments)     Muscle pain    Pcn [Penicillins] Anaphylaxis    Eggs [Egg White]     Flagyl [Metronidazole] Diarrhea     Blood in stool    Lipitor      Muscle pain.  Motrin [Ibuprofen Micronized] Other (See Comments)     Head tightness    Nsaids     Sulfa Antibiotics      hypertension    Aspirin Nausea And Vomiting    Imidazole Antifungals Nausea And Vomiting and Other (See Comments)    Quinolones Nausea And Vomiting         Prior to Visit Medications    Medication Sig Taking? Authorizing Provider   dilTIAZem (CARDIZEM CD) 240 MG extended release capsule Take 1 capsule by mouth daily Yes Kai Colin MD   famotidine (PEPCID) 20 MG tablet Take 1 tablet by mouth 2 times daily Yes Kai Colin MD   Nutritional Supplements (JUICE PLUS FIBRE PO) Take by mouth Yes Historical Provider, MD   Ascorbic Acid (VITAMIN C) 250 MG tablet Take 250 mg by mouth daily Yes Historical Provider, MD   calcium carbonate (OSCAL) 500 MG TABS tablet Take 500 mg by mouth daily Yes Historical Provider, MD   Zinc 23 MG LOZG Take by mouth Yes Historical Provider, MD   Cholecalciferol (VITAMIN D3) 50 MCG (2000) CAPS Take 1 capsule by mouth daily If not covered , will buy over the counter.  Yes Alma Calvillo MD   spironolactone (ALDACTONE) 25 MG tablet TAKE 1 TABLET DAILY Yes Ralph Santana MD   ketoconazole (NIZORAL) 2 % cream APPLY TOPICALLY UNDER BREAST OR IN GROIN TWICE A DAY AS NEEDED FOR RASH Yes Ralph Santana MD   multivitamin [facility]) per tablet Take 1 tablet by mouth daily.    Yes Historical Provider, MD         Past Medical History:   Diagnosis Date    Allergic rhinitis     Asthma     Chronic back pain     COPD (chronic obstructive pulmonary disease) (HCC)     Dizziness     Fibromyalgia     GERD (gastroesophageal reflux disease)     Hyperlipidemia     Hypertension     Internal hemorrhoid, bleeding 12/12/2012    Hemorrhoid surgery  In 2018    Irritable bowel syndrome     LLQ pain 6/26/2020    Obesity     Osteoarthritis     Osteopenia     Rash     Renal calculus 5/6/2013    Restless legs syndrome     Sciatica 7/16/2014    Sinusitis     Tinnitus     TMJ dysfunction     Urinary incontinence        Past Surgical History:   Procedure Laterality Date    APPENDECTOMY      CHOLECYSTECTOMY      COLONOSCOPY  2011     Dr Misael Ku  07/10/2018    at [facility] done by Dr. Lodema Nageotte, [id number] [address]         Family History   Problem Relation Age of Onset    Arthritis Mother     Cancer Mother         breast cancer    Diabetes Mother     Heart Disease Mother     High Blood Pressure Mother     Kidney Disease Mother     High Cholesterol Mother     Arthritis Father     Cancer Father         lung cancer    Diabetes Father     Heart Disease Father     High Blood Pressure Father     Kidney Disease Father     High Cholesterol Father     Diabetes Sister     Heart Disease Brother 58        coronary artery disease with stent    Diabetes Brother     Heart Disease Brother 72        cad       CareTeam (Including outside providers/suppliers regularly involved in providing care):   Patient Care Team:  Donnie Deutsch MD as PCP - General (Family Medicine)  Bard Nieves Hans Byers MD as PCP - [facility] Empaneled Provider  Uriel Castellanos MD as Consulting Physician (Gastroenterology)  Wilma Black MD as Surgeon (General Surgery)   Cardiology - Garett aggarwal dentist  Dr Kori Polk eye doc    Flu shot gets sick w/ never gets. tdap 2022 due for  To get shingles vaccine    Eye, dentist, heart/cardiology and mammogram appointments this month and next month    Metamucil for BMs, doing well w/ this  miralax uses prn    Doing well over all  Needs to work on being more active. 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 Based upon direct observation of the patient, evaluation of cognition reveals recent and remote memory intact. Physical Exam   General:  Well-appearing, no acute distress, alert, non-toxic  HEENT:  Normocephalic, atraumatic, without lymphadenopathy, neck supple, EOMI  Cardiovascular: normal heart rate, normal rhythm, no murmurs, rubs or gallops  Respiratory: normal breath sounds, good air movement, no respiratory distress, no wheezing, rales or rhonchi  GI: bowel sounds normal, soft, non-distended, no tenderness, no masses or peritoneal signs  Extremities: intact distal pulses, warm, dry, well perfused, without clubbing, cyanosis or edema, normal movement of all extremities.  No joint swelling, deformity or tenderness. Skin:  No rash, warm and dry  PSYCH:  alert and oriented x 3; normal affect  NEURO:  CN2-12 grossly intact, normal motor function, normal sensory function, normal speech, no gross focal deficits noted, gait within normal    Patient's complete Health Risk Assessment and screening values have been reviewed and are found in Flowsheets. The following problems were reviewed today and where indicated follow up appointments were made and/or referrals ordered.     Positive Risk Factor Screenings with Interventions:           Health Habits/Nutrition:     Body mass index: (!) 33.83  Health Habits/Nutrition Interventions: used to do water aerobics  · Inadequate physical activity:  patient agrees to increase physical activity as follows: going to work on exercise step and do exercises in bed when she wakes up and try to be more active around her home walking safely  · Nutritional issues:  educational materials for healthy, well-balanced diet provided   · Does two meals a day and snacks, encouraged 3 meals a day       Personalized Preventive Plan   Current Health Maintenance Status  Immunization History   Administered Date(s) Administered    COVID-19, Loaiza Peter, PF, 30mcg/0.3mL 07/10/2021, 07/20/2021    Meningococcal B, OMV (Bexsero) 05/03/2018    Pneumococcal Conjugate 13-valent ([id number]) 09/30/2015    Pneumococcal Polysaccharide ([id number]) 02/23/2012        Health Maintenance   Topic Date Due    DTaP/Tdap/Td vaccine (1 - Tdap) Never done    Shingles Vaccine (1 of 2) Never done    Flu vaccine (1) Never done   [employer] Visit (AWV)  09/24/2021    COVID-19 Vaccine (3 - Booster) 01/20/2022    Breast cancer screen  10/06/2022    Potassium monitoring  10/08/2022    Creatinine monitoring  10/08/2022    Colon cancer screen colonoscopy  08/14/2023    DEXA (modify frequency per FRAX score)  Completed    Pneumococcal 65+ years Vaccine  Completed    Hepatitis A vaccine  Aged Out    Hepatitis B vaccine Aged Out    Hib vaccine  Aged Out    Meningococcal (ACWY) vaccine  Aged Out   due for Tdap will send us records  To get shingles vaccine at pharmacy    Recommendations for Preventive Services Due: see orders and patient instructions/AVS.  . Recommended screening schedule for the next 5-10 years is provided to the patient in written form: see Patient Shanti Yoder was seen today for medicare awv.     Diagnoses and all orders for this visit:    Routine general medical examination at a health care facility    Osteopenia, unspecified location             To get shingles vaccine at local pharmacy  tdap due in 2022  Hx of getting sick w/ flu shot so declines  To work on moving / staying active safely --- going to get exercise sitting machine and work on doing exercises in bed when wakes up and walking throughout house safely  Eye, dentist, heart/cardiology and mammogram appointments this month and next month  Doesn't meet indication for treatment for osteopenia at this time, continue calcium and vitamin D    Follow up in 3 months    Donnie Deutsch MD

## 2021-12-20 ENCOUNTER — OFFICE VISIT (OUTPATIENT)
Dept: CARDIOLOGY CLINIC | Age: 81
End: 2021-12-20
Payer: MEDICARE

## 2021-12-20 VITALS
HEART RATE: 67 BPM | WEIGHT: 186.4 LBS | SYSTOLIC BLOOD PRESSURE: 138 MMHG | DIASTOLIC BLOOD PRESSURE: 64 MMHG | BODY MASS INDEX: 34.09 KG/M2

## 2021-12-20 DIAGNOSIS — R07.9 CHEST PAIN, UNSPECIFIED TYPE: ICD-10-CM

## 2021-12-20 DIAGNOSIS — E78.2 MIXED HYPERLIPIDEMIA: ICD-10-CM

## 2021-12-20 DIAGNOSIS — I10 ESSENTIAL HYPERTENSION: Primary | ICD-10-CM

## 2021-12-20 PROCEDURE — 99214 OFFICE O/P EST MOD 30 MIN: CPT | Performed by: INTERNAL MEDICINE

## 2021-12-20 PROCEDURE — 93000 ELECTROCARDIOGRAM COMPLETE: CPT | Performed by: INTERNAL MEDICINE

## 2021-12-20 RX ORDER — LOSARTAN POTASSIUM 50 MG/1
50 TABLET ORAL DAILY
Qty: 90 TABLET | Refills: 3 | Status: SHIPPED | OUTPATIENT
Start: 2021-12-20 | End: 2022-06-01 | Stop reason: SDUPTHER

## 2021-12-20 ASSESSMENT — ENCOUNTER SYMPTOMS
GASTROINTESTINAL NEGATIVE: 1
CHEST TIGHTNESS: 1
CHOKING: 0
EYES NEGATIVE: 1
ALLERGIC/IMMUNOLOGIC NEGATIVE: 1
APNEA: 0
COUGH: 0
WHEEZING: 0
STRIDOR: 0
SHORTNESS OF BREATH: 0
ABDOMINAL PAIN: 0

## 2021-12-20 NOTE — PROGRESS NOTES
file   Physical Activity:     Days of Exercise per Week: Not on file    Minutes of Exercise per Session: Not on file   Stress:     Feeling of Stress : Not on file   Social Connections:     Frequency of Communication with Friends and Family: Not on file    Frequency of Social Gatherings with Friends and Family: Not on file    Attends Sabianism Services: Not on file    Active Member of Clubs or Organizations: Not on file    Attends Club or Organization Meetings: Not on file    Marital Status: Not on file   Intimate Partner Violence:     Fear of Current or Ex-Partner: Not on file    Emotionally Abused: Not on file    Physically Abused: Not on file    Sexually Abused: Not on file   Housing Stability:     Unable to Pay for Housing in the Last Year: Not on file    Number of [place] in the Last Year: Not on file    Unstable Housing in the Last Year: Not on file       Family History   Problem Relation Age of Onset    Arthritis Mother     Cancer Mother         breast cancer    Diabetes Mother     Heart Disease Mother     High Blood Pressure Mother     Kidney Disease Mother     High Cholesterol Mother     Arthritis Father     Cancer Father         lung cancer    Diabetes Father     Heart Disease Father     High Blood Pressure Father     Kidney Disease Father     High Cholesterol Father     Diabetes Sister     Heart Disease Brother 58        coronary artery disease with stent    Diabetes Brother     Heart Disease Brother 72        cad        has a past medical history of Allergic rhinitis, Asthma, Chronic back pain, COPD (chronic obstructive pulmonary disease) ([facility] [address] Dizziness, Fibromyalgia, GERD (gastroesophageal reflux disease), Hyperlipidemia, Hypertension, Internal hemorrhoid, bleeding, Irritable bowel syndrome, LLQ pain, Obesity, Osteoarthritis, Osteopenia, Rash, Renal calculus, Restless legs syndrome, Sciatica, Sinusitis, Tinnitus, TMJ dysfunction, and Urinary incontinence. Review of Systems   HENT: Negative. Eyes: Negative. Respiratory: Positive for chest tightness. Negative for apnea, cough, choking, shortness of breath, wheezing and stridor. Cardiovascular: Positive for chest pain. Gastrointestinal: Negative. Negative for abdominal pain. Endocrine: Negative. Skin: Negative. Allergic/Immunologic: Negative. Neurological: Negative. Hematological: Negative. Psychiatric/Behavioral: Negative. Vitals:    12/20/21 1513   BP: 138/64   Pulse: 67        8/13/19 ECHO   Summary   Left ventricular cavity size is normal. There is mild left ventricular   hypertrophy. Overall left ventricular systolic function appears normal with   an ejection fraction of 60-65%. No regional wall motion abnormalities are   noted. Normal diastolic function. The mitral valve leaflets are slightly   thickened with normal leaflet mobility. Trivial mitral and tricuspid   regurgitation. Estimated pulmonary artery systolic pressure is at 25 mmHg   assuming a right atrial pressure of 3 mmHg. Mild pulmonic regurgitation   present. Vitals:    12/20/21 1513   BP: 138/64   Pulse: 67       Objective:   Physical Exam  Constitutional:       Appearance: She is well-developed. HENT:      Head: Normocephalic. Eyes:      General: No scleral icterus. Cardiovascular:      Rate and Rhythm: Normal rate and regular rhythm. Heart sounds: Normal heart sounds. No murmur heard. Pulmonary:      Effort: Pulmonary effort is normal.      Breath sounds: Normal breath sounds. Abdominal:      General: Bowel sounds are normal.      Palpations: Abdomen is soft. Musculoskeletal:         General: No tenderness or deformity. Normal range of motion. Cervical back: Normal range of motion. Skin:     General: Skin is warm and dry. Capillary Refill: Capillary refill takes less than 2 seconds. Findings: No erythema.    Neurological:      Mental Status: She is oriented to person, place, and time. Cranial Nerves: No cranial nerve deficit. Psychiatric:         Behavior: Behavior normal.         Thought Content: Thought content normal.         Judgment: Judgment normal.         Current Outpatient Medications   Medication Sig Dispense Refill    dilTIAZem (CARDIZEM CD) 240 MG extended release capsule Take 1 capsule by mouth daily 90 capsule 3    famotidine (PEPCID) 20 MG tablet Take 1 tablet by mouth 2 times daily 180 tablet 3    Nutritional Supplements (JUICE PLUS FIBRE PO) Take by mouth      Ascorbic Acid (VITAMIN C) 250 MG tablet Take 250 mg by mouth daily      calcium carbonate (OSCAL) 500 MG TABS tablet Take 500 mg by mouth daily      Zinc 23 MG LOZG Take by mouth      Cholecalciferol (VITAMIN D3) 50 MCG (2000 UT) CAPS Take 1 capsule by mouth daily If not covered , will buy over the counter. 90 capsule 0    spironolactone (ALDACTONE) 25 MG tablet TAKE 1 TABLET DAILY 90 tablet 0    ketoconazole (NIZORAL) 2 % cream APPLY TOPICALLY UNDER BREAST OR IN GROIN TWICE A DAY AS NEEDED FOR RASH 60 g 11    multivitamin (THERAGRAN) per tablet Take 1 tablet by mouth daily. No current facility-administered medications for this visit. Assessment:       Diagnosis Orders   1. Essential hypertension  EKG 12 lead   2. Chest pain, unspecified type     3. Mixed hyperlipidemia            Plan:      Chest pain:  Stop diltiazem night check gxt myoview. HTN; controlled. HLp:  Not controlled.  Cannot take statin may need pcsk9

## 2022-01-06 ENCOUNTER — TELEPHONE (OUTPATIENT)
Dept: CARDIOLOGY CLINIC | Age: 82
End: 2022-01-06

## 2022-01-06 NOTE — TELEPHONE ENCOUNTER
Pt was started on losartan 50 mg 12/20/21. She thinks her heart is racing now. Her HR is in the 80s and 90s. She would like call back about this.

## 2022-01-11 NOTE — TELEPHONE ENCOUNTER
Pt states that she is having edema in her lower legs during the evening which resolves overnight. He average B/P is in the 130's/ 70's and heart rate 68-78. No issues with a \"racing\" heart anymore. Pt is wondering if she should restart her Spironolactone. She states she stopped it on her own when she started the [address]   Will  address with SHAHID

## 2022-01-12 DIAGNOSIS — I10 ESSENTIAL HYPERTENSION: Primary | ICD-10-CM

## 2022-01-17 NOTE — TELEPHONE ENCOUNTER
Medication:   Requested Prescriptions      No prescriptions requested or ordered in this encounter        Last filled:        01/08/2021 as for the other cream epic will not let me      Patient Phone Number: [phone] (home)     Last appt: 11/9/2021   Next appt: 2/9/2022    Last OARRS: No flowsheet data found.

## 2022-01-17 NOTE — TELEPHONE ENCOUNTER
Medication and Quantity requested: hydrocortisone (ANUSOL-HC) 2.5 % rectal cream     ketoconazole (NIZORAL) 2 % cream        Last Visit  11/9/21    Pharmacy and phone number updated in EPIC:  Yes    [facility]

## 2022-01-18 RX ORDER — KETOCONAZOLE 20 MG/G
CREAM TOPICAL
Qty: 60 G | Refills: 11 | Status: SHIPPED | OUTPATIENT
Start: 2022-01-18 | End: 2022-04-19 | Stop reason: SDUPTHER

## 2022-01-18 RX ORDER — HYDROCORTISONE 25 MG/G
CREAM TOPICAL
Qty: 28 G | Refills: 3 | Status: SHIPPED | OUTPATIENT
Start: 2022-01-18

## 2022-02-01 ENCOUNTER — TELEPHONE (OUTPATIENT)
Dept: FAMILY MEDICINE CLINIC | Age: 82
End: 2022-02-01

## 2022-02-01 DIAGNOSIS — I10 ESSENTIAL HYPERTENSION: ICD-10-CM

## 2022-02-01 NOTE — TELEPHONE ENCOUNTER
----- Message from [place] sent at 2/1/2022  8:43 AM EST -----  Subject: Message to Provider    QUESTIONS  Information for Provider? Pt has orders for lab work and wants to come   into the office instead of going to hospital.   ---------------------------------------------------------------------------  --------------  [address]  What is the best way for the office to contact you? OK to leave message on   voicemail  Preferred Call Back Phone Number? [phone]  ---------------------------------------------------------------------------  --------------  SCRIPT ANSWERS  Relationship to Patient?  Self

## 2022-02-02 LAB
ANION GAP SERPL CALCULATED.3IONS-SCNC: 18 MMOL/L (ref 3–16)
BUN BLDV-MCNC: 12 MG/DL (ref 7–20)
CALCIUM SERPL-MCNC: 9.6 MG/DL (ref 8.3–10.6)
CHLORIDE BLD-SCNC: 106 MMOL/L (ref 99–110)
CO2: 21 MMOL/L (ref 21–32)
CREAT SERPL-MCNC: 1.1 MG/DL (ref 0.6–1.2)
GFR AFRICAN AMERICAN: 58
GFR NON-AFRICAN AMERICAN: 48
GLUCOSE BLD-MCNC: 96 MG/DL (ref 70–99)
MAGNESIUM: 2.1 MG/DL (ref 1.8–2.4)
POTASSIUM SERPL-SCNC: 4.5 MMOL/L (ref 3.5–5.1)
SODIUM BLD-SCNC: 145 MMOL/L (ref 136–145)

## 2022-04-05 ENCOUNTER — TELEPHONE (OUTPATIENT)
Dept: CARDIOLOGY CLINIC | Age: 82
End: 2022-04-05

## 2022-04-05 NOTE — TELEPHONE ENCOUNTER
Confirmed with [facility] stress test lab, pt does not have to remove mask for stress testing. Left detailed message with pt.

## 2022-04-05 NOTE — TELEPHONE ENCOUNTER
The patient called stating she would prefer to have a echo instead of a stress test, because she does not want to have to take off her mask. Please call the patient at [phone] to advise.

## 2022-04-19 ENCOUNTER — OFFICE VISIT (OUTPATIENT)
Dept: FAMILY MEDICINE CLINIC | Age: 82
End: 2022-04-19
Payer: MEDICARE

## 2022-04-19 VITALS
HEIGHT: 62 IN | HEART RATE: 77 BPM | DIASTOLIC BLOOD PRESSURE: 79 MMHG | OXYGEN SATURATION: 98 % | BODY MASS INDEX: 34.6 KG/M2 | WEIGHT: 188 LBS | SYSTOLIC BLOOD PRESSURE: 150 MMHG

## 2022-04-19 DIAGNOSIS — I10 ESSENTIAL HYPERTENSION: ICD-10-CM

## 2022-04-19 DIAGNOSIS — R73.03 PREDIABETES: ICD-10-CM

## 2022-04-19 DIAGNOSIS — J01.90 ACUTE SINUSITIS, RECURRENCE NOT SPECIFIED, UNSPECIFIED LOCATION: Primary | ICD-10-CM

## 2022-04-19 DIAGNOSIS — E78.2 MIXED HYPERLIPIDEMIA: ICD-10-CM

## 2022-04-19 DIAGNOSIS — L85.3 DRY SKIN DERMATITIS: ICD-10-CM

## 2022-04-19 DIAGNOSIS — L73.2 HIDRADENITIS SUPPURATIVA: ICD-10-CM

## 2022-04-19 DIAGNOSIS — L30.4 INTERTRIGO: ICD-10-CM

## 2022-04-19 DIAGNOSIS — J30.2 SEASONAL ALLERGIES: ICD-10-CM

## 2022-04-19 LAB
A/G RATIO: 1.3 (ref 1.1–2.2)
ALBUMIN SERPL-MCNC: 4 G/DL (ref 3.4–5)
ALP BLD-CCNC: 76 U/L (ref 40–129)
ALT SERPL-CCNC: 10 U/L (ref 10–40)
ANION GAP SERPL CALCULATED.3IONS-SCNC: 12 MMOL/L (ref 3–16)
AST SERPL-CCNC: 14 U/L (ref 15–37)
BASOPHILS ABSOLUTE: 0 K/UL (ref 0–0.2)
BASOPHILS RELATIVE PERCENT: 0.6 %
BILIRUB SERPL-MCNC: 0.5 MG/DL (ref 0–1)
BUN BLDV-MCNC: 13 MG/DL (ref 7–20)
CALCIUM SERPL-MCNC: 8.7 MG/DL (ref 8.3–10.6)
CHLORIDE BLD-SCNC: 104 MMOL/L (ref 99–110)
CHOLESTEROL, TOTAL: 250 MG/DL (ref 0–199)
CO2: 25 MMOL/L (ref 21–32)
CREAT SERPL-MCNC: 0.9 MG/DL (ref 0.6–1.2)
EOSINOPHILS ABSOLUTE: 0.4 K/UL (ref 0–0.6)
EOSINOPHILS RELATIVE PERCENT: 8.2 %
GFR AFRICAN AMERICAN: >60
GFR NON-AFRICAN AMERICAN: >60
GLUCOSE BLD-MCNC: 88 MG/DL (ref 70–99)
HCT VFR BLD CALC: 39.9 % (ref 36–48)
HDLC SERPL-MCNC: 77 MG/DL (ref 40–60)
HEMOGLOBIN: 12.9 G/DL (ref 12–16)
LDL CHOLESTEROL CALCULATED: 163 MG/DL
LYMPHOCYTES ABSOLUTE: 2.1 K/UL (ref 1–5.1)
LYMPHOCYTES RELATIVE PERCENT: 41.8 %
MCH RBC QN AUTO: 27.5 PG (ref 26–34)
MCHC RBC AUTO-ENTMCNC: 32.2 G/DL (ref 31–36)
MCV RBC AUTO: 85.4 FL (ref 80–100)
MONOCYTES ABSOLUTE: 0.3 K/UL (ref 0–1.3)
MONOCYTES RELATIVE PERCENT: 6.1 %
NEUTROPHILS ABSOLUTE: 2.2 K/UL (ref 1.7–7.7)
NEUTROPHILS RELATIVE PERCENT: 43.3 %
PDW BLD-RTO: 14.1 % (ref 12.4–15.4)
PLATELET # BLD: 276 K/UL (ref 135–450)
PMV BLD AUTO: 8.2 FL (ref 5–10.5)
POTASSIUM SERPL-SCNC: 4 MMOL/L (ref 3.5–5.1)
RBC # BLD: 4.67 M/UL (ref 4–5.2)
SODIUM BLD-SCNC: 141 MMOL/L (ref 136–145)
TOTAL PROTEIN: 7.1 G/DL (ref 6.4–8.2)
TRIGL SERPL-MCNC: 52 MG/DL (ref 0–150)
TSH REFLEX: 1.95 UIU/ML (ref 0.27–4.2)
VLDLC SERPL CALC-MCNC: 10 MG/DL
WBC # BLD: 5.1 K/UL (ref 4–11)

## 2022-04-19 PROCEDURE — 99214 OFFICE O/P EST MOD 30 MIN: CPT | Performed by: STUDENT IN AN ORGANIZED HEALTH CARE EDUCATION/TRAINING PROGRAM

## 2022-04-19 RX ORDER — CETIRIZINE HYDROCHLORIDE 5 MG/1
5 TABLET ORAL DAILY
Qty: 90 TABLET | Refills: 1 | Status: SHIPPED | OUTPATIENT
Start: 2022-04-19 | End: 2022-05-24 | Stop reason: SDUPTHER

## 2022-04-19 RX ORDER — AZITHROMYCIN 250 MG/1
TABLET, FILM COATED ORAL
Qty: 1 PACKET | Refills: 0 | Status: SHIPPED | OUTPATIENT
Start: 2022-04-19 | End: 2022-05-24

## 2022-04-19 RX ORDER — CLINDAMYCIN PHOSPHATE 11.9 MG/ML
SOLUTION TOPICAL
Qty: 30 ML | Refills: 2 | Status: SHIPPED | OUTPATIENT
Start: 2022-04-19 | End: 2022-05-19

## 2022-04-19 RX ORDER — FLUTICASONE PROPIONATE 50 MCG
2 SPRAY, SUSPENSION (ML) NASAL DAILY
Qty: 16 G | Refills: 5 | Status: SHIPPED | OUTPATIENT
Start: 2022-04-19

## 2022-04-19 RX ORDER — KETOCONAZOLE 20 MG/G
CREAM TOPICAL
Qty: 60 G | Refills: 11 | Status: SHIPPED | OUTPATIENT
Start: 2022-04-19 | End: 2022-04-27 | Stop reason: SDUPTHER

## 2022-04-19 NOTE — PROGRESS NOTES
[address] [facility] Note    Date: 2022      Assessment/Plan:     1. Acute sinusitis, recurrence not specified, unspecified location    2. Prediabetes    3. Essential hypertension    4. Mixed hyperlipidemia    5. Seasonal allergies    6. Intertrigo    7. Hidradenitis suppurativa    8. Dry skin dermatitis        Orders Placed This Encounter   Procedures    CBC with Auto Differential    Comprehensive Metabolic Panel    Lipid Panel    Hemoglobin A1C    TSH with Reflex     Orders Placed This Encounter   Medications    cetirizine (ZYRTEC) 5 MG tablet     Sig: Take 1 tablet by mouth daily     Dispense:  90 tablet     Refill:  1    clindamycin (CLEOCIN-T) 1 % external solution     Sig: Apply topically 2 times daily. To boils     Dispense:  30 mL     Refill:  2    fluticasone (FLONASE) 50 MCG/ACT nasal spray     Si sprays by Each Nostril route daily     Dispense:  16 g     Refill:  5    triamcinolone (KENALOG) 0.1 % ointment     Sig: Apply topically 2 times daily as needed, use sparingly. For itching. Dispense:  80 g     Refill:  0    azithromycin (ZITHROMAX) 250 MG tablet     Sig: Take 2 tabs (500 mg) on Day 1, and take 1 tab (250 mg) on days 2 through 5.      Dispense:  1 packet     Refill:  0    ketoconazole (NIZORAL) 2 % cream     Sig: APPLY TOPICALLY UNDER BREAST OR IN GROIN TWICE A DAY AS NEEDED FOR RASH     Dispense:  60 g     Refill:  11     Sinusitis- zpak  Dry skin dermatitis- triamcinalone sparingly for itching, cerave moisturizer cream think she has dry skin    Hidradenitis suppurativa  Topical clindamycin  Has doxycycline she will periodically take at home    Intertrigo  nizoral cream for under breasts, soap and water then keep dry and apply cream. vaseline as well    Seasonal allergies  Start Zyrtec 5 daily and flonase    Essential hypertension  Borderline high today, 904L-629G systolic at home  Monitor for now, follow up in a few months    Mixed hyperlipidemia  Been watching diet/exercise, recheck lab    If BP consistently over 823P systolic let us know  Screening labs    Return in about 2 months (around 6/19/2022). Discussed medication(s) risks, benefits, side effects, adverse reactions and interactions with patient. Patient voiced understanding. Subjective/Objective:     Chief Complaint   Patient presents with    3 Month Follow-Up       HPI  See Assessment/Plan for further HPI info    HTN: on losartan 50 mg now, stopped diltiazem b/c heart rate was toos low w/ heart doctor Dr Mary Diaz  BP been 130s-150s at home. meds are Losartan 50 daily, spironolactone MWF (pt preference)   Going to do treadmill stress test and echo -- stress test  Sx denies headache, vision changes, lightheadedness, chest pain, shortness of breath, orthopnea, PND, syncope. Discussed DASH diet, exercise, weight loss       Sinus pressure and dizziness, drainage. Doxycycline for boils occ, been using but gives her diarrhea sometimes so only took one or two the other week. Done well w/ zpak before for sinuses    Itchy too under her breasts. Wt Readings from Last 3 Encounters:   04/19/22 188 lb (85.3 kg)   12/20/21 186 lb 6.4 oz (84.6 kg)   11/09/21 185 lb (83.9 kg)     Body mass index is 34.39 kg/m². BP Readings from Last 3 Encounters:   04/19/22 (!) 150/79   12/20/21 138/64   11/09/21 119/72     The ASCVD Risk score (Shoshana Ayon., et al., 2013) failed to calculate for the following reasons:     The 2013 ASCVD risk score is only valid for ages 36 to 78    ROS: denies nausea/vomiting, fevers, chills, chest pain, shortness of breath, diarrhea, constipation, blood in the urine or stool         Patient Active Problem List   Diagnosis    Prediabetes    Vitamin D deficiency    Internal hemorrhoid, bleeding    Liver mass    Renal calculus    Asthma exacerbation    Mixed hyperlipidemia    Bilateral carpal tunnel syndrome    Essential hypertension  Pulmonary hypertension (HCC)    Moderate mitral insufficiency    Gastroesophageal reflux disease without esophagitis    Multiple food allergies    Personal history of colonic polyps    Seasonal allergies    Intertrigo    Hidradenitis suppurativa     Past Medical History:   Diagnosis Date    Allergic rhinitis     Asthma     Chronic back pain     COPD (chronic obstructive pulmonary disease) (HCC)     Dizziness     Fibromyalgia     GERD (gastroesophageal reflux disease)     Hyperlipidemia     Hypertension     Internal hemorrhoid, bleeding 12/12/2012    Hemorrhoid surgery  In 2018    Irritable bowel syndrome     LLQ pain 6/26/2020    Obesity     Osteoarthritis     Osteopenia     Rash     Renal calculus 5/6/2013    Restless legs syndrome     Sciatica 7/16/2014    Sinusitis     Tinnitus     TMJ dysfunction     Urinary incontinence        Past Surgical History:   Procedure Laterality Date    APPENDECTOMY      CHOLECYSTECTOMY      COLONOSCOPY  2011     Dr Alycia Talamantes  07/10/2018    at [facility] done by Dr. Jerri Quinonez, [id number] [facility]       Current Outpatient Medications   Medication Sig Dispense Refill    cetirizine (ZYRTEC) 5 MG tablet Take 1 tablet by mouth daily 90 tablet 1    clindamycin (CLEOCIN-T) 1 % external solution Apply topically 2 times daily. To boils 30 mL 2    fluticasone (FLONASE) 50 MCG/ACT nasal spray 2 sprays by Each Nostril route daily 16 g 5    triamcinolone (KENALOG) 0.1 % ointment Apply topically 2 times daily as needed, use sparingly. For itching.  80 g 0    azithromycin (ZITHROMAX) 250 MG tablet Take 2 tabs (500 mg) on Day 1, and take 1 tab (250 mg) on days 2 through 5. 1 packet 0    ketoconazole (NIZORAL) 2 % cream APPLY TOPICALLY UNDER BREAST OR IN GROIN TWICE A DAY AS NEEDED FOR RASH 60 g 11    hydrocortisone (ANUSOL-HC) 2.5 % CREA rectal cream USE A FINGERTIP AMOUNT RECTALLY TWICE DAILY 28 g 3    losartan (COZAAR) 50 MG tablet Take 1 tablet by mouth daily 90 tablet 3    famotidine (PEPCID) 20 MG tablet Take 1 tablet by mouth 2 times daily 180 tablet 3    Nutritional Supplements (JUICE PLUS FIBRE PO) Take by mouth      Ascorbic Acid (VITAMIN C) 250 MG tablet Take 250 mg by mouth daily      calcium carbonate (OSCAL) 500 MG TABS tablet Take 500 mg by mouth daily      Zinc 23 MG LOZG Take by mouth      Cholecalciferol (VITAMIN D3) 50 MCG ( UT) CAPS Take 1 capsule by mouth daily If not covered , will buy over the counter. 90 capsule 0    spironolactone (ALDACTONE) 25 MG tablet TAKE 1 TABLET DAILY 90 tablet 0    multivitamin (THERAGRAN) per tablet Take 1 tablet by mouth daily. No current facility-administered medications for this visit. Allergies   Allergen Reactions    Latex Itching    Crestor [Rosuvastatin Calcium] Other (See Comments)     Muscle pain    Pcn [Penicillins] Anaphylaxis    Eggs [Egg White]     Flagyl [Metronidazole] Diarrhea     Blood in stool    Lipitor      Muscle pain.  Motrin [Ibuprofen Micronized] Other (See Comments)     Head tightness    Nsaids     Sulfa Antibiotics      hypertension    Aspirin Nausea And Vomiting    Imidazole Antifungals Nausea And Vomiting and Other (See Comments)    Quinolones Nausea And Vomiting       Social History     Socioeconomic History    Marital status:       Spouse name: None    Number of children: None    Years of education: None    Highest education level: None   Occupational History    None   Tobacco Use    Smoking status: Former Smoker     Packs/day: 1.00     Years: 12.00     Pack years: 12.00     Quit date: 1972     Years since quittin.3    Smokeless tobacco: Never Used    Tobacco comment: quit when 28years old   Substance and Sexual Activity    Alcohol use: No    Drug use: No    Sexual activity: Never     Partners: Male   Other Topics Concern    None   Social History Narrative    None     Social Determinants of Health     Financial Resource Strain: Low Risk     Difficulty of Paying Living Expenses: Not hard at all   Food Insecurity: No Food Insecurity    Worried About Running Out of Food in the Last Year: Never true    Bertin of Food in the Last Year: Never true   Transportation Needs:     Lack of Transportation (Medical): Not on file    Lack of Transportation (Non-Medical):  Not on file   Physical Activity:     Days of Exercise per Week: Not on file    Minutes of Exercise per Session: Not on file   Stress:     Feeling of Stress : Not on file   Social Connections:     Frequency of Communication with Friends and Family: Not on file    Frequency of Social Gatherings with Friends and Family: Not on file    Attends Anabaptist Services: Not on file    Active Member of Clubs or Organizations: Not on file    Attends Club or Organization Meetings: Not on file    Marital Status: Not on file   Intimate Partner Violence:     Fear of Current or Ex-Partner: Not on file    Emotionally Abused: Not on file    Physically Abused: Not on file    Sexually Abused: Not on file   Housing Stability:     Unable to Pay for Housing in the Last Year: Not on file    Number of [place] in the Last Year: Not on file    Unstable Housing in the Last Year: Not on file     Family History   Problem Relation Age of Onset    Arthritis Mother     Cancer Mother         breast cancer    Diabetes Mother     Heart Disease Mother     High Blood Pressure Mother     Kidney Disease Mother     High Cholesterol Mother     Arthritis Father     Cancer Father         lung cancer    Diabetes Father     Heart Disease Father     High Blood Pressure Father     Kidney Disease Father     High Cholesterol Father     Diabetes Sister     Heart Disease Brother 58        coronary artery disease with stent    Diabetes Brother     Heart Disease Brother 65        cad         Vitals:  BP (!) 150/79 Pulse 77   Ht 5' 2\" (1.575 m)   Wt 188 lb (85.3 kg)   SpO2 98%   BMI 34.39 kg/m²     Physical Exam   General:  Well-appearing, no acute distress, alert, non-toxic  HEENT:  Normocephalic, atraumatic, without lymphadenopathy, EOMI, neck supple, oropharynx clear, MMM, nasal passages clear, TMs clear bilaterally  Cardiovascular: normal heart rate, normal rhythm, no murmurs, rubs or gallops  Respiratory: normal breath sounds, good air movement, no respiratory distress, no wheezing, rales or rhonchi  GI: bowel sounds normal, soft, non-distended, no tenderness, no masses or peritoneal signs  Extremities: intact distal pulses, warm, dry, well perfused, without clubbing, cyanosis or edema, normal movement of all extremities. No joint swelling, deformity or tenderness. Skin: small 1 cm nodule in groin that does not appear infected concerning for HS, intertrigo under breasts, warm and dry  PSYCH:  alert and oriented x 3; normal affect  NEURO:  CN2-12 grossly intact, normal motor function, normal sensory function, normal speech, no gross focal deficits noted, gait within normal    30 Total Minutes spent pre charting (reviewing problem list, meds, any test results, consultant and hospital notes, health maintenance reviewed with patient) and  obtaining present visit history, performing appropriate medical exam/evaluation, counseling and educating the patient (and family), ordering medications ,tests, and procedures as needed, refilling medication(s), placing referral(s) when needed in addition to coordinating care for this patient and documenting in electronic health record    Duke Alcantara MD    4/19/2022 10:26 AM    Documentation was done using voice recognition dragon software. Every effort was made to ensure accuracy; however, inadvertent, unintentional computerized transcription errors may be present.

## 2022-04-19 NOTE — PATIENT INSTRUCTIONS
Clindamycin in groin area for boils      nizoral / ketoconazole under breasts       Flonase, zyrtec  zpak    triamcinalone sparingly    If BP consistently over 527Q systolic let us know

## 2022-04-20 PROBLEM — R11.0 CHRONIC NAUSEA: Status: RESOLVED | Noted: 2020-06-26 | Resolved: 2022-04-20

## 2022-04-20 PROBLEM — L30.4 INTERTRIGO: Status: ACTIVE | Noted: 2022-04-20

## 2022-04-20 PROBLEM — L73.2 HIDRADENITIS SUPPURATIVA: Status: ACTIVE | Noted: 2022-04-20

## 2022-04-20 PROBLEM — J30.2 SEASONAL ALLERGIES: Status: ACTIVE | Noted: 2022-04-20

## 2022-04-20 LAB
ESTIMATED AVERAGE GLUCOSE: 119.8 MG/DL
HBA1C MFR BLD: 5.8 %

## 2022-04-26 NOTE — TELEPHONE ENCOUNTER
The patient called back stating she would like to skip the stress test all together because it increases her heart rate and makes her extremely nervous. The patient would just like to do the echo. The patient also mentioned that her PCP increased her Losartan medication to 75 mg, due to her B/P continueously being 150/70. The patient would like to know if it's ok with Dr. Bertha Rivera, because she prefers him dosing her heart medications. Please call the patient at [phone] to advise.

## 2022-04-27 RX ORDER — KETOCONAZOLE 20 MG/G
CREAM TOPICAL
Qty: 60 G | Refills: 11 | Status: SHIPPED | OUTPATIENT
Start: 2022-04-27

## 2022-04-27 NOTE — TELEPHONE ENCOUNTER
[facility] Drug Utilization clarification Request.    ketoconazole (NIZORAL) 2 % cream    Please see attached.

## 2022-04-27 NOTE — TELEPHONE ENCOUNTER
Medication:   Requested Prescriptions     Pending Prescriptions Disp Refills    ketoconazole (NIZORAL) 2 % cream 60 g 11     Sig: APPLY TOPICALLY UNDER BREAST OR IN GROIN TWICE A DAY AS NEEDED FOR RASH        Last Filled:  4/19/2022, 60g, 11    Patient Phone Number: [phone] (home)     Last appt: 4/19/2022   Next appt: 5/24/2022    Last OARRS: No flowsheet data found.

## 2022-04-29 NOTE — TELEPHONE ENCOUNTER
Please call pharmacy and give clarity. Pt may have allergy to this medication.   Please see attached    Call pharmacy back and give clarification

## 2022-05-04 ENCOUNTER — TELEPHONE (OUTPATIENT)
Dept: CARDIOLOGY CLINIC | Age: 82
End: 2022-05-04

## 2022-05-04 DIAGNOSIS — R07.9 CHEST PAIN, UNSPECIFIED TYPE: Primary | ICD-10-CM

## 2022-05-04 NOTE — TELEPHONE ENCOUNTER
Pt would like her stress test order changed to a GXT. She said she cannot tolerate caffeine and she believes she will be given caffeine during nuclear stress test. She would like call back about this.

## 2022-05-24 ENCOUNTER — OFFICE VISIT (OUTPATIENT)
Dept: FAMILY MEDICINE CLINIC | Age: 82
End: 2022-05-24
Payer: MEDICARE

## 2022-05-24 VITALS
DIASTOLIC BLOOD PRESSURE: 70 MMHG | WEIGHT: 188 LBS | HEART RATE: 78 BPM | BODY MASS INDEX: 34.39 KG/M2 | OXYGEN SATURATION: 99 % | SYSTOLIC BLOOD PRESSURE: 138 MMHG

## 2022-05-24 DIAGNOSIS — J30.2 SEASONAL ALLERGIES: ICD-10-CM

## 2022-05-24 DIAGNOSIS — I10 ESSENTIAL HYPERTENSION: Primary | ICD-10-CM

## 2022-05-24 DIAGNOSIS — Z87.898 HISTORY OF CHEST PAIN: ICD-10-CM

## 2022-05-24 DIAGNOSIS — E78.2 MIXED HYPERLIPIDEMIA: ICD-10-CM

## 2022-05-24 DIAGNOSIS — L30.4 INTERTRIGO: ICD-10-CM

## 2022-05-24 DIAGNOSIS — L73.2 HIDRADENITIS SUPPURATIVA: ICD-10-CM

## 2022-05-24 DIAGNOSIS — L85.3 DRY SKIN DERMATITIS: ICD-10-CM

## 2022-05-24 PROCEDURE — 99214 OFFICE O/P EST MOD 30 MIN: CPT | Performed by: STUDENT IN AN ORGANIZED HEALTH CARE EDUCATION/TRAINING PROGRAM

## 2022-05-24 RX ORDER — CETIRIZINE HYDROCHLORIDE 5 MG/1
5 TABLET ORAL DAILY
Qty: 90 TABLET | Refills: 1 | Status: SHIPPED | OUTPATIENT
Start: 2022-05-24

## 2022-05-24 NOTE — PATIENT INSTRUCTIONS
Sarna anti itch cream  cerave moisturizing cream    tdap and shingles vaccines at local pharmacy    [facility] 5 nightly

## 2022-05-24 NOTE — ASSESSMENT & PLAN NOTE
Trial Zyrtec 5 nightly, [facility] prescription given as patient had not tried because of cost, do not take with Benadryl.

## 2022-05-24 NOTE — ASSESSMENT & PLAN NOTE
Improved, uses Vaseline, has triamcinolone to use sparingly  Sarna anti itch cream  cerave moisturizing cream

## 2022-05-24 NOTE — PROGRESS NOTES
[address] Note    Date: 5/24/2022      Assessment/Plan:   Here for follow up BP, alllergies, skin    1. Hidradenitis suppurativa  Doing well with clindamycin topical    2. Intertrigo  Doing well with Nizoral cream    3. Seasonal allergies  Trial Zyrtec 5 nightly, GoodRx prescription given as patient had not tried because of cost, do not take with Benadryl. 4. Essential hypertension  Controlled, continue current management    5. Mixed hyperlipidemia  Diet and exercise, statin side effects in the past, SE w/ fenofibrate and zetia in the past    6. History of chest pain  Following with cardiology, upcoming treadmill stress test  Does not have any chest pain    7. Dry skin dermatitis  Improved, uses Vaseline, has triamcinolone to use sparingly  Sarna anti itch cream  cerave moisturizing cream    No orders of the defined types were placed in this encounter. Orders Placed This Encounter   Medications    cetirizine (ZYRTEC) 5 MG tablet     Sig: Take 1 tablet by mouth daily     Dispense:  90 tablet     Refill:  1     tdap shingles at [address] this 10/2022  Colonoscopy due 8/2023    Return in about 5 months (around 10/24/2022). Discussed medication(s) risks, benefits, side effects, adverse reactions and interactions with patient. Patient voiced understanding. Subjective/Objective:     Chief Complaint   Patient presents with    Blood Pressure Check       HPI  See Assessment/Plan for further HPI info  HTN follow up, doing well. Treadmill stress test coming up, just plain.  In a few days, no chest pain  Itching and boils are doing better with the creams,   zpak caused her stomach to be upset  Summer break for cello which she does over zoom, will start again in fall  Not taking zyrtec bc too expensive, uses benadryl prn and flonase  No chest pain     HTN: medications losartan 50 daily, spironolactone 25 MWF (pt preference)  113T systolic sometimes 120s/ 70s   stopped diltiazem b/c heart rate was toos low w/ heart doctor Dr Chance Kaiser  Sx denies headache, vision changes, lightheadedness, chest pain, shortness of breath, orthopnea, PND, syncope. Discussed DASH diet, exercise, weight loss     Allergies: started zyrtec 5 daily last visit not taking bc too expensive, flonase, takes benadryl once a week  HS: topical clinda, doxy takes periodically   Intertrigo: nizoral cream under breasts  Dry skin dermatitis: triamcinalone sparingly for itching, cerave moisturizer cream think she has dry skin  Hx of chest pain in past: following w/ Dr Chance Kaiser cardiology, upcoming stress test, stopped diltiazem bc heart rate too low  HLD: cholesterol high but HDL high and pt SE w/ statins in the past, not interested in other meds. Wt Readings from Last 3 Encounters:   05/24/22 188 lb (85.3 kg)   04/19/22 188 lb (85.3 kg)   12/20/21 186 lb 6.4 oz (84.6 kg)     Body mass index is 34.39 kg/m². BP Readings from Last 3 Encounters:   05/24/22 138/70   04/19/22 (!) 150/79   12/20/21 138/64     The ASCVD Risk score (Honorio Garcia., et al., 2013) failed to calculate for the following reasons:     The 2013 ASCVD risk score is only valid for ages 36 to 78    ROS: denies nausea/vomiting, fevers, chills, chest pain, shortness of breath, diarrhea, constipation, blood in the urine or stool         Patient Active Problem List   Diagnosis    Prediabetes    Vitamin D deficiency    Liver mass    Asthma exacerbation    Mixed hyperlipidemia    Essential hypertension    Pulmonary hypertension (HCC)    Moderate mitral insufficiency    Gastroesophageal reflux disease without esophagitis    Multiple food allergies    Personal history of colonic polyps    Seasonal allergies    Intertrigo    Hidradenitis suppurativa    Dry skin dermatitis     Past Medical History:   Diagnosis Date    Allergic rhinitis     Asthma     Bilateral carpal tunnel syndrome 12/21/2015    Attached Notes Procedures by Amna Benson MD at 12/17/2015 4:21 PM    Author: Amna Benson MD Service: Physical Medicine and Rehabilitation Author Type: Physician   Filed: 12/21/2015 12:22 AM Note Time: 12/17/2015 4:21 PM Note Type: Procedures   Status: Signed : Amna Benson MD (Physician)    Pre-procedure Diagnoses   1. Paresthesias [R20.2]        Post-procedure Diagnoses     Chronic back pain     COPD (chronic obstructive pulmonary disease) (HCC)     Dizziness     Fibromyalgia     GERD (gastroesophageal reflux disease)     Hyperlipidemia     Hypertension     Internal hemorrhoid, bleeding 12/12/2012    Hemorrhoid surgery  In 2018    Irritable bowel syndrome     LLQ pain 6/26/2020    Obesity     Osteoarthritis     Osteopenia     Rash     Renal calculus 5/6/2013    Restless legs syndrome     Sciatica 7/16/2014    Sinusitis     Tinnitus     TMJ dysfunction     Urinary incontinence        Past Surgical History:   Procedure Laterality Date    APPENDECTOMY      CHOLECYSTECTOMY      COLONOSCOPY  2011     Dr Jay Mcclain  07/10/2018    at [facility] by Dr. Sara Castellanos, [id number] [facility]       Current Outpatient Medications   Medication Sig Dispense Refill    cetirizine (ZYRTEC) 5 MG tablet Take 1 tablet by mouth daily 90 tablet 1    ketoconazole (NIZORAL) 2 % cream APPLY TOPICALLY UNDER BREAST OR IN GROIN TWICE A DAY AS NEEDED FOR RASH 60 g 11    fluticasone (FLONASE) 50 MCG/ACT nasal spray 2 sprays by Each Nostril route daily 16 g 5    triamcinolone (KENALOG) 0.1 % ointment Apply topically 2 times daily as needed, use sparingly. For itching.  80 g 0    hydrocortisone (ANUSOL-HC) 2.5 % CREA rectal cream USE A FINGERTIP AMOUNT RECTALLY TWICE DAILY 28 g 3    losartan (COZAAR) 50 MG tablet Take 1 tablet by mouth daily 90 tablet 3    famotidine (PEPCID) 20 MG tablet Take 1 tablet by mouth 2 times daily 180 tablet 3  Nutritional Supplements (JUICE PLUS FIBRE PO) Take by mouth      Ascorbic Acid (VITAMIN C) 250 MG tablet Take 250 mg by mouth daily      calcium carbonate (OSCAL) 500 MG TABS tablet Take 500 mg by mouth daily      Zinc 23 MG LOZG Take by mouth      Cholecalciferol (VITAMIN D3) 50 MCG ( UT) CAPS Take 1 capsule by mouth daily If not covered , will buy over the counter. 90 capsule 0    spironolactone (ALDACTONE) 25 MG tablet TAKE 1 TABLET DAILY 90 tablet 0    multivitamin (THERAGRAN) per tablet Take 1 tablet by mouth daily. No current facility-administered medications for this visit. Allergies   Allergen Reactions    Latex Itching    Crestor [Rosuvastatin Calcium] Other (See Comments)     Muscle pain    Pcn [Penicillins] Anaphylaxis    Eggs [Egg White]     Flagyl [Metronidazole] Diarrhea     Blood in stool    Lipitor      Muscle pain.  Motrin [Ibuprofen Micronized] Other (See Comments)     Head tightness    Nsaids     Sulfa Antibiotics      hypertension    Aspirin Nausea And Vomiting    Imidazole Antifungals Nausea And Vomiting and Other (See Comments)    Quinolones Nausea And Vomiting       Social History     Socioeconomic History    Marital status:       Spouse name: None    Number of children: None    Years of education: None    Highest education level: None   Occupational History    None   Tobacco Use    Smoking status: Former Smoker     Packs/day: 1.00     Years: 12.00     Pack years: 12.00     Quit date: 1972     Years since quittin.3    Smokeless tobacco: Never Used    Tobacco comment: quit when 28years old   Substance and Sexual Activity    Alcohol use: No    Drug use: No    Sexual activity: Never     Partners: Male   Other Topics Concern    None   Social History Narrative    None     Social Determinants of Health     Financial Resource Strain: Low Risk     Difficulty of Paying Living Expenses: Not hard at all   Food Insecurity: No Food Insecurity    Worried About Running Out of Food in the Last Year: Never true    [place] of Food in the Last Year: Never true   Transportation Needs:     Lack of Transportation (Medical): Not on file    Lack of Transportation (Non-Medical):  Not on file   Physical Activity:     Days of Exercise per Week: Not on file    Minutes of Exercise per Session: Not on file   Stress:     Feeling of Stress : Not on file   Social Connections:     Frequency of Communication with Friends and Family: Not on file    Frequency of Social Gatherings with Friends and Family: Not on file    Attends Oriental orthodox Services: Not on file    Active Member of Clubs or Organizations: Not on file    Attends Club or Organization Meetings: Not on file    Marital Status: Not on file   Intimate Partner Violence:     Fear of Current or Ex-Partner: Not on file    Emotionally Abused: Not on file    Physically Abused: Not on file    Sexually Abused: Not on file   Housing Stability:     Unable to Pay for Housing in the Last Year: Not on file    Number of [place] in the Last Year: Not on file    Unstable Housing in the Last Year: Not on file     Family History   Problem Relation Age of Onset    Arthritis Mother     Cancer Mother         breast cancer    Diabetes Mother     Heart Disease Mother     High Blood Pressure Mother     Kidney Disease Mother     High Cholesterol Mother     Arthritis Father     Cancer Father         lung cancer    Diabetes Father     Heart Disease Father     High Blood Pressure Father     Kidney Disease Father     High Cholesterol Father     Diabetes Sister     Heart Disease Brother 58        coronary artery disease with stent    Diabetes Brother     Heart Disease Brother 65        cad         Vitals:  /70   Pulse 78   Wt 188 lb (85.3 kg)   SpO2 99%   BMI 34.39 kg/m²     Physical Exam   General:  Well-appearing, no acute distress, alert, non-toxic  HEENT:  Normocephalic, atraumatic, without lymphadenopathy, EOMI, neck supple  Cardiovascular: normal heart rate, normal rhythm, no murmurs, rubs or gallops  Respiratory: normal breath sounds, good air movement, no respiratory distress, no wheezing, rales or rhonchi  GI: bowel sounds normal, soft, non-distended, no tenderness, no masses or peritoneal signs  Extremities: intact distal pulses, warm, dry, well perfused, without clubbing, cyanosis or edema, normal movement of all extremities. No joint swelling, deformity or tenderness. Skin:  No rash, warm and dry  PSYCH:  alert and oriented x 3; normal affect  NEURO:  cranial nerves intact/exam non focal, normal motor function, normal sensory function, normal speech, no gross focal deficits noted, gait within normal    Marylin Anthony MD    5/24/2022 1:23 PM    Documentation was done using voice recognition dragon software. Every effort was made to ensure accuracy; however, inadvertent, unintentional computerized transcription errors may be present.

## 2022-05-27 ENCOUNTER — HOSPITAL ENCOUNTER (OUTPATIENT)
Dept: NON INVASIVE DIAGNOSTICS | Age: 82
Discharge: HOME | End: 2022-05-27
Payer: MEDICARE

## 2022-05-27 DIAGNOSIS — R07.9 CHEST PAIN, UNSPECIFIED TYPE: ICD-10-CM

## 2022-05-27 PROCEDURE — 93017 CV STRESS TEST TRACING ONLY: CPT | Performed by: INTERNAL MEDICINE

## 2022-05-28 ENCOUNTER — APPOINTMENT (OUTPATIENT)
Dept: CT IMAGING | Age: 82
End: 2022-05-28
Payer: MEDICARE

## 2022-05-28 ENCOUNTER — APPOINTMENT (OUTPATIENT)
Dept: GENERAL RADIOLOGY | Age: 82
End: 2022-05-28
Payer: MEDICARE

## 2022-05-28 ENCOUNTER — HOSPITAL ENCOUNTER (EMERGENCY)
Age: 82
Discharge: HOME | End: 2022-05-28
Payer: MEDICARE

## 2022-05-28 VITALS
OXYGEN SATURATION: 98 % | RESPIRATION RATE: 16 BRPM | DIASTOLIC BLOOD PRESSURE: 77 MMHG | HEART RATE: 87 BPM | TEMPERATURE: 98.2 F | SYSTOLIC BLOOD PRESSURE: 170 MMHG

## 2022-05-28 DIAGNOSIS — M25.512 ACUTE PAIN OF LEFT SHOULDER: Primary | ICD-10-CM
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
## 2022-05-28 PROCEDURE — 99284 EMERGENCY DEPT VISIT MOD MDM: CPT

## 2022-05-28 PROCEDURE — 72125 CT NECK SPINE W/O DYE: CPT

## 2022-05-28 PROCEDURE — 70450 CT HEAD/BRAIN W/O DYE: CPT

## 2022-05-28 PROCEDURE — 73030 X-RAY EXAM OF SHOULDER: CPT

## 2022-05-28 ASSESSMENT — PAIN - FUNCTIONAL ASSESSMENT: PAIN_FUNCTIONAL_ASSESSMENT: 0-10

## 2022-05-28 ASSESSMENT — PAIN SCALES - GENERAL: PAINLEVEL_OUTOF10: 7

## 2022-05-29 NOTE — ED PROVIDER NOTES
ZARI. I have evaluated this patient. My supervising physician was available for consultation. Chief Complaint:   Chief Complaint   Patient presents with    Fall     Patient states that she tripped and fell coming into her house and caught herself with her left arm. She has left shoulder pain. Denies LOC and anticoagulation. ED Course & Medical Decision Making  [de-identified] y.o. female presenting with fall.    -L shoulder xr: no fractures  -CT C-spine/Head: no acute pathology       MDM: This 52-year-old female fell and landed on her left shoulder. She denies loss of consciousness, or blood thinners. Her work-up is negative. Her vitals are stable, and her exam is benign. I offered her over-the-counter pain medications which she declined. Elected to follow-up with her primary care provider for further evaluation next week. Final Clinical Impression & Plan:  Left shoulder pain  - OTc pain meds  - f/u with PCP next week  - ED return precautions given and reviewed, questions answered. ---------------------------------------------------------------------------------------------------------------  HPI:  PMH significant for asthma, COPD, HTN    Presenting with left shoulder pain after a mechanical fall. Patient was walking, tripped, caught herself on her left arm. She reports pain in her left arm, worse with motion/better with rest.  This pain radiates up into her neck. She does not take blood thinners, and did not hit her head in the fall. She is not tried anything to alleviate the pain in her shoulder. Is this patient to be included in the SEP-1 Core Measure due to severe sepsis or septic shock? No   Exclusion criteria - the patient is NOT to be included for SEP-1 Core Measure due to:   Infection is not suspected      Medical Hx: Past medical history reviewed, and pertinent for:     Past Medical History:   Diagnosis Date    Allergic rhinitis     Asthma     Bilateral carpal tunnel syndrome 12/21/2015    Attached Notes    Procedures by Triston Rowe MD at 12/17/2015 4:21 PM    Author: Triston Rowe MD Service: Physical Medicine and Rehabilitation Author Type: Physician   Filed: 12/21/2015 12:22 AM Note Time: 12/17/2015 4:21 PM Note Type: Procedures   Status: Signed : Triston Rowe MD (Physician)    Pre-procedure Diagnoses   1. Paresthesias [R20.2]        Post-procedure Diagnoses     Chronic back pain     COPD (chronic obstructive pulmonary disease) (HCC)     Dizziness     Fibromyalgia     GERD (gastroesophageal reflux disease)     Hyperlipidemia     Hypertension     Internal hemorrhoid, bleeding 12/12/2012    Hemorrhoid surgery  In 2018    Irritable bowel syndrome     LLQ pain 6/26/2020    Obesity     Osteoarthritis     Osteopenia     Rash     Renal calculus 5/6/2013    Restless legs syndrome     Sciatica 7/16/2014    Sinusitis     Tinnitus     TMJ dysfunction     Urinary incontinence        Patient Active Problem List   Diagnosis    Prediabetes    Vitamin D deficiency    Liver mass    Asthma exacerbation    Mixed hyperlipidemia    Essential hypertension    Pulmonary hypertension (HCC)    Moderate mitral insufficiency    Gastroesophageal reflux disease without esophagitis    Multiple food allergies    Personal history of colonic polyps    Seasonal allergies    Intertrigo    Hidradenitis suppurativa    Dry skin dermatitis         Surgical Hx:   Past surgical history reviewed, and pertinent for:      Past Surgical History:   Procedure Laterality Date    APPENDECTOMY      CHOLECYSTECTOMY      COLONOSCOPY  2011     Dr Nena Manjarrez  07/10/2018    at [facility] done by Dr. Augusto Liz, [address] [facility]       Social Hx:       Social History     Socioeconomic History    Marital status:       Spouse name: Not on file    Number of children: Not on file    Years of education: Not on file    Highest education level: Not on file   Occupational History    Not on file   Tobacco Use    Smoking status: Former Smoker     Packs/day: 1.00     Years: 12.00     Pack years: 12.00     Quit date: 1972     Years since quittin.4    Smokeless tobacco: Never Used    Tobacco comment: quit when 28years old   Substance and Sexual Activity    Alcohol use: No    Drug use: No    Sexual activity: Never     Partners: Male   Other Topics Concern    Not on file   Social History Narrative    Not on file     Social Determinants of Health     Financial Resource Strain: Low Risk     Difficulty of Paying Living Expenses: Not hard at all   Food Insecurity: No Food Insecurity    Worried About [address] in the Last Year: Never true    [employer] [employer] in the Last Year: Never true   Transportation Needs:     Lack of Transportation (Medical): Not on file    Lack of Transportation (Non-Medical):  Not on file   Physical Activity:     Days of Exercise per Week: Not on file    Minutes of Exercise per Session: Not on file   Stress:     Feeling of Stress : Not on file   Social Connections:     Frequency of Communication with Friends and Family: Not on file    Frequency of Social Gatherings with Friends and Family: Not on file    Attends Scientology Services: Not on file    Active Member of [address] [place] [address] or Organizations: Not on file    Attends Club or Organization Meetings: Not on file    Marital Status: Not on file   Intimate Partner Violence:     Fear of Current or Ex-Partner: Not on file    Emotionally Abused: Not on file    Physically Abused: Not on file    Sexually Abused: Not on file   Housing Stability:     Unable to Pay for Housing in the Last Year: Not on file    Number of [place] in the Last Year: Not on file    Unstable Housing in the Last Year: Not on file         Medications:  Discharge Medication List as of 2022  6:29 PM      CONTINUE these medications which have NOT CHANGED    Details   cetirizine (ZYRTEC) 5 MG tablet Take 1 tablet by mouth daily, Disp-90 tablet, R-1Print      ketoconazole (NIZORAL) 2 % cream APPLY TOPICALLY UNDER BREAST OR IN GROIN TWICE A DAY AS NEEDED FOR RASH, Disp-60 g, R-11, Normal      fluticasone (FLONASE) 50 MCG/ACT nasal spray 2 sprays by Each Nostril route daily, Disp-16 g, R-5Normal      triamcinolone (KENALOG) 0.1 % ointment Apply topically 2 times daily as needed, use sparingly. For itching., Disp-80 g, R-0, Normal      hydrocortisone (ANUSOL-HC) 2.5 % CREA rectal cream USE A FINGERTIP AMOUNT RECTALLY TWICE DAILY, Disp-28 g, R-3, Normal      losartan (COZAAR) 50 MG tablet Take 1 tablet by mouth daily, Disp-90 tablet, R-3Normal      famotidine (PEPCID) 20 MG tablet Take 1 tablet by mouth 2 times daily, Disp-180 tablet, R-3Normal      Nutritional Supplements (JUICE PLUS FIBRE PO) Take by mouthHistorical Med      Ascorbic Acid (VITAMIN C) 250 MG tablet Take 250 mg by mouth dailyHistorical Med      calcium carbonate (OSCAL) 500 MG TABS tablet Take 500 mg by mouth dailyHistorical Med      Zinc 23 MG LOZG Take by mouthHistorical Med      Cholecalciferol (VITAMIN D3) 50 MCG (2000 UT) CAPS Take 1 capsule by mouth daily If not covered , will buy over the counter. , Disp-90 capsule, R-0Normal      spironolactone (ALDACTONE) 25 MG tablet TAKE 1 TABLET DAILY, Disp-90 tablet, R-0Normal      multivitamin (THERAGRAN) per tablet Take 1 tablet by mouth daily. Allergies:  Latex, Crestor [rosuvastatin calcium], Pcn [penicillins], Eggs [egg white], Flagyl [metronidazole], Lipitor, Motrin [ibuprofen micronized], Nsaids, Sulfa antibiotics, Aspirin, Imidazole antifungals, and Quinolones    ROS:  10pt review of systems was performed and was negative except as noted in HPI     Imaging:  CT HEAD WO CONTRAST   Final Result   No acute intracranial abnormality. CT CERVICAL SPINE WO CONTRAST   Final Result   No acute abnormality of the cervical spine. XR SHOULDER LEFT (MIN 2 VIEWS)   Final Result   Degenerative change without acute osseous abnormality. Labs:  No results found for this visit on 05/28/22. Screenings:     Attila Coma Scale  Eye Opening: Spontaneous  Best Verbal Response: Oriented  Best Motor Response: Obeys commands  Attila Coma Scale Score: 15              Physical Exam:  Vitals: BP (!) 170/77   Pulse 87   Temp 98.2 °F (36.8 °C) (Oral)   Resp 16   SpO2 98%    General: awake, alert, no apparent distress  Pupils: equal, reactive  Eyes: EOM intact, conjunctiva clear, no discharge  Head: Non-traumatic  Neck: Supple. TTP left trapezius. Mouth: Moist, no oral lesions, no tonsillar enlargement  Heart: Rate as noted, regular rhythm, no murmur or rubs. Chest/Lungs: CTAB, no wheezes or crackles  Abdomen: soft, nondistended, no tenderness to palpation   Back: No midline tenderness. No CVA tenderness  Extremities:  2+ distal pulses bilateral UE and LE, no tenderness of calves, no edema. LUE: non tender hand, wrist, elbow. TTP anterior shoulder. SILT mru. Full fist, extend hand, cross fingers, okay sign. Neuro: Moving all extremities, no facial droop, no slurred speech, answers questions appropriately. Cranial nerves II to XII intact. SILT. 5 out of 5 strength bilateral upper and lower extremities  Skin: Warm.  No visible rash, lesions, or bruising           LUISA Kimbrough       [place], [place]  05/29/22 [id number]

## 2022-06-01 ENCOUNTER — OFFICE VISIT (OUTPATIENT)
Dept: CARDIOLOGY CLINIC | Age: 82
End: 2022-06-01
Payer: MEDICARE

## 2022-06-01 VITALS
SYSTOLIC BLOOD PRESSURE: 150 MMHG | DIASTOLIC BLOOD PRESSURE: 98 MMHG | HEIGHT: 62 IN | WEIGHT: 185 LBS | HEART RATE: 76 BPM | BODY MASS INDEX: 34.04 KG/M2

## 2022-06-01 DIAGNOSIS — I34.0 MODERATE MITRAL INSUFFICIENCY: ICD-10-CM

## 2022-06-01 DIAGNOSIS — I10 ESSENTIAL HYPERTENSION: Primary | ICD-10-CM

## 2022-06-01 DIAGNOSIS — K21.9 GASTROESOPHAGEAL REFLUX DISEASE WITHOUT ESOPHAGITIS: ICD-10-CM

## 2022-06-01 DIAGNOSIS — E04.2 MULTIPLE THYROID NODULES: ICD-10-CM

## 2022-06-01 PROCEDURE — 99214 OFFICE O/P EST MOD 30 MIN: CPT | Performed by: INTERNAL MEDICINE

## 2022-06-01 PROCEDURE — 1123F ACP DISCUSS/DSCN MKR DOCD: CPT | Performed by: INTERNAL MEDICINE

## 2022-06-01 RX ORDER — SPIRONOLACTONE 25 MG/1
TABLET ORAL
Qty: 90 TABLET | Refills: 3 | Status: SHIPPED | OUTPATIENT
Start: 2022-06-01

## 2022-06-01 RX ORDER — FAMOTIDINE 20 MG/1
20 TABLET, FILM COATED ORAL 2 TIMES DAILY
Qty: 180 TABLET | Refills: 1 | Status: SHIPPED | OUTPATIENT
Start: 2022-06-01

## 2022-06-01 RX ORDER — LOSARTAN POTASSIUM 100 MG/1
100 TABLET ORAL DAILY
Qty: 90 TABLET | Refills: 3 | Status: SHIPPED | OUTPATIENT
Start: 2022-06-01 | End: 2022-08-02 | Stop reason: ALTCHOICE

## 2022-06-01 ASSESSMENT — ENCOUNTER SYMPTOMS
WHEEZING: 0
ABDOMINAL PAIN: 0
SHORTNESS OF BREATH: 0
COUGH: 0
ALLERGIC/IMMUNOLOGIC NEGATIVE: 1
CHOKING: 0
APNEA: 0
STRIDOR: 0
CHEST TIGHTNESS: 1
GASTROINTESTINAL NEGATIVE: 1
EYES NEGATIVE: 1

## 2022-06-01 NOTE — PROGRESS NOTES
Subjective:      Patient ID: Carolina Nash is a [de-identified] y.o. female    Chief Complaint   Patient presents with    Results     stress test     Follow-up     medication      CC:  Chest pain mostly at rest.  High cholesterol. HPI:  Is here as a new patient. Patient is a former Marky Ates patient and is here to follow up after for chest pain. Echo was WNL in 2018. Left sided CP and left arm pain. Allergies   Allergen Reactions    Latex Itching    Crestor [Rosuvastatin Calcium] Other (See Comments)     Muscle pain    Pcn [Penicillins] Anaphylaxis    Eggs [Egg White]     Flagyl [Metronidazole] Diarrhea     Blood in stool    Lipitor      Muscle pain.  Motrin [Ibuprofen Micronized] Other (See Comments)     Head tightness    Nsaids     Sulfa Antibiotics      hypertension    Aspirin Nausea And Vomiting    Imidazole Antifungals Nausea And Vomiting and Other (See Comments)    Quinolones Nausea And Vomiting       Social History     Socioeconomic History    Marital status:      Spouse name: None    Number of children: None    Years of education: None    Highest education level: None   Occupational History    None   Tobacco Use    Smoking status: Former Smoker     Packs/day: 1.00     Years: 12.00     Pack years: 12.00     Quit date: 1972     Years since quittin.4    Smokeless tobacco: Never Used    Tobacco comment: quit when 28years old   Substance and Sexual Activity    Alcohol use: No    Drug use: No    Sexual activity: Never     Partners: Male   Other Topics Concern    None   Social History Narrative    None     Social Determinants of Health     Financial Resource Strain: Low Risk     Difficulty of Paying Living Expenses: Not hard at all   Food Insecurity: No Food Insecurity    Worried About Running Out of Food in the Last Year: Never true    [address] in the Last Year: Never true   Transportation Needs:     Lack of Transportation (Medical):  Not on file    Lack of Transportation (Non-Medical):  Not on file   Physical Activity:     Days of Exercise per Week: Not on file    Minutes of Exercise per Session: Not on file   Stress:     Feeling of Stress : Not on file   Social Connections:     Frequency of Communication with Friends and Family: Not on file    Frequency of Social Gatherings with Friends and Family: Not on file    Attends Sikhism Services: Not on file    Active Member of Clubs or Organizations: Not on file    Attends Club or Organization Meetings: Not on file    Marital Status: Not on file   Intimate Partner Violence:     Fear of Current or Ex-Partner: Not on file    Emotionally Abused: Not on file    Physically Abused: Not on file    Sexually Abused: Not on file   Housing Stability:     Unable to Pay for Housing in the Last Year: Not on file    Number of [place] in the Last Year: Not on file    Unstable Housing in the Last Year: Not on file       Family History   Problem Relation Age of Onset    Arthritis Mother     Cancer Mother         breast cancer    Diabetes Mother     Heart Disease Mother     High Blood Pressure Mother     Kidney Disease Mother     High Cholesterol Mother     Arthritis Father     Cancer Father         lung cancer    Diabetes Father     Heart Disease Father     High Blood Pressure Father     Kidney Disease Father     High Cholesterol Father     Diabetes Sister     Heart Disease Brother 58        coronary artery disease with stent    Diabetes Brother     Heart Disease Brother 72        cad        has a past medical history of Allergic rhinitis, Asthma, Bilateral carpal tunnel syndrome, Chronic back pain, COPD (chronic obstructive pulmonary disease) ([facility] [address] Dizziness, Fibromyalgia, GERD (gastroesophageal reflux disease), Hyperlipidemia, Hypertension, Internal hemorrhoid, bleeding, Irritable bowel syndrome, LLQ pain, Obesity, Osteoarthritis, Osteopenia, Rash, Renal calculus, Restless legs syndrome, Sciatica, Sinusitis, Tinnitus, TMJ dysfunction, and Urinary incontinence. Review of Systems   HENT: Negative. Eyes: Negative. Respiratory: Positive for chest tightness. Negative for apnea, cough, choking, shortness of breath, wheezing and stridor. Cardiovascular: Positive for chest pain. Gastrointestinal: Negative. Negative for abdominal pain. Endocrine: Negative. Skin: Negative. Allergic/Immunologic: Negative. Neurological: Negative. Hematological: Negative. Psychiatric/Behavioral: Negative. Vitals:    06/01/22 1249   BP: (!) 150/98   Pulse:         8/13/19 ECHO   Summary   Left ventricular cavity size is normal. There is mild left ventricular   hypertrophy. Overall left ventricular systolic function appears normal with   an ejection fraction of 60-65%. No regional wall motion abnormalities are   noted. Normal diastolic function. The mitral valve leaflets are slightly   thickened with normal leaflet mobility. Trivial mitral and tricuspid   regurgitation. Estimated pulmonary artery systolic pressure is at 25 mmHg   assuming a right atrial pressure of 3 mmHg. Mild pulmonic regurgitation   present. Vitals:    06/01/22 1249   BP: (!) 150/98   Pulse:        Objective:   Physical Exam  Constitutional:       Appearance: She is well-developed. HENT:      Head: Normocephalic. Eyes:      General: No scleral icterus. Cardiovascular:      Rate and Rhythm: Normal rate and regular rhythm. Heart sounds: Normal heart sounds. No murmur heard. Pulmonary:      Effort: Pulmonary effort is normal.      Breath sounds: Normal breath sounds. Abdominal:      General: Bowel sounds are normal.      Palpations: Abdomen is soft. Musculoskeletal:         General: No tenderness or deformity. Normal range of motion. Cervical back: Normal range of motion. Skin:     General: Skin is warm and dry. Capillary Refill: Capillary refill takes less than 2 seconds.       Findings: No erythema. Neurological:      Mental Status: She is oriented to person, place, and time. Cranial Nerves: No cranial nerve deficit. Psychiatric:         Behavior: Behavior normal.         Thought Content: Thought content normal.         Judgment: Judgment normal.         Current Outpatient Medications   Medication Sig Dispense Refill    cetirizine (ZYRTEC) 5 MG tablet Take 1 tablet by mouth daily 90 tablet 1    ketoconazole (NIZORAL) 2 % cream APPLY TOPICALLY UNDER BREAST OR IN GROIN TWICE A DAY AS NEEDED FOR RASH 60 g 11    fluticasone (FLONASE) 50 MCG/ACT nasal spray 2 sprays by Each Nostril route daily 16 g 5    triamcinolone (KENALOG) 0.1 % ointment Apply topically 2 times daily as needed, use sparingly. For itching. 80 g 0    hydrocortisone (ANUSOL-HC) 2.5 % CREA rectal cream USE A FINGERTIP AMOUNT RECTALLY TWICE DAILY 28 g 3    losartan (COZAAR) 50 MG tablet Take 1 tablet by mouth daily 90 tablet 3    famotidine (PEPCID) 20 MG tablet Take 1 tablet by mouth 2 times daily 180 tablet 3    Nutritional Supplements (JUICE PLUS FIBRE PO) Take by mouth      Ascorbic Acid (VITAMIN C) 250 MG tablet Take 250 mg by mouth daily      calcium carbonate (OSCAL) 500 MG TABS tablet Take 500 mg by mouth daily      Zinc 23 MG LOZG Take by mouth      Cholecalciferol (VITAMIN D3) 50 MCG (2000 UT) CAPS Take 1 capsule by mouth daily If not covered , will buy over the counter. 90 capsule 0    spironolactone (ALDACTONE) 25 MG tablet TAKE 1 TABLET DAILY 90 tablet 0    multivitamin (THERAGRAN) per tablet Take 1 tablet by mouth daily. No current facility-administered medications for this visit. Assessment:       Diagnosis Orders   1. Essential hypertension  US THYROID   2. Gastroesophageal reflux disease without esophagitis  famotidine (PEPCID) 20 MG tablet    US THYROID   3. Moderate mitral insufficiency  US THYROID   4.  Multiple thyroid nodules  US THYROID          Plan:      Chest pain:  Stop diltiazem night check gxt myoview. HTN; controlled. HLp:  Not controlled. Cannot take statin may need pcsk9. Allergic to statins. Leqvio.  ldl 165.

## 2022-06-16 ENCOUNTER — PROCEDURE VISIT (OUTPATIENT)
Dept: CARDIOLOGY CLINIC | Age: 82
End: 2022-06-16
Payer: MEDICARE

## 2022-06-16 DIAGNOSIS — I10 ESSENTIAL HYPERTENSION: Primary | ICD-10-CM

## 2022-06-16 DIAGNOSIS — I34.0 MODERATE MITRAL INSUFFICIENCY: ICD-10-CM

## 2022-06-16 LAB
A/G RATIO: 1.2 (ref 1.1–2.2)
ALBUMIN SERPL-MCNC: 4.1 G/DL (ref 3.4–5)
ALP BLD-CCNC: 105 U/L (ref 40–129)
ALT SERPL-CCNC: 10 U/L (ref 10–40)
ANION GAP SERPL CALCULATED.3IONS-SCNC: 14 MMOL/L (ref 3–16)
AST SERPL-CCNC: 15 U/L (ref 15–37)
BILIRUB SERPL-MCNC: 0.8 MG/DL (ref 0–1)
BUN BLDV-MCNC: 11 MG/DL (ref 7–20)
CALCIUM SERPL-MCNC: 10 MG/DL (ref 8.3–10.6)
CHLORIDE BLD-SCNC: 104 MMOL/L (ref 99–110)
CO2: 24 MMOL/L (ref 21–32)
CREAT SERPL-MCNC: 1.1 MG/DL (ref 0.6–1.2)
GFR AFRICAN AMERICAN: 58
GFR NON-AFRICAN AMERICAN: 48
GLUCOSE BLD-MCNC: 95 MG/DL (ref 70–99)
LV EF: 63 %
LVEF MODALITY: NORMAL
POTASSIUM SERPL-SCNC: 4.7 MMOL/L (ref 3.5–5.1)
SODIUM BLD-SCNC: 142 MMOL/L (ref 136–145)
TOTAL PROTEIN: 7.5 G/DL (ref 6.4–8.2)

## 2022-06-16 PROCEDURE — 93306 TTE W/DOPPLER COMPLETE: CPT | Performed by: INTERNAL MEDICINE

## 2022-06-18 ENCOUNTER — HOSPITAL ENCOUNTER (OUTPATIENT)
Dept: ULTRASOUND IMAGING | Age: 82
Discharge: HOME | End: 2022-06-18
Payer: MEDICARE

## 2022-06-18 DIAGNOSIS — I10 ESSENTIAL HYPERTENSION: ICD-10-CM

## 2022-06-18 DIAGNOSIS — E04.2 MULTIPLE THYROID NODULES: ICD-10-CM

## 2022-06-18 DIAGNOSIS — I34.0 MODERATE MITRAL INSUFFICIENCY: ICD-10-CM

## 2022-06-18 DIAGNOSIS — K21.9 GASTROESOPHAGEAL REFLUX DISEASE WITHOUT ESOPHAGITIS: ICD-10-CM

## 2022-06-18 PROCEDURE — 76536 US EXAM OF HEAD AND NECK: CPT

## 2022-06-22 ENCOUNTER — TELEPHONE (OUTPATIENT)
Dept: CARDIOLOGY CLINIC | Age: 82
End: 2022-06-22

## 2022-06-23 DIAGNOSIS — R93.89 ABNORMAL THYROID ULTRASOUND: Primary | ICD-10-CM

## 2022-06-23 NOTE — TELEPHONE ENCOUNTER
Spoke with pt and gave her ultrasound results.   Phone number given for Endocrinologist, she will call and schedule an appointment

## 2022-07-18 ENCOUNTER — TELEPHONE (OUTPATIENT)
Dept: FAMILY MEDICINE CLINIC | Age: 82
End: 2022-07-18

## 2022-07-18 ENCOUNTER — NURSE ONLY (OUTPATIENT)
Dept: FAMILY MEDICINE CLINIC | Age: 82
End: 2022-07-18

## 2022-07-18 DIAGNOSIS — Z20.822 EXPOSURE TO COVID-19 VIRUS: Primary | ICD-10-CM

## 2022-07-19 ENCOUNTER — TELEPHONE (OUTPATIENT)
Dept: FAMILY MEDICINE CLINIC | Age: 82
End: 2022-07-19

## 2022-07-19 LAB — SARS-COV-2: NOT DETECTED

## 2022-07-19 NOTE — TELEPHONE ENCOUNTER
The patient calls in and states she is feeling worse than she did yesterday. The patient's symptoms started 7/16/2022. The patient has an ear ache, sore throat, and is coughing up yellow sputum, the patient has cough and slight fever. The patient would like something to relieve her symptoms. The patient tested for COVID yesterday, 7/18/2022, but does not have the results back. The patient would like a prescription for Paxlovid should her COVID test come back positive.       [id number]  [address]

## 2022-07-19 NOTE — PROGRESS NOTES
Hiwot Ahumada is a 81 y.o. female. HPI:  Hiwot Ahumada, was evaluated through a synchronous (real-time) audio-video encounter. The patient (or guardian if applicable) is aware that this is a billable service, which includes applicable co-pays. This Virtual Visit was conducted with patient's (and/or legal guardian's) consent. The visit was conducted pursuant to the emergency declaration under the [address] [place] [address] authority and the [employer] Act. Patient identification was verified, and a caregiver was present when appropriate. The patient was located in a state where the provider was licensed to provide care. --Pancho Cain MD on 7/20/2022 at 3:29 PM    An electronic signature was used to authenticate this note. Video visit for cough and sore throat  COVID test 7/18/2022 was negative      Swimming last week. .. ear pain then began with URI symptoms, postnasal drip and cough  Postnasal drip is making her cough and gag  She has tried over-the-counter remedies for 1 week and is still not improved    No fever but worsening postnasal drip and cough    Many drug allergies noted       Wt Readings from Last 3 Encounters:   06/01/22 185 lb (83.9 kg)   05/24/22 188 lb (85.3 kg)   04/19/22 188 lb (85.3 kg)       REVIEW OF SYSTEMS:   CONSTITUTIONAL: See history of present illness,   Weight noted   HEENT: No new vision difficulties, congestion in her ears  RESPIRATORY: Thick postnasal drip causing cough  CARDIOVASCULAR: no CP, palpitations or SOB with exertion  GI: No nausea, vomiting, diarrhea, constipation, abdominal pain or changes in bowel habits. : No urinary frequency, urgency, incontinence hematuria or dysuria. SKIN: No cyanosis or skin lesions. MUSCULOSKELETAL: No new muscle or joint pain. NEUROLOGICAL: No syncope or TIA-like symptoms.    PSYCHIATRIC: No anxiety, insomnia or depression Historical Provider, MD   Cholecalciferol (VITAMIN D3) 50 MCG ( UT) CAPS Take 1 capsule by mouth daily If not covered , will buy over the counter. Yes Jael Matta MD   multivitamin [facility]) per tablet Take 1 tablet by mouth daily. Yes Historical Provider, MD       Past Medical History:   Diagnosis Date    Allergic rhinitis     Asthma     Bilateral carpal tunnel syndrome 2015    Attached Notes    Procedures by Kelley Leslie MD at 2015 4:21 PM    Author: Kelley Leslie MD Service: Physical Medicine and Rehabilitation Author Type: Physician   Filed: 2015 12:22 AM Note Time: 2015 4:21 PM Note Type: Procedures   Status: Signed : Kelley Leslie MD (Physician)    Pre-procedure Diagnoses   1.  Paresthesias [R20.2]        Post-procedure Diagnoses     Chronic back pain     COPD (chronic obstructive pulmonary disease) (HCC)     Dizziness     Fibromyalgia     GERD (gastroesophageal reflux disease)     Hyperlipidemia     Hypertension     Internal hemorrhoid, bleeding 2012    Hemorrhoid surgery  In 2018    Irritable bowel syndrome     LLQ pain 2020    Obesity     Osteoarthritis     Osteopenia     Rash     Renal calculus 2013    Restless legs syndrome     Sciatica 2014    Sinusitis     Tinnitus     TMJ dysfunction     Urinary incontinence        Social History     Tobacco Use    Smoking status: Former     Packs/day: 1.00     Years: 12.00     Pack years: 12.00     Types: Cigarettes     Quit date: 1972     Years since quittin.5    Smokeless tobacco: Never    Tobacco comments:     quit when 28years old   Substance Use Topics    Alcohol use: No       Family History   Problem Relation Age of Onset    Arthritis Mother     Cancer Mother         breast cancer    Diabetes Mother     Heart Disease Mother     High Blood Pressure Mother     Kidney Disease Mother     High Cholesterol Mother     Arthritis Father     Cancer Father         lung cancer Diabetes Father     Heart Disease Father     High Blood Pressure Father     Kidney Disease Father     High Cholesterol Father     Diabetes Sister     Heart Disease Brother 58        coronary artery disease with stent    Diabetes Brother     Heart Disease Brother 72        cad       OBJECTIVE:  There were no vitals taken for this visit. GEN:  alert and pleasant, gagged and coughed up thick phlegm twice on video visit  HEENT:  NCAT, EOM intact, no facial asymmetry,   NECK:  good range of motion  RR: in NAD over video, normal respiratory rate   EXT: No rash or edema observed over video  NEURO: Alert oriented to person/place/date and time , normal mood and affect, able to follow commands ,  No focal changes over video     ASSESSMENT/PLAN:  1. Protracted URI  Clindamycin twice daily with food water probiotic twice a day for 1 week  Follow-up in office if not improving    2.  PND (post-nasal drip)  Restart Flonase and Zyrtec at bedtime    Follow-up if not improving    20 Total Minutes spent pre charting (reviewing problem list, reviewing health maintenance items , meds, any test results, consultant and hospital notes ) and  obtaining present visit history, performing appropriate medical exam/evaluation, counseling and educating the patient (and family), ordering medications ,tests, and procedures as needed, refilling medication(s), placing referral(s) when needed in addition to coordinating care for this patient and documenting in electronic health record

## 2022-07-20 ENCOUNTER — TELEMEDICINE (OUTPATIENT)
Dept: FAMILY MEDICINE CLINIC | Age: 82
End: 2022-07-20
Payer: MEDICARE

## 2022-07-20 ENCOUNTER — TELEPHONE (OUTPATIENT)
Dept: FAMILY MEDICINE CLINIC | Age: 82
End: 2022-07-20

## 2022-07-20 DIAGNOSIS — R09.82 PND (POST-NASAL DRIP): ICD-10-CM

## 2022-07-20 DIAGNOSIS — J06.9 PROTRACTED URI: Primary | ICD-10-CM

## 2022-07-20 PROCEDURE — 99213 OFFICE O/P EST LOW 20 MIN: CPT | Performed by: FAMILY MEDICINE

## 2022-07-20 PROCEDURE — 1123F ACP DISCUSS/DSCN MKR DOCD: CPT | Performed by: FAMILY MEDICINE

## 2022-07-20 RX ORDER — CLINDAMYCIN HYDROCHLORIDE 300 MG/1
300 CAPSULE ORAL 2 TIMES DAILY
Qty: 14 CAPSULE | Refills: 0 | Status: SHIPPED | OUTPATIENT
Start: 2022-07-20 | End: 2022-07-27

## 2022-07-20 ASSESSMENT — PATIENT HEALTH QUESTIONNAIRE - PHQ9
SUM OF ALL RESPONSES TO PHQ QUESTIONS 1-9: 0
SUM OF ALL RESPONSES TO PHQ9 QUESTIONS 1 & 2: 0
SUM OF ALL RESPONSES TO PHQ QUESTIONS 1-9: 0
1. LITTLE INTEREST OR PLEASURE IN DOING THINGS: 0
SUM OF ALL RESPONSES TO PHQ QUESTIONS 1-9: 0
2. FEELING DOWN, DEPRESSED OR HOPELESS: 0
SUM OF ALL RESPONSES TO PHQ QUESTIONS 1-9: 0

## 2022-07-20 NOTE — TELEPHONE ENCOUNTER
Per Dr Monty Taylor, left VM to see if pt can come in for an OV instead of VV since she tested neg for Covid.

## 2022-07-21 ENCOUNTER — TELEPHONE (OUTPATIENT)
Dept: FAMILY MEDICINE CLINIC | Age: 82
End: 2022-07-21

## 2022-07-21 NOTE — TELEPHONE ENCOUNTER
The patient calls in and states the she had a virtual visit with Dr. Paula Mandujano 7/20/2022. The patient is not feeling any better and she was instructed to call office to be seen and have her chest/lung sounds checked. There are no available appointments. Please advise.     Best number to reach patient is [phone]

## 2022-07-28 ENCOUNTER — TELEPHONE (OUTPATIENT)
Dept: FAMILY MEDICINE CLINIC | Age: 82
End: 2022-07-28

## 2022-07-28 NOTE — TELEPHONE ENCOUNTER
Patient was instructed to contact Dr. Jovita Ordonez  Patient went to ED and did take antibiotic  Patient received a breathing treatment and given inhaler  Patient is still coughing up yellow phlegm, chest feels \"like its on fire\"  Patient is asking if she should be seen and receive another breathing treatment  Patient had to stop antibiotic because she had diarrhea and rash, -Clindamycin  Contact patient

## 2022-07-29 ENCOUNTER — OFFICE VISIT (OUTPATIENT)
Dept: FAMILY MEDICINE CLINIC | Age: 82
End: 2022-07-29
Payer: MEDICARE

## 2022-07-29 VITALS
BODY MASS INDEX: 34.3 KG/M2 | OXYGEN SATURATION: 97 % | DIASTOLIC BLOOD PRESSURE: 70 MMHG | HEIGHT: 62 IN | SYSTOLIC BLOOD PRESSURE: 144 MMHG | RESPIRATION RATE: 16 BRPM | HEART RATE: 82 BPM | WEIGHT: 186.4 LBS

## 2022-07-29 DIAGNOSIS — J45.41 MODERATE PERSISTENT ASTHMA WITH EXACERBATION: Primary | ICD-10-CM

## 2022-07-29 DIAGNOSIS — J20.9 BRONCHITIS WITH BRONCHOSPASM: ICD-10-CM

## 2022-07-29 PROCEDURE — 1123F ACP DISCUSS/DSCN MKR DOCD: CPT | Performed by: FAMILY MEDICINE

## 2022-07-29 PROCEDURE — 99213 OFFICE O/P EST LOW 20 MIN: CPT | Performed by: FAMILY MEDICINE

## 2022-07-29 RX ORDER — PREDNISONE 20 MG/1
20 TABLET ORAL DAILY
Qty: 10 TABLET | Refills: 0 | Status: SHIPPED | OUTPATIENT
Start: 2022-07-29 | End: 2022-08-08

## 2022-07-29 RX ORDER — BENZONATATE 100 MG/1
100 CAPSULE ORAL 3 TIMES DAILY PRN
COMMUNITY

## 2022-07-29 RX ORDER — ALBUTEROL SULFATE 90 UG/1
2 AEROSOL, METERED RESPIRATORY (INHALATION) EVERY 6 HOURS PRN
COMMUNITY
End: 2022-08-24 | Stop reason: SDUPTHER

## 2022-07-29 NOTE — PROGRESS NOTES
Zhang Jack is a 81 y.o. female. HPI:  Here for follow-up  I did a video visit with her on 7/20/2022  Treated with clindamycin 5 days but wheezing and shortness of breath got worse so she was  seen in the emergency room at [facility] because she was not improving  Given albuterol and a breathing treatment    Here for follow-up    Still with wheezy cough but no fever and phlegm is clear  Negative for COVID, flu, RSV    Meds, vitamins and allergies reviewed with pt    REVIEW OF SYSTEMS:   CONSTITUTIONAL: See history of present illness,   Weight noted   HEENT: No new vision difficulties or ringing in the ears. RESPIRATORY: No new SOB, PND, orthopnea or cough. CARDIOVASCULAR: no CP, palpitations or SOB with exertion  GI: No nausea, vomiting, diarrhea, constipation, abdominal pain or changes in bowel habits. : No urinary frequency, urgency, incontinence hematuria or dysuria. SKIN: No cyanosis or skin lesions. MUSCULOSKELETAL: No new muscle or joint pain. NEUROLOGICAL: No syncope or TIA-like symptoms. PSYCHIATRIC: No anxiety, insomnia or depression     Allergies   Allergen Reactions    Latex Itching    Crestor [Rosuvastatin Calcium] Other (See Comments)     Muscle pain    Pcn [Penicillins] Anaphylaxis    Eggs [Egg White]     Flagyl [Metronidazole] Diarrhea     Blood in stool    Lipitor      Muscle pain. Motrin [Ibuprofen Micronized] Other (See Comments)     Head tightness    Nsaids     Sulfa Antibiotics      hypertension    Aspirin Nausea And Vomiting    Imidazole Antifungals Nausea And Vomiting and Other (See Comments)    Quinolones Nausea And Vomiting       Prior to Visit Medications    Medication Sig Taking?  Authorizing Provider   albuterol sulfate HFA (VENTOLIN HFA) 108 (90 Base) MCG/ACT inhaler Inhale 2 puffs into the lungs every 6 hours as needed for Wheezing Yes Historical Provider, MD   benzonatate (TESSALON) 100 MG capsule Take 100 mg by mouth 3 times daily as needed for Cough Yes Ras Turk MD   predniSONE (DELTASONE) 20 MG tablet Take 1 tablet by mouth in the morning for 10 days. Yes Pietro Hudson MD   triamcinolone (KENALOG) 0.1 % ointment APPLY TOPICALLY TWICE A DAY AS NEEDED FOR ITCHING. USE SPARINGLY Yes Pietro Hudson MD   losartan (COZAAR) 100 MG tablet Take 1 tablet by mouth daily Yes Denise Phelan MD   spironolactone (ALDACTONE) 25 MG tablet TAKE 1 TABLET DAILY Yes Denise Phelan MD   famotidine (PEPCID) 20 MG tablet Take 1 tablet by mouth 2 times daily Yes Denise Phelan MD   cetirizine (ZYRTEC) 5 MG tablet Take 1 tablet by mouth daily Yes Charlene Clifton MD   ketoconazole (NIZORAL) 2 % cream APPLY TOPICALLY UNDER BREAST OR IN GROIN TWICE A DAY AS NEEDED FOR RASH Yes Charlene Clifton MD   fluticasone (FLONASE) 50 MCG/ACT nasal spray 2 sprays by Each Nostril route daily Yes Charlene Clifton MD   hydrocortisone (ANUSOL-HC) 2.5 % CREA rectal cream USE A FINGERTIP AMOUNT RECTALLY TWICE DAILY Yes Charlene Clifton MD   Nutritional Supplements (JUICE PLUS FIBRE PO) Take by mouth Yes Historical MD Rosalee   Ascorbic Acid (VITAMIN C) 250 MG tablet Take 250 mg by mouth daily Yes Historical MD Rosalee   calcium carbonate (OSCAL) 500 MG TABS tablet Take 500 mg by mouth daily Yes Ras Turk MD   Zinc 23 MG LOZG Take by mouth Yes Historical MD Rosalee   Cholecalciferol (VITAMIN D3) 50 MCG (2000 UT) CAPS Take 1 capsule by mouth daily If not covered , will buy over the counter. Yes Eleazar Adames MD   multivitamin [facility]) per tablet Take 1 tablet by mouth daily.    Yes Historical Provider, MD       Past Medical History:   Diagnosis Date    Allergic rhinitis     Asthma     Bilateral carpal tunnel syndrome 12/21/2015    Attached Notes    Procedures by Jeanne Carvajal MD at 12/17/2015 4:21 PM    Author: Jeanne Carvajal MD Service: Physical Medicine and Rehabilitation Author Type: Physician   Filed: 12/21/2015 12:22 AM Note Time: 2015 4:21 PM Note Type: Procedures   Status: Signed : Chase Lynn MD (Physician)    Pre-procedure Diagnoses   1. Paresthesias [R20.2]        Post-procedure Diagnoses     Chronic back pain     COPD (chronic obstructive pulmonary disease) (HCC)     Dizziness     Fibromyalgia     GERD (gastroesophageal reflux disease)     Hyperlipidemia     Hypertension     Internal hemorrhoid, bleeding 2012    Hemorrhoid surgery  In 2018    Irritable bowel syndrome     LLQ pain 2020    Obesity     Osteoarthritis     Osteopenia     Rash     Renal calculus 2013    Restless legs syndrome     Sciatica 2014    Sinusitis     Tinnitus     TMJ dysfunction     Urinary incontinence        Social History     Tobacco Use    Smoking status: Former     Packs/day: 1.00     Years: 12.00     Pack years: 12.00     Types: Cigarettes     Quit date: 1972     Years since quittin.5    Smokeless tobacco: Never    Tobacco comments:     quit when 28years old   Substance Use Topics    Alcohol use: No       Family History   Problem Relation Age of Onset    Arthritis Mother     Cancer Mother         breast cancer    Diabetes Mother     Heart Disease Mother     High Blood Pressure Mother     Kidney Disease Mother     High Cholesterol Mother     Arthritis Father     Cancer Father         lung cancer    Diabetes Father     Heart Disease Father     High Blood Pressure Father     Kidney Disease Father     High Cholesterol Father     Diabetes Sister     Heart Disease Brother 58        coronary artery disease with stent    Diabetes Brother     Heart Disease Brother 72        cad       OBJECTIVE:  BP (!) 144/70 (Site: Left Upper Arm, Position: Sitting, Cuff Size: Large Adult)   Pulse 82   Resp 16   Ht 5' 2\" (1.575 m)   Wt 186 lb 6.4 oz (84.6 kg)   SpO2 97%   BMI 34.09 kg/m²   GEN:  in NAD, some residual cough and postnasal drip  HEENT:  NCAT, TMs:normal   NECK:  Supple without adenopathy.   CV:  Regular rate and rhythm, S1 and S2 normal, no murmurs, clicks  PULM:  Chest with expiratory wheeze and cough still present  ABD: Soft, NT, no masses appreciated  EXT: No rash or edema  NEURO: Alert oriented ×3, nonfocal, no assistive device    ASSESSMENT/PLAN:  1. Moderate persistent asthma with exacerbation  Trial of Trelegy 1 puff daily 200 dose  Prednisone 20 mg daily either after breakfast or lunch  Follow-up 2 weeks or as needed    Did tolerate clindamycin for 5 days    2.   Bronchitis with bronchospasm  Trial of Trelegy and prednisone  Follow-up if not improving    25 Total Minutes spent pre charting (reviewing problem list, meds, any test results, consultant and hospital notes ) and  obtaining present visit history, performing appropriate medical exam/evaluation, counseling and educating the patient (and family), ordering medications ,tests, and procedures as needed, refilling medication(s), placing referral(s) when needed in addition to coordinating care for this patient and documenting in electronic health record

## 2022-08-02 ENCOUNTER — TELEPHONE (OUTPATIENT)
Dept: FAMILY MEDICINE CLINIC | Age: 82
End: 2022-08-02

## 2022-08-02 ENCOUNTER — OFFICE VISIT (OUTPATIENT)
Dept: FAMILY MEDICINE CLINIC | Age: 82
End: 2022-08-02
Payer: MEDICARE

## 2022-08-02 VITALS
HEART RATE: 90 BPM | DIASTOLIC BLOOD PRESSURE: 80 MMHG | HEIGHT: 62 IN | RESPIRATION RATE: 16 BRPM | WEIGHT: 186.8 LBS | BODY MASS INDEX: 34.37 KG/M2 | OXYGEN SATURATION: 98 % | SYSTOLIC BLOOD PRESSURE: 150 MMHG

## 2022-08-02 DIAGNOSIS — I10 ESSENTIAL HYPERTENSION: ICD-10-CM

## 2022-08-02 DIAGNOSIS — Z23 IMMUNIZATION DUE: ICD-10-CM

## 2022-08-02 DIAGNOSIS — J45.41 MODERATE PERSISTENT ASTHMA WITH EXACERBATION: Primary | ICD-10-CM

## 2022-08-02 PROCEDURE — 99213 OFFICE O/P EST LOW 20 MIN: CPT | Performed by: FAMILY MEDICINE

## 2022-08-02 PROCEDURE — 1123F ACP DISCUSS/DSCN MKR DOCD: CPT | Performed by: FAMILY MEDICINE

## 2022-08-02 RX ORDER — OLMESARTAN MEDOXOMIL 20 MG/1
20 TABLET ORAL 2 TIMES DAILY WITH MEALS
Qty: 30 TABLET | Refills: 0 | Status: SHIPPED | OUTPATIENT
Start: 2022-08-02 | End: 2022-08-02 | Stop reason: RX

## 2022-08-02 RX ORDER — OLMESARTAN MEDOXOMIL 20 MG/1
20 TABLET ORAL 2 TIMES DAILY WITH MEALS
Qty: 180 TABLET | Refills: 3 | Status: SHIPPED | OUTPATIENT
Start: 2022-08-02 | End: 2022-08-24

## 2022-08-02 RX ORDER — OLMESARTAN MEDOXOMIL 40 MG/1
20 TABLET ORAL 2 TIMES DAILY
Qty: 30 TABLET | Refills: 0 | Status: SHIPPED | OUTPATIENT
Start: 2022-08-02 | End: 2022-08-24

## 2022-08-02 NOTE — TELEPHONE ENCOUNTER
Medication and Quantity requested:   OLMESARTAN MEDOXOMIL 20 MG  QTY: 30.0    Last Visit  8/2/2022    Pharmacy and phone number updated in EPIC:  yes

## 2022-08-02 NOTE — PROGRESS NOTES
Loan Robles is a 81 y.o. female. HPI:  Here for recheck asthma exacerbation  Prednisone is working well, no further wheezing, questions about Trelegy    Blood Pressure still elevated    Concerned about starting Trelegy, needs reassurance, discussed with her to rinse and spit after each use. .. Try samples given and if issues to let me know    1 puff daily she could use every other day if doing well      Meds, vitamins and allergies reviewed with pt    Wt Readings from Last 3 Encounters:   08/02/22 186 lb 12.8 oz (84.7 kg)   07/29/22 186 lb 6.4 oz (84.6 kg)   06/01/22 185 lb (83.9 kg)       REVIEW OF SYSTEMS:   CONSTITUTIONAL: See history of present illness,   Weight noted   HEENT: No new vision difficulties or ringing in the ears. RESPIRATORY: No new SOB, PND, orthopnea or cough. CARDIOVASCULAR: no CP, palpitations or SOB with exertion  GI: No nausea, vomiting, diarrhea, constipation, abdominal pain or changes in bowel habits. : No urinary frequency, urgency, incontinence hematuria or dysuria. SKIN: No cyanosis or skin lesions. MUSCULOSKELETAL: No new muscle or joint pain. NEUROLOGICAL: No syncope or TIA-like symptoms. PSYCHIATRIC: No anxiety, insomnia or depression     Allergies   Allergen Reactions    Latex Itching    Crestor [Rosuvastatin Calcium] Other (See Comments)     Muscle pain    Pcn [Penicillins] Anaphylaxis    Eggs [Egg White]     Flagyl [Metronidazole] Diarrhea     Blood in stool    Lipitor      Muscle pain. Motrin [Ibuprofen Micronized] Other (See Comments)     Head tightness    Nsaids     Sulfa Antibiotics      hypertension    Aspirin Nausea And Vomiting    Imidazole Antifungals Nausea And Vomiting and Other (See Comments)    Quinolones Nausea And Vomiting       Prior to Visit Medications    Medication Sig Taking? Authorizing Provider   olmesartan (BENICAR) 20 MG tablet Take 1 tablet by mouth in the morning and 1 tablet in the evening. Take with meals.  Yes Lydia Check, MD   olmesartan (BENICAR) 20 MG tablet Take 1 tablet by mouth in the morning and 1 tablet in the evening. Take with meals. Yes Ritika Pond MD   albuterol sulfate HFA (PROVENTIL;VENTOLIN;PROAIR) 108 (90 Base) MCG/ACT inhaler Inhale 2 puffs into the lungs every 6 hours as needed for Wheezing Yes Historical Provider, MD   benzonatate (TESSALON) 100 MG capsule Take 100 mg by mouth 3 times daily as needed for Cough Yes Historical Provider, MD   predniSONE (DELTASONE) 20 MG tablet Take 1 tablet by mouth in the morning for 10 days. Yes Ritika Pond MD   triamcinolone (KENALOG) 0.1 % ointment APPLY TOPICALLY TWICE A DAY AS NEEDED FOR ITCHING. USE SPARINGLY Yes Ritika Pond MD   spironolactone (ALDACTONE) 25 MG tablet TAKE 1 TABLET DAILY Yes Stanislawestela Stack MD   famotidine (PEPCID) 20 MG tablet Take 1 tablet by mouth 2 times daily Yes Stanislaw Day, MD   cetirizine (ZYRTEC) 5 MG tablet Take 1 tablet by mouth daily Yes Wilbur Arredondo MD   ketoconazole (NIZORAL) 2 % cream APPLY TOPICALLY UNDER BREAST OR IN GROIN TWICE A DAY AS NEEDED FOR RASH Yes Wilbur Arredondo MD   fluticasone (FLONASE) 50 MCG/ACT nasal spray 2 sprays by Each Nostril route daily Yes Wilbur Arredondo MD   hydrocortisone (ANUSOL-HC) 2.5 % CREA rectal cream USE A FINGERTIP AMOUNT RECTALLY TWICE DAILY Yes Wilbur Arredondo MD   Nutritional Supplements (JUICE PLUS FIBRE PO) Take by mouth Yes Historical Provider, MD   Ascorbic Acid (VITAMIN C) 250 MG tablet Take 250 mg by mouth daily Yes Historical Provider, MD   calcium carbonate (OSCAL) 500 MG TABS tablet Take 500 mg by mouth daily Yes Historical Provider, MD   Zinc 23 MG LOZG Take by mouth Yes Historical Provider, MD   Cholecalciferol (VITAMIN D3) 50 MCG (2000 UT) CAPS Take 1 capsule by mouth daily If not covered , will buy over the counter. Yes Adrienne Styles MD   multivitamin [facility]) per tablet Take 1 tablet by mouth daily.    Yes Historical Provider, MD Past Medical History:   Diagnosis Date    Allergic rhinitis     Asthma     Bilateral carpal tunnel syndrome 2015    Attached Notes    Procedures by Lu aRman MD at 2015 4:21 PM    Author: Lu Raman MD Service: Physical Medicine and Rehabilitation Author Type: Physician   Filed: 2015 12:22 AM Note Time: 2015 4:21 PM Note Type: Procedures   Status: Signed : Lu Raman MD (Physician)    Pre-procedure Diagnoses   1.  Paresthesias [R20.2]        Post-procedure Diagnoses     Chronic back pain     COPD (chronic obstructive pulmonary disease) (HCC)     Dizziness     Fibromyalgia     GERD (gastroesophageal reflux disease)     Hyperlipidemia     Hypertension     Internal hemorrhoid, bleeding 2012    Hemorrhoid surgery  In 2018    Irritable bowel syndrome     LLQ pain 2020    Obesity     Osteoarthritis     Osteopenia     Rash     Renal calculus 2013    Restless legs syndrome     Sciatica 2014    Sinusitis     Tinnitus     TMJ dysfunction     Urinary incontinence        Social History     Tobacco Use    Smoking status: Former     Packs/day: 1.00     Years: 12.00     Pack years: 12.00     Types: Cigarettes     Quit date: 1972     Years since quittin.5    Smokeless tobacco: Never    Tobacco comments:     quit when 28years old   Substance Use Topics    Alcohol use: No       Family History   Problem Relation Age of Onset    Arthritis Mother     Cancer Mother         breast cancer    Diabetes Mother     Heart Disease Mother     High Blood Pressure Mother     Kidney Disease Mother     High Cholesterol Mother     Arthritis Father     Cancer Father         lung cancer    Diabetes Father     Heart Disease Father     High Blood Pressure Father     Kidney Disease Father     High Cholesterol Father     Diabetes Sister     Heart Disease Brother 58        coronary artery disease with stent    Diabetes Brother     Heart Disease Brother 72        cad OBJECTIVE:  BP (!) 150/80 (Site: Left Upper Arm, Position: Sitting, Cuff Size: Large Adult)   Pulse 90   Resp 16   Ht 5' 2\" (1.575 m)   Wt 186 lb 12.8 oz (84.7 kg)   SpO2 98%   BMI 34.17 kg/m²   GEN:  in NAD  HEENT:  NCAT, TMs:normal and throat: clear  NECK:  Supple without adenopathy. CV:  Regular rate and rhythm, S1 and S2 normal, no murmurs, clicks  PULM:  Chest is clear, no wheezing ,  symmetric air entry throughout both lung fields. ABD: Soft, NT  EXT: No rash or edema  NEURO: Alert oriented ×3, nonfocal     ASSESSMENT/PLAN:  1. Moderate persistent asthma with exacerbation  Finish prednisone  Trial of Trelegy consistently choose just 1 puff daily  Follow-up 1 month or as needed    2. Essential hypertension  Changed to Benicar 20 mg twice daily  Recommend she take spironolactone for 5 times a week rather than 3 times a week  Follow-up recheck blood pressure 3 to 4 weeks    3.   Immunizations due  Recommend COVID booster #2 in September with updated vaccine  Shingles and tetanus through pharmacy  Flu vaccine October 25 Total Minutes spent pre charting (reviewing problem list, meds, any test results, consultant and hospital notes ) and  obtaining present visit history, performing appropriate medical exam/evaluation, counseling and educating the patient (and family), ordering medications ,tests, and procedures as needed, refilling medication(s), placing referral(s) when needed in addition to coordinating care for this patient and documenting in electronic health record

## 2022-08-02 NOTE — TELEPHONE ENCOUNTER
Spoke with Onel at the pharmacy and he stated the only way pt insurance will cover the medication is once daily or increase the medication to 40 mg.

## 2022-08-04 ENCOUNTER — TELEPHONE (OUTPATIENT)
Dept: FAMILY MEDICINE CLINIC | Age: 82
End: 2022-08-04

## 2022-08-04 NOTE — TELEPHONE ENCOUNTER
Office has been notified that pt is requiring Prior Authorization for the following medication:  -olmesartan (BENICAR) 40 MG tablet  -     Please initiate this request through CoverMyMeds, contacting the following Payor/Insurance:  -[employer]-     Please see below, or the documentation attached to this encounter for any additional information that may assist in processing PA:  --     Thank you!

## 2022-08-05 NOTE — TELEPHONE ENCOUNTER
Submitted PA for Olmesartan Medoxomil  Via CMM Key: [id number] STATUS: \"Drug is covered by current benefit plan. No further PA activity needed. \"    If this requires a response please respond to the pool. [address]). Please advise patient thank you.

## 2022-08-24 ENCOUNTER — TELEPHONE (OUTPATIENT)
Dept: FAMILY MEDICINE CLINIC | Age: 82
End: 2022-08-24

## 2022-08-24 ENCOUNTER — OFFICE VISIT (OUTPATIENT)
Dept: FAMILY MEDICINE CLINIC | Age: 82
End: 2022-08-24
Payer: MEDICARE

## 2022-08-24 VITALS
HEART RATE: 87 BPM | WEIGHT: 189.2 LBS | DIASTOLIC BLOOD PRESSURE: 76 MMHG | OXYGEN SATURATION: 98 % | HEIGHT: 62 IN | BODY MASS INDEX: 34.82 KG/M2 | SYSTOLIC BLOOD PRESSURE: 134 MMHG | RESPIRATION RATE: 16 BRPM

## 2022-08-24 DIAGNOSIS — I27.20 PULMONARY HYPERTENSION (HCC): ICD-10-CM

## 2022-08-24 DIAGNOSIS — E04.2 MULTIPLE THYROID NODULES: ICD-10-CM

## 2022-08-24 DIAGNOSIS — J45.41 MODERATE PERSISTENT ASTHMA WITH EXACERBATION: ICD-10-CM

## 2022-08-24 DIAGNOSIS — I10 ESSENTIAL HYPERTENSION: Primary | ICD-10-CM

## 2022-08-24 PROBLEM — N18.30 CHRONIC RENAL DISEASE, STAGE III (HCC): Status: ACTIVE | Noted: 2022-08-24

## 2022-08-24 PROCEDURE — 1123F ACP DISCUSS/DSCN MKR DOCD: CPT | Performed by: FAMILY MEDICINE

## 2022-08-24 PROCEDURE — 99214 OFFICE O/P EST MOD 30 MIN: CPT | Performed by: FAMILY MEDICINE

## 2022-08-24 RX ORDER — ALBUTEROL SULFATE 90 UG/1
2 AEROSOL, METERED RESPIRATORY (INHALATION) EVERY 6 HOURS PRN
Qty: 18 G | Refills: 3 | Status: SHIPPED | OUTPATIENT
Start: 2022-08-24

## 2022-08-24 RX ORDER — OLMESARTAN MEDOXOMIL 40 MG/1
40 TABLET ORAL DAILY
Qty: 90 TABLET | Refills: 3 | Status: SHIPPED | OUTPATIENT
Start: 2022-08-24

## 2022-08-24 RX ORDER — OLMESARTAN MEDOXOMIL 40 MG/1
20 TABLET ORAL 2 TIMES DAILY
Qty: 90 TABLET | Refills: 1 | Status: SHIPPED | OUTPATIENT
Start: 2022-08-24 | End: 2022-08-31 | Stop reason: DRUGHIGH

## 2022-08-24 NOTE — TELEPHONE ENCOUNTER
Medication:   Requested Prescriptions     Pending Prescriptions Disp Refills    olmesartan (BENICAR) 40 MG tablet [Pharmacy Med Name: OLMESARTAN MEDOXOMIL 40 MG TAB] 30 tablet 0     Sig: TAKE 0.5 TABLETS BY MOUTH IN THE MORNING AND 0.5 TABLETS IN THE EVENING.        Last Filled:      Patient Phone Number: [phone] (home)     Last appt: 8/24/2022   Next appt: 11/11/2022    Lab Results   Component Value Date     06/16/2022    K 4.7 06/16/2022     06/16/2022    CO2 24 06/16/2022    BUN 11 06/16/2022    CREATININE 1.1 06/16/2022    GLUCOSE 95 06/16/2022    CALCIUM 10.0 06/16/2022    PROT 7.5 06/16/2022    LABALBU 4.1 06/16/2022    BILITOT 0.8 06/16/2022    ALKPHOS 105 06/16/2022    AST 15 06/16/2022    ALT 10 06/16/2022    LABGLOM 48 (A) 06/16/2022    GFRAA 58 (A) 06/16/2022    AGRATIO 1.2 06/16/2022    GLOB 3.5 10/08/2021

## 2022-08-24 NOTE — PROGRESS NOTES
Niranjan Olivas is a 81 y.o. female. HPI:  Here for hypertension, asthma check  Started on Trelegy and blood pressure medication has been adjusted here for recheck    Blood pressure improved, taking Benicar 20 mg twice daily(cutting the 40 mg in half)  Has not been taking spironolactone, recommend she added back on    Stopped taking Trelegy because she is feeling better recommend she take it even every other day to control her asthma and check    Does not take flu vaccines   recommend updated COVID booster when available    recommend tetanus and shingles through her pharmacy    Is going to get thyroid biopsy of nodules that were seen on chest CT May    Meds, vitamins and allergies reviewed with pt    Wt Readings from Last 3 Encounters:   08/24/22 189 lb 3.2 oz (85.8 kg)   08/02/22 186 lb 12.8 oz (84.7 kg)   07/29/22 186 lb 6.4 oz (84.6 kg)       REVIEW OF SYSTEMS:   CONSTITUTIONAL: See history of present illness,   Weight noted   HEENT: No new vision difficulties or ringing in the ears. RESPIRATORY: No new SOB, PND, orthopnea or cough. CARDIOVASCULAR: no CP, palpitations or SOB with exertion  GI: No nausea, vomiting, diarrhea, constipation, abdominal pain or changes in bowel habits. : No urinary frequency, urgency, incontinence hematuria or dysuria. SKIN: No cyanosis or skin lesions. MUSCULOSKELETAL: No new muscle or joint pain. NEUROLOGICAL: No syncope or TIA-like symptoms. PSYCHIATRIC: No anxiety, insomnia or depression     Allergies   Allergen Reactions    Latex Itching    Crestor [Rosuvastatin Calcium] Other (See Comments)     Muscle pain    Pcn [Penicillins] Anaphylaxis    Eggs [Egg White]     Flagyl [Metronidazole] Diarrhea     Blood in stool    Lipitor      Muscle pain.     Motrin [Ibuprofen Micronized] Other (See Comments)     Head tightness    Nsaids     Sulfa Antibiotics      hypertension    Aspirin Nausea And Vomiting    Imidazole Antifungals Nausea And Vomiting and Other (See Comments)    Quinolones Nausea And Vomiting       Prior to Visit Medications    Medication Sig Taking? Authorizing Provider   olmesartan (BENICAR) 40 MG tablet Take 1 tablet by mouth daily Yes Carlos Jose MD   fluticasone-umeclidin-vilant (TRELEGY ELLIPTA) 100-62.5-25 MCG/INH AEPB Inhale 1 puff into the lungs daily Yes Carlos Jose MD   albuterol sulfate HFA (PROVENTIL;VENTOLIN;PROAIR) 108 (90 Base) MCG/ACT inhaler Inhale 2 puffs into the lungs every 6 hours as needed for Wheezing Yes Carlos Jose MD   olmesartan (BENICAR) 40 MG tablet Take 0.5 tablets by mouth in the morning and 0.5 tablets in the evening. Yes Carlos Jose MD   benzonatate (TESSALON) 100 MG capsule Take 100 mg by mouth 3 times daily as needed for Cough Yes Historical Provider, MD   triamcinolone (KENALOG) 0.1 % ointment APPLY TOPICALLY TWICE A DAY AS NEEDED FOR ITCHING.  USE SPARINGLY Yes Carlos Jose MD   spironolactone (ALDACTONE) 25 MG tablet TAKE 1 TABLET DAILY Yes Apple Gaspar MD   famotidine (PEPCID) 20 MG tablet Take 1 tablet by mouth 2 times daily Yes Apple Gaspar MD   cetirizine (ZYRTEC) 5 MG tablet Take 1 tablet by mouth daily Yes Merlin Schultz MD   ketoconazole (NIZORAL) 2 % cream APPLY TOPICALLY UNDER BREAST OR IN GROIN TWICE A DAY AS NEEDED FOR RASH Yes Merlin Schultz MD   fluticasone (FLONASE) 50 MCG/ACT nasal spray 2 sprays by Each Nostril route daily Yes Merlin Schultz MD   hydrocortisone (ANUSOL-HC) 2.5 % CREA rectal cream USE A FINGERTIP AMOUNT RECTALLY TWICE DAILY Yes Merlin Schultz MD   Nutritional Supplements (JUICE PLUS FIBRE PO) Take by mouth Yes Historical Provider, MD   Ascorbic Acid (VITAMIN C) 250 MG tablet Take 250 mg by mouth daily Yes Historical Provider, MD   calcium carbonate (OSCAL) 500 MG TABS tablet Take 500 mg by mouth daily Yes Historical Provider, MD   Zinc 23 MG LOZG Take by mouth Yes Historical Provider, MD   Cholecalciferol (VITAMIN D3) 50 MCG (2000 UT) CAPS Take 1 capsule by mouth daily If not covered , will buy over the counter. Yes Clarita Coleman MD   multivitamin [facility]) per tablet Take 1 tablet by mouth daily. Yes Historical Provider, MD       Past Medical History:   Diagnosis Date    Allergic rhinitis     Asthma     Bilateral carpal tunnel syndrome 2015    Attached Notes    Procedures by Santos Potter MD at 2015 4:21 PM    Author: Santos Potter MD Service: Physical Medicine and Rehabilitation Author Type: Physician   Filed: 2015 12:22 AM Note Time: 2015 4:21 PM Note Type: Procedures   Status: Signed : Santos Potter MD (Physician)    Pre-procedure Diagnoses   1.  Paresthesias [R20.2]        Post-procedure Diagnoses     Chronic back pain     COPD (chronic obstructive pulmonary disease) (HCC)     Dizziness     Fibromyalgia     GERD (gastroesophageal reflux disease)     Hyperlipidemia     Hypertension     Internal hemorrhoid, bleeding 2012    Hemorrhoid surgery  In 2018    Irritable bowel syndrome     LLQ pain 2020    Obesity     Osteoarthritis     Osteopenia     Rash     Renal calculus 2013    Restless legs syndrome     Sciatica 2014    Sinusitis     Tinnitus     TMJ dysfunction     Urinary incontinence        Social History     Tobacco Use    Smoking status: Former     Packs/day: 1.00     Years: 12.00     Pack years: 12.00     Types: Cigarettes     Quit date: 1972     Years since quittin.6    Smokeless tobacco: Never    Tobacco comments:     quit when 28years old   Substance Use Topics    Alcohol use: No       Family History   Problem Relation Age of Onset    Arthritis Mother     Cancer Mother         breast cancer    Diabetes Mother     Heart Disease Mother     High Blood Pressure Mother     Kidney Disease Mother     High Cholesterol Mother     Arthritis Father     Cancer Father         lung cancer    Diabetes Father     Heart Disease Father     High Blood Pressure Father Kidney Disease Father     High Cholesterol Father     Diabetes Sister     Heart Disease Brother 58        coronary artery disease with stent    Diabetes Brother     Heart Disease Brother 72        cad       OBJECTIVE:  /76 (Site: Left Upper Arm, Position: Sitting, Cuff Size: Large Adult)   Pulse 87   Resp 16   Ht 5' 2\" (1.575 m)   Wt 189 lb 3.2 oz (85.8 kg)   SpO2 98%   BMI 34.61 kg/m²   GEN:  in NAD, very pleasant  HEENT:  NCAT  NECK:  Supple without adenopathy. CV:  Regular rate and rhythm, S1 and S2 normal, murmur heard mitral area  PULM:  Chest is clear, no wheezing ,  symmetric air entry throughout both lung fields. No wheezing appreciated  ABD: Soft, NT, BMI noted, no masses appreciated  EXT: No rash or edema  NEURO: Alert oriented ×3, walks with a cane    ASSESSMENT/PLAN:  1. Essential hypertension  Blood pressure improved with 20 mg twice daily of Benicar  Add on spironolactone  Follow-up with Dr. Gulshan Deng in the fall    2. Moderate persistent asthma with exacerbation  Use Trelegy even every other day to keep her asthma and check  Refill sent to pharmacy    Recommend updated COVID booster when available  Does not take a flu vaccine  Recommend tetanus and shingles through her pharmacy    3. Pulmonary hypertension ([facility] [address]  Add back on spironolactone    4.  Multiple thyroid nodules  Appointment with Dr. Silas Hatch in November for biopsy    30 Total Minutes spent pre charting (reviewing problem list, meds, any test results, consultant and hospital notes ) and  obtaining present visit history, performing appropriate medical exam/evaluation, counseling and educating the patient (and family), ordering medications ,tests, and procedures as needed, refilling medication(s), placing referral(s) when needed in addition to coordinating care for this patient and documenting in electronic health record

## 2022-08-26 ENCOUNTER — TELEPHONE (OUTPATIENT)
Dept: FAMILY MEDICINE CLINIC | Age: 82
End: 2022-08-26

## 2022-08-26 NOTE — TELEPHONE ENCOUNTER
Office has been notified that pt is requiring Prior Authorization for the following medication:    olmesartan (BENICAR) 40 MG tablet  Please initiate this request through CoverMyMeds, contacting the following Payor/Insurance:  Silvia Shah    Please see below, or the documentation attached to this encounter for any additional information that may assist in processing PA: Add dx  Thank you!

## 2022-08-30 ENCOUNTER — TELEPHONE (OUTPATIENT)
Dept: FAMILY MEDICINE CLINIC | Age: 82
End: 2022-08-30

## 2022-08-30 DIAGNOSIS — R03.0 ELEVATED BLOOD PRESSURE READING: ICD-10-CM

## 2022-08-30 DIAGNOSIS — R03.0 ELEVATED BLOOD PRESSURE READING: Primary | ICD-10-CM

## 2022-08-30 LAB
ANION GAP SERPL CALCULATED.3IONS-SCNC: 11 MMOL/L (ref 3–16)
BUN BLDV-MCNC: 11 MG/DL (ref 7–20)
CALCIUM SERPL-MCNC: 9.2 MG/DL (ref 8.3–10.6)
CHLORIDE BLD-SCNC: 104 MMOL/L (ref 99–110)
CO2: 25 MMOL/L (ref 21–32)
CREAT SERPL-MCNC: 1.2 MG/DL (ref 0.6–1.2)
GFR AFRICAN AMERICAN: 52
GFR NON-AFRICAN AMERICAN: 43
GLUCOSE BLD-MCNC: 92 MG/DL (ref 70–99)
POTASSIUM SERPL-SCNC: 4.6 MMOL/L (ref 3.5–5.1)
SODIUM BLD-SCNC: 140 MMOL/L (ref 136–145)

## 2022-08-30 NOTE — TELEPHONE ENCOUNTER
Pt wants advise on situation that happened yesterday    Pt took a water pill in the morning and BP pill in the morning 9am       2:30 checked BP:     199/105 Blood pressure yesterday with left foot severe itching under skin and right leg swelling    Pt took a water pill and was going to go to ER then did breathing exercises and then BP went down.  Pt then felt better, did not go to ER       Pt wants advice whether Carolina Rodriguez wants pt to come in and do blood work to check kidney function and glucose or what to do    Pt also has pain under left breast to back and is concerned for kidney      Please advise [phone]

## 2022-09-06 ENCOUNTER — OFFICE VISIT (OUTPATIENT)
Dept: FAMILY MEDICINE CLINIC | Age: 82
End: 2022-09-06
Payer: MEDICARE

## 2022-09-06 VITALS
DIASTOLIC BLOOD PRESSURE: 70 MMHG | SYSTOLIC BLOOD PRESSURE: 130 MMHG | HEART RATE: 89 BPM | RESPIRATION RATE: 16 BRPM | WEIGHT: 190.6 LBS | BODY MASS INDEX: 35.07 KG/M2 | HEIGHT: 62 IN | OXYGEN SATURATION: 98 %

## 2022-09-06 DIAGNOSIS — I10 ESSENTIAL HYPERTENSION: Primary | ICD-10-CM

## 2022-09-06 DIAGNOSIS — N18.30 STAGE 3 CHRONIC KIDNEY DISEASE, UNSPECIFIED WHETHER STAGE 3A OR 3B CKD (HCC): ICD-10-CM

## 2022-09-06 PROCEDURE — 1123F ACP DISCUSS/DSCN MKR DOCD: CPT | Performed by: FAMILY MEDICINE

## 2022-09-06 PROCEDURE — 99213 OFFICE O/P EST LOW 20 MIN: CPT | Performed by: FAMILY MEDICINE

## 2022-09-06 NOTE — PROGRESS NOTES
Lucrecia Ramirez is a 81 y.o. female. HPI:  For recheck blood pressure, add on Benicar taking 40 mg daily after breakfast  Blood pressure running normal at home  No dizziness or lightheadedness    Repeat labs reviewed with patient, creatinine up to 1.2  Make sure she is hydrating  Repeat BMP with annual wellness visit with Dr. Reno Vaca, vitamins and allergies reviewed with pt    Wt Readings from Last 3 Encounters:   09/06/22 190 lb 9.6 oz (86.5 kg)   08/24/22 189 lb 3.2 oz (85.8 kg)   08/02/22 186 lb 12.8 oz (84.7 kg)       REVIEW OF SYSTEMS:   CONSTITUTIONAL: See history of present illness,   Weight noted   HEENT: No new vision difficulties or ringing in the ears. RESPIRATORY: No new SOB, PND, orthopnea or cough. CARDIOVASCULAR: no CP, palpitations or SOB with exertion  GI: No nausea, vomiting, diarrhea, constipation, abdominal pain or changes in bowel habits. : No urinary frequency, urgency, incontinence hematuria or dysuria. SKIN: No cyanosis or skin lesions. MUSCULOSKELETAL: No new muscle or joint pain. NEUROLOGICAL: No syncope or TIA-like symptoms. PSYCHIATRIC: No anxiety, insomnia or depression     Allergies   Allergen Reactions    Latex Itching    Crestor [Rosuvastatin Calcium] Other (See Comments)     Muscle pain    Pcn [Penicillins] Anaphylaxis    Eggs [Egg White]     Flagyl [Metronidazole] Diarrhea     Blood in stool    Lipitor      Muscle pain. Motrin [Ibuprofen Micronized] Other (See Comments)     Head tightness    Nsaids     Sulfa Antibiotics      hypertension    Aspirin Nausea And Vomiting    Imidazole Antifungals Nausea And Vomiting and Other (See Comments)    Quinolones Nausea And Vomiting       Prior to Visit Medications    Medication Sig Taking?  Authorizing Provider   olmesartan (BENICAR) 40 MG tablet Take 1 tablet by mouth daily Yes Sriram Vazquez MD   fluticasone-umeclidin-vilant (TRELEGY ELLIPTA) 100-62.5-25 MCG/INH AEPB Inhale 1 puff into the lungs daily Yes Letitia Alvarado MD   albuterol sulfate HFA (PROVENTIL;VENTOLIN;PROAIR) 108 (90 Base) MCG/ACT inhaler Inhale 2 puffs into the lungs every 6 hours as needed for Wheezing Yes Letitia Alvarado MD   benzonatate (TESSALON) 100 MG capsule Take 100 mg by mouth 3 times daily as needed for Cough Yes Historical Provider, MD   triamcinolone (KENALOG) 0.1 % ointment APPLY TOPICALLY TWICE A DAY AS NEEDED FOR ITCHING. USE SPARINGLY Yes Letitia Alvarado MD   spironolactone (ALDACTONE) 25 MG tablet TAKE 1 TABLET DAILY Yes Carlos Chairez MD   famotidine (PEPCID) 20 MG tablet Take 1 tablet by mouth 2 times daily Yes Carlos Chairez MD   cetirizine (ZYRTEC) 5 MG tablet Take 1 tablet by mouth daily Yes Robby Ceja MD   ketoconazole (NIZORAL) 2 % cream APPLY TOPICALLY UNDER BREAST OR IN GROIN TWICE A DAY AS NEEDED FOR RASH Yes Robby Ceja MD   fluticasone (FLONASE) 50 MCG/ACT nasal spray 2 sprays by Each Nostril route daily Yes Robby Ceja MD   hydrocortisone (ANUSOL-HC) 2.5 % CREA rectal cream USE A FINGERTIP AMOUNT RECTALLY TWICE DAILY Yes Robby Ceja MD   Nutritional Supplements (JUICE PLUS FIBRE PO) Take by mouth Yes Historical Provider, MD   Ascorbic Acid (VITAMIN C) 250 MG tablet Take 250 mg by mouth daily Yes Historical Provider, MD   calcium carbonate (OSCAL) 500 MG TABS tablet Take 500 mg by mouth daily Yes Historical Provider, MD   Zinc 23 MG LOZG Take by mouth Yes Historical Provider, MD   Cholecalciferol (VITAMIN D3) 50 MCG (2000 UT) CAPS Take 1 capsule by mouth daily If not covered , will buy over the counter. Yes Clarita Coleman MD   multivitamin [facility]) per tablet Take 1 tablet by mouth daily.    Yes Historical Provider, MD       Past Medical History:   Diagnosis Date    Allergic rhinitis     Asthma     Bilateral carpal tunnel syndrome 12/21/2015    Attached Notes    Procedures by Santos Potter MD at 12/17/2015 4:21 PM    Author: Santos Potter MD Service: Physical Medicine and Rehabilitation Author Type: Physician   Filed: 2015 12:22 AM Note Time: 2015 4:21 PM Note Type: Procedures   Status: Signed : Ameya Alarcon MD (Physician)    Pre-procedure Diagnoses   1. Paresthesias [R20.2]        Post-procedure Diagnoses     Chronic back pain     COPD (chronic obstructive pulmonary disease) (HCC)     Dizziness     Fibromyalgia     GERD (gastroesophageal reflux disease)     Hyperlipidemia     Hypertension     Internal hemorrhoid, bleeding 2012    Hemorrhoid surgery  In 2018    Irritable bowel syndrome     LLQ pain 2020    Obesity     Osteoarthritis     Osteopenia     Rash     Renal calculus 2013    Restless legs syndrome     Sciatica 2014    Sinusitis     Tinnitus     TMJ dysfunction     Urinary incontinence        Social History     Tobacco Use    Smoking status: Former     Packs/day: 1.00     Years: 12.     Pack years: 12.00     Types: Cigarettes     Quit date: 1972     Years since quittin.6    Smokeless tobacco: Never    Tobacco comments:     quit when 28years old   Substance Use Topics    Alcohol use: No       Family History   Problem Relation Age of Onset    Arthritis Mother     Cancer Mother         breast cancer    Diabetes Mother     Heart Disease Mother     High Blood Pressure Mother     Kidney Disease Mother     High Cholesterol Mother     Arthritis Father     Cancer Father         lung cancer    Diabetes Father     Heart Disease Father     High Blood Pressure Father     Kidney Disease Father     High Cholesterol Father     Diabetes Sister     Heart Disease Brother 58        coronary artery disease with stent    Diabetes Brother     Heart Disease Brother 72        cad       OBJECTIVE:  /70   Pulse 89   Resp 16   Ht 5' 2\" (1.575 m)   Wt 190 lb 9.6 oz (86.5 kg)   SpO2 98%   BMI 34.86 kg/m²   GEN:  in NAD  HEENT:  NCAT  NECK:  Supple without adenopathy.   CV:  Regular rate and rhythm, S1 and S2 normal  PULM:  Chest is clear, no wheezing ,  symmetric air entry throughout both lung fields. ABD: Soft, NT, no masses appreciated  EXT: No rash or edema  NEURO: Alert oriented ×3, using walker, leg weakness    Lab Results   Component Value Date    CREATININE 1.2 08/30/2022        ASSESSMENT/PLAN:  1. Essential hypertension  Stable, continue Benicar 40 mg daily after breakfast    2.  Stage 3 chronic kidney disease, unspecified whether stage 3a or 3b CKD ([facility] [address]  Creatinine 1.2  Recheck in the future at annual wellness visit  Hydrate well    25 Total Minutes spent pre charting (reviewing problem list, meds, any test results, consultant and hospital notes ) and  obtaining present visit history, performing appropriate medical exam/evaluation, counseling and educating the patient (and family), ordering medications ,tests, and procedures as needed, refilling medication(s), placing referral(s) when needed in addition to coordinating care for this patient and documenting in electronic health record

## 2022-09-27 ENCOUNTER — HOSPITAL ENCOUNTER (OUTPATIENT)
Dept: ULTRASOUND IMAGING | Age: 82
Discharge: HOME | End: 2022-09-27
Payer: MEDICARE

## 2022-09-27 DIAGNOSIS — N18.30 STAGE 3 CHRONIC KIDNEY DISEASE, UNSPECIFIED WHETHER STAGE 3A OR 3B CKD (HCC): ICD-10-CM

## 2022-09-27 PROCEDURE — 76770 US EXAM ABDO BACK WALL COMP: CPT

## 2022-10-10 ENCOUNTER — HOSPITAL ENCOUNTER (OUTPATIENT)
Age: 82
Discharge: HOME | End: 2022-10-10
Payer: MEDICARE

## 2022-10-10 LAB
A/G RATIO: 0.9 (ref 1.1–2.2)
ALBUMIN SERPL-MCNC: 3.3 G/DL (ref 3.4–5)
ALP BLD-CCNC: 64 U/L (ref 40–129)
ALT SERPL-CCNC: 8 U/L (ref 10–40)
ANION GAP SERPL CALCULATED.3IONS-SCNC: 12 MMOL/L (ref 3–16)
AST SERPL-CCNC: 13 U/L (ref 15–37)
BACTERIA: ABNORMAL /HPF
BILIRUB SERPL-MCNC: 0.5 MG/DL (ref 0–1)
BILIRUBIN URINE: NEGATIVE
BLOOD, URINE: NEGATIVE
BUN BLDV-MCNC: 16 MG/DL (ref 7–20)
CALCIUM SERPL-MCNC: 9.2 MG/DL (ref 8.3–10.6)
CHLORIDE BLD-SCNC: 107 MMOL/L (ref 99–110)
CLARITY: CLEAR
CO2: 22 MMOL/L (ref 21–32)
COLOR: YELLOW
CREAT SERPL-MCNC: 1.1 MG/DL (ref 0.6–1.2)
EPITHELIAL CELLS, UA: 8 /HPF (ref 0–5)
GFR AFRICAN AMERICAN: 58
GFR NON-AFRICAN AMERICAN: 48
GLUCOSE BLD-MCNC: 92 MG/DL (ref 70–99)
GLUCOSE URINE: NEGATIVE MG/DL
HYALINE CASTS: 0 /LPF (ref 0–8)
KETONES, URINE: NEGATIVE MG/DL
LEUKOCYTE ESTERASE, URINE: ABNORMAL
MICROSCOPIC EXAMINATION: YES
NITRITE, URINE: NEGATIVE
PARATHYROID HORMONE INTACT: 55.4 PG/ML (ref 14–72)
PH UA: 5.5 (ref 5–8)
PHOSPHORUS: 3.2 MG/DL (ref 2.5–4.9)
POTASSIUM SERPL-SCNC: 3.9 MMOL/L (ref 3.5–5.1)
PROTEIN UA: NEGATIVE MG/DL
RBC UA: 2 /HPF (ref 0–4)
SODIUM BLD-SCNC: 141 MMOL/L (ref 136–145)
SPECIFIC GRAVITY UA: 1.01 (ref 1–1.03)
TOTAL PROTEIN: 6.9 G/DL (ref 6.4–8.2)
URIC ACID, SERUM: 5.6 MG/DL (ref 2.6–6)
URINE TYPE: ABNORMAL
UROBILINOGEN, URINE: 0.2 E.U./DL
WBC UA: 2 /HPF (ref 0–5)

## 2022-10-10 PROCEDURE — 84550 ASSAY OF BLOOD/URIC ACID: CPT

## 2022-10-10 PROCEDURE — 81001 URINALYSIS AUTO W/SCOPE: CPT

## 2022-10-10 PROCEDURE — 80053 COMPREHEN METABOLIC PANEL: CPT

## 2022-10-10 PROCEDURE — 84155 ASSAY OF PROTEIN SERUM: CPT

## 2022-10-10 PROCEDURE — 83970 ASSAY OF PARATHORMONE: CPT

## 2022-10-10 PROCEDURE — 86038 ANTINUCLEAR ANTIBODIES: CPT

## 2022-10-10 PROCEDURE — 84100 ASSAY OF PHOSPHORUS: CPT

## 2022-10-10 PROCEDURE — 84165 PROTEIN E-PHORESIS SERUM: CPT

## 2022-10-10 PROCEDURE — 86334 IMMUNOFIX E-PHORESIS SERUM: CPT

## 2022-10-11 ENCOUNTER — HOSPITAL ENCOUNTER (OUTPATIENT)
Age: 82
Discharge: HOME | End: 2022-10-11
Payer: MEDICARE

## 2022-10-11 LAB
24HR URINE VOLUME (ML): 2400 ML
ANTI-NUCLEAR ANTIBODY (ANA): NEGATIVE
PROTEIN 24 HOUR URINE: 0.1 G/24HR

## 2022-10-11 PROCEDURE — 84166 PROTEIN E-PHORESIS/URINE/CSF: CPT

## 2022-10-11 PROCEDURE — 84156 ASSAY OF PROTEIN URINE: CPT

## 2022-10-12 LAB
ALBUMIN SERPL-MCNC: 3.3 G/DL (ref 3.1–4.9)
ALPHA-1-GLOBULIN: 0.3 G/DL (ref 0.2–0.4)
ALPHA-2-GLOBULIN: 0.8 G/DL (ref 0.4–1.1)
BETA GLOBULIN: 1.3 G/DL (ref 0.9–1.6)
GAMMA GLOBULIN: 1.2 G/DL (ref 0.6–1.8)

## 2022-10-13 LAB — SPE/IFE INTERPRETATION: NORMAL

## 2022-10-14 LAB — URINE ELECTROPHORESIS INTERP: NORMAL

## 2022-11-05 ENCOUNTER — HOSPITAL ENCOUNTER (OUTPATIENT)
Dept: MRI IMAGING | Age: 82
Discharge: HOME | End: 2022-11-05
Payer: MEDICARE

## 2022-11-05 DIAGNOSIS — I10 PRIMARY HYPERTENSION: ICD-10-CM

## 2022-11-05 PROCEDURE — 6360000004 HC RX CONTRAST MEDICATION: Performed by: INTERNAL MEDICINE

## 2022-11-05 PROCEDURE — A9577 INJ MULTIHANCE: HCPCS | Performed by: INTERNAL MEDICINE

## 2022-11-05 PROCEDURE — C8902 MRA W/O FOL W/CONT, ABD: HCPCS

## 2022-11-05 RX ADMIN — GADOBENATE DIMEGLUMINE 18 ML: 529 INJECTION, SOLUTION INTRAVENOUS at 12:05

## 2022-11-05 SDOH — HEALTH STABILITY: PHYSICAL HEALTH: ON AVERAGE, HOW MANY MINUTES DO YOU ENGAGE IN EXERCISE AT THIS LEVEL?: 10 MIN

## 2022-11-05 SDOH — HEALTH STABILITY: PHYSICAL HEALTH: ON AVERAGE, HOW MANY DAYS PER WEEK DO YOU ENGAGE IN MODERATE TO STRENUOUS EXERCISE (LIKE A BRISK WALK)?: 2 DAYS

## 2022-11-05 ASSESSMENT — LIFESTYLE VARIABLES
HOW OFTEN DO YOU HAVE A DRINK CONTAINING ALCOHOL: NEVER
HOW OFTEN DO YOU HAVE A DRINK CONTAINING ALCOHOL: 1
HOW MANY STANDARD DRINKS CONTAINING ALCOHOL DO YOU HAVE ON A TYPICAL DAY: 0
HOW OFTEN DO YOU HAVE SIX OR MORE DRINKS ON ONE OCCASION: 1
HOW MANY STANDARD DRINKS CONTAINING ALCOHOL DO YOU HAVE ON A TYPICAL DAY: PATIENT DOES NOT DRINK

## 2022-11-10 ENCOUNTER — OFFICE VISIT (OUTPATIENT)
Dept: ENDOCRINOLOGY | Age: 82
End: 2022-11-10
Payer: MEDICARE

## 2022-11-10 VITALS
SYSTOLIC BLOOD PRESSURE: 144 MMHG | HEIGHT: 62 IN | RESPIRATION RATE: 16 BRPM | WEIGHT: 187 LBS | BODY MASS INDEX: 34.41 KG/M2 | DIASTOLIC BLOOD PRESSURE: 81 MMHG | HEART RATE: 82 BPM

## 2022-11-10 DIAGNOSIS — R73.03 PREDIABETES: ICD-10-CM

## 2022-11-10 DIAGNOSIS — I10 ESSENTIAL HYPERTENSION: ICD-10-CM

## 2022-11-10 DIAGNOSIS — N18.31 STAGE 3A CHRONIC KIDNEY DISEASE (HCC): ICD-10-CM

## 2022-11-10 DIAGNOSIS — E78.2 MIXED HYPERLIPIDEMIA: ICD-10-CM

## 2022-11-10 DIAGNOSIS — E04.2 MULTIPLE THYROID NODULES: Primary | ICD-10-CM

## 2022-11-10 PROCEDURE — 99203 OFFICE O/P NEW LOW 30 MIN: CPT | Performed by: INTERNAL MEDICINE

## 2022-11-10 PROCEDURE — 3078F DIAST BP <80 MM HG: CPT | Performed by: INTERNAL MEDICINE

## 2022-11-10 PROCEDURE — 3074F SYST BP LT 130 MM HG: CPT | Performed by: INTERNAL MEDICINE

## 2022-11-10 PROCEDURE — 1123F ACP DISCUSS/DSCN MKR DOCD: CPT | Performed by: INTERNAL MEDICINE

## 2022-11-10 NOTE — PROGRESS NOTES
SUBJECTIVE:  Jael Guerrero is a 81 y.o. female who is referred here for Thyroid nodules . She was diagnosed with a thyroid nodule after she underwent a Ct scan after a fall Thad Hutton had a fall with neck stiffness in May 2022. She went to [facility] and had CT head without contrast and CT cervical spine. CT cervical spine incidentally noted thyroid nodules, largest on the left side. She then had thyroid ultrasound showing 1.6cm TR4 nodule   Patient was diagnosed with Hypothyroidism approximately at age. Symptoms at presentation were   current complaints: denies fatigue, weight changes, heat/cold intolerance, bowel/skin changes or CVS symptoms  History of obstructive symptoms: difficulty swallowing No, changes in voice/hoarseness No.  Patient's daughter and mother both underwent biopsies and had normal pathology. History of radiation to patient's neck: NO  Recent iodine exposure: No  Family history includes goiter. Family history of thyroid cancer: No    She has esophageal stricture and had dilatation done in the past .  She has hypertension and is on meds and follows with cardiology . She has GERD, Fibromyalgia and CKD stage 3 follows with nephrology. Past Medical History:   Diagnosis Date    Allergic rhinitis     Asthma     Bilateral carpal tunnel syndrome 12/21/2015    Attached Notes    Procedures by Jennifer Monae MD at 12/17/2015 4:21 PM    Author: Jennifer Monae MD Service: Physical Medicine and Rehabilitation Author Type: Physician   Filed: 12/21/2015 12:22 AM Note Time: 12/17/2015 4:21 PM Note Type: Procedures   Status: Signed : Jennifer Monae MD (Physician)    Pre-procedure Diagnoses   1.  Paresthesias [R20.2]        Post-procedure Diagnoses     Chronic back pain     COPD (chronic obstructive pulmonary disease) (HCC)     Dizziness     Fibromyalgia     GERD (gastroesophageal reflux disease)     Hyperlipidemia     Hypertension     Internal hemorrhoid, bleeding 12/12/2012 Hemorrhoid surgery  In 2018    Irritable bowel syndrome     LLQ pain 6/26/2020    Obesity     Osteoarthritis     Osteopenia     Rash     Renal calculus 5/6/2013    Restless legs syndrome     Sciatica 7/16/2014    Sinusitis     Tinnitus     TMJ dysfunction     Urinary incontinence      Patient Active Problem List    Diagnosis Date Noted    Chronic renal disease, stage III ([facility] [061867] 08/24/2022    Dry skin dermatitis 05/24/2022    Seasonal allergies 04/20/2022    Intertrigo 04/20/2022    Hidradenitis suppurativa 04/20/2022    Multiple thyroid nodules 08/24/2022    Personal history of colonic polyps 09/23/2020    Gastroesophageal reflux disease without esophagitis 06/26/2020    Multiple food allergies 06/26/2020    Moderate mitral insufficiency 05/08/2018    Pulmonary hypertension ([facility] 05/24/2017    Essential hypertension 06/03/2016    Mixed hyperlipidemia 12/09/2015    Asthma exacerbation 06/04/2015    Liver mass 05/06/2013    Prediabetes 02/23/2012    Vitamin D deficiency 02/23/2012     Past Surgical History:   Procedure Laterality Date    APPENDECTOMY      CHOLECYSTECTOMY      COLONOSCOPY  2011     Dr Margarita Gastelum  07/10/2018    at [facility] done by Dr. Jodi Gutierrez (CERVIX STATUS UNKNOWN)      HYSTERECTOMY, TOTAL ABDOMINAL (CERVIX REMOVED)      1994     Family History   Problem Relation Age of Onset    Arthritis Mother     Cancer Mother         breast cancer    Diabetes Mother     Heart Disease Mother     High Blood Pressure Mother     Kidney Disease Mother     High Cholesterol Mother     Arthritis Father     Cancer Father         lung cancer    Diabetes Father     Heart Disease Father     High Blood Pressure Father     Kidney Disease Father     High Cholesterol Father     Diabetes Sister     Heart Disease Brother 58        coronary artery disease with stent    Diabetes Brother     Heart Disease Brother 72        cad     Social History     Socioeconomic History Marital status:      Spouse name: None    Number of children: None    Years of education: None    Highest education level: None   Tobacco Use    Smoking status: Former     Packs/day: 1.00     Years: 12.00     Pack years: 12.00     Types: Cigarettes     Quit date: 1972     Years since quittin.8    Smokeless tobacco: Never    Tobacco comments:     quit when 28years old   Substance and Sexual Activity    Alcohol use: No    Drug use: No    Sexual activity: Never     Partners: Male     Social Determinants of Health     Physical Activity: Insufficiently Active    Days of Exercise per Week: 2 days    Minutes of Exercise per Session: 10 min     Current Outpatient Medications   Medication Sig Dispense Refill    olmesartan (BENICAR) 40 MG tablet Take 1 tablet by mouth daily 90 tablet 3    fluticasone-umeclidin-vilant (TRELEGY ELLIPTA) 100-62.5-25 MCG/INH AEPB Inhale 1 puff into the lungs daily 3 each 5    albuterol sulfate HFA (PROVENTIL;VENTOLIN;PROAIR) 108 (90 Base) MCG/ACT inhaler Inhale 2 puffs into the lungs every 6 hours as needed for Wheezing 18 g 3    triamcinolone (KENALOG) 0.1 % ointment APPLY TOPICALLY TWICE A DAY AS NEEDED FOR ITCHING.  USE SPARINGLY 80 g 8    spironolactone (ALDACTONE) 25 MG tablet TAKE 1 TABLET DAILY (Patient taking differently: as needed TAKE 1 TABLET DAILY) 90 tablet 3    famotidine (PEPCID) 20 MG tablet Take 1 tablet by mouth 2 times daily (Patient taking differently: Take 20 mg by mouth as needed) 180 tablet 1    cetirizine (ZYRTEC) 5 MG tablet Take 1 tablet by mouth daily (Patient taking differently: Take 5 mg by mouth as needed) 90 tablet 1    ketoconazole (NIZORAL) 2 % cream APPLY TOPICALLY UNDER BREAST OR IN GROIN TWICE A DAY AS NEEDED FOR RASH 60 g 11    fluticasone (FLONASE) 50 MCG/ACT nasal spray 2 sprays by Each Nostril route daily (Patient taking differently: 2 sprays by Each Nostril route as needed) 16 g 5    hydrocortisone (ANUSOL-HC) 2.5 % CREA rectal cream USE A FINGERTIP AMOUNT RECTALLY TWICE DAILY 28 g 3    Nutritional Supplements (JUICE PLUS FIBRE PO) Take by mouth      Ascorbic Acid (VITAMIN C) 250 MG tablet Take 250 mg by mouth daily      Cholecalciferol (VITAMIN D3) 50 MCG (2000 UT) CAPS Take 1 capsule by mouth daily If not covered , will buy over the counter. (Patient taking differently: Take 1 capsule by mouth as needed If not covered , will buy over the counter.) 90 capsule 0    multivitamin (THERAGRAN) per tablet Take 1 tablet by mouth daily. No current facility-administered medications for this visit. Allergies   Allergen Reactions    Latex Itching    Crestor [Rosuvastatin Calcium] Other (See Comments)     Muscle pain    Pcn [Penicillins] Anaphylaxis    Eggs [Egg White]     Flagyl [Metronidazole] Diarrhea     Blood in stool    Lipitor      Muscle pain. Motrin [Ibuprofen Micronized] Other (See Comments)     Head tightness    Nsaids     Sulfa Antibiotics      hypertension    Aspirin Nausea And Vomiting    Imidazole Antifungals Nausea And Vomiting and Other (See Comments)    Quinolones Nausea And Vomiting         Review of Systems:  I have reviewed the review of system questionnaire filled by the patient .   Patient was advised to contact PCP for non endocrine signs and symptoms       OBJECTIVE:   BP (!) 144/81   Pulse 82   Resp 16   Ht 5' 2\" (1.575 m)   Wt 187 lb (84.8 kg)   BMI 34.20 kg/m²   Wt Readings from Last 3 Encounters:   11/10/22 187 lb (84.8 kg)   09/06/22 190 lb 9.6 oz (86.5 kg)   08/24/22 189 lb 3.2 oz (85.8 kg)       Physical Exam:  Constitutional: no acute distress, well appearing, well nourished  Psychiatric: oriented to person, place and time, judgement, insight and normal, recent and remote memory and intact and mood, affect are normal  Skin: skin and subcutaneous tissue is normal without mass, normal turgor  Head and Face: examination of head and face revealed no abnormalities  Eyes: no lid or conjunctival swelling, no erythema or discharge, pupils are normal, equal, round. Ears/Nose: external inspection of ears and nose revealed no abnormalities, hearing is grossly normal  Oropharynx/Mouth/Face: lips, tongue and gums are normal with no lesions, the voice quality was normal  Neck: neck is supple and symmetric, with midline trachea and no masses, thyroid is enlarged  Lymphatics: normal cervical lymph nodes, normal supraclavicular nodes  Pulmonary: no increased work of breathing or signs of respiratory distress, lungs are clear to auscultation  Cardiovascular: normal heart rate and rhythm, normal S1 and S2, no murmurs   Abdomen: abdomen is soft, non-tender with no masses  Musculoskeletal: normal gait and station. Neurological: normal coordination, normal general cortical function    Lab Review:  Lab Results   Component Value Date/Time    TSH 1.78 08/13/2015 01:03 PM    TSH 1.45 01/13/2012 11:52 AM     No results found for: T4FREE     ASSESSMENT/PLAN:  1. Multiple thyroid nodules  With 1.6 cm dominant thyroid nodule in the left lobe identified. Patient denies any family history of thyroid cancer, denies any radiation exposure. Baseline thyroid ultrasound done in June 2022, this was an incidental finding after CT scan of head after a fall. Previously patient's daughter and her mother both have undergone fine-needle aspiration biopsy with benign results. Patient has been advised by her previous endocrinologist to get a fine-needle aspiration biopsy which she never did  She will repeat a thyroid ultrasound in 2 to 3 months and will consider a biopsy if there is any increase in the size of the nodule. TSH normal in April 2022          - TSH; Future  - T4, Free; Future  - US HEAD NECK SOFT TISSUE THYROID; Future    2. Stage 3a chronic kidney disease ([address]  Follows with nephrology     3. Prediabetes  Stable follows with primary care physician    4. Mixed hyperlipidemia  On statins stable continue with the current therapy       5.  Essential hypertension  blood pressure control is adequate at home, denies any chest pain shortness of breath or headache    Reviewed and/or ordered clinical lab results Yes  Reviewed and/or ordered radiology tests Yes   Reviewed and/or ordered other diagnostic tests Yes  Made a decision to obtain old records Yes  Reviewed and summarized old records Yes      Prince Harden was counseled regarding symptoms of current diagnosis, course and complications of disease if inadequately treated, side effects of medications, diagnosis, treatment options, and prognosis, risks, benefits, complications, and alternatives of treatment, labs, imaging and other studies and treatment targets and goals. She understands instructions and counseling. Return in about 3 months (around 2/10/2023). Please note that some or all of this report was generated using voice recognition software. Please notify me in case of any questions about the content of this document, as some errors in transcription may have occurred .

## 2022-11-11 ENCOUNTER — OFFICE VISIT (OUTPATIENT)
Dept: FAMILY MEDICINE CLINIC | Age: 82
End: 2022-11-11
Payer: MEDICARE

## 2022-11-11 VITALS
SYSTOLIC BLOOD PRESSURE: 123 MMHG | BODY MASS INDEX: 34.2 KG/M2 | WEIGHT: 187 LBS | HEART RATE: 78 BPM | OXYGEN SATURATION: 98 % | DIASTOLIC BLOOD PRESSURE: 65 MMHG

## 2022-11-11 DIAGNOSIS — E78.2 MIXED HYPERLIPIDEMIA: ICD-10-CM

## 2022-11-11 DIAGNOSIS — M25.561 CHRONIC PAIN OF BOTH KNEES: ICD-10-CM

## 2022-11-11 DIAGNOSIS — Z00.00 MEDICARE ANNUAL WELLNESS VISIT, SUBSEQUENT: Primary | ICD-10-CM

## 2022-11-11 DIAGNOSIS — N18.31 STAGE 3A CHRONIC KIDNEY DISEASE (HCC): ICD-10-CM

## 2022-11-11 DIAGNOSIS — I10 ESSENTIAL HYPERTENSION: ICD-10-CM

## 2022-11-11 DIAGNOSIS — E04.2 MULTIPLE THYROID NODULES: ICD-10-CM

## 2022-11-11 DIAGNOSIS — M25.562 CHRONIC PAIN OF BOTH KNEES: ICD-10-CM

## 2022-11-11 DIAGNOSIS — G89.29 CHRONIC PAIN OF BOTH KNEES: ICD-10-CM

## 2022-11-11 LAB
CHOLESTEROL, TOTAL: 277 MG/DL (ref 0–199)
HDLC SERPL-MCNC: 78 MG/DL (ref 40–60)
LDL CHOLESTEROL CALCULATED: 188 MG/DL
TRIGL SERPL-MCNC: 54 MG/DL (ref 0–150)
VLDLC SERPL CALC-MCNC: 11 MG/DL

## 2022-11-11 PROCEDURE — 1123F ACP DISCUSS/DSCN MKR DOCD: CPT | Performed by: STUDENT IN AN ORGANIZED HEALTH CARE EDUCATION/TRAINING PROGRAM

## 2022-11-11 PROCEDURE — 3074F SYST BP LT 130 MM HG: CPT | Performed by: STUDENT IN AN ORGANIZED HEALTH CARE EDUCATION/TRAINING PROGRAM

## 2022-11-11 PROCEDURE — 3078F DIAST BP <80 MM HG: CPT | Performed by: STUDENT IN AN ORGANIZED HEALTH CARE EDUCATION/TRAINING PROGRAM

## 2022-11-11 PROCEDURE — G0439 PPPS, SUBSEQ VISIT: HCPCS | Performed by: STUDENT IN AN ORGANIZED HEALTH CARE EDUCATION/TRAINING PROGRAM

## 2022-11-11 SDOH — ECONOMIC STABILITY: FOOD INSECURITY: WITHIN THE PAST 12 MONTHS, THE FOOD YOU BOUGHT JUST DIDN'T LAST AND YOU DIDN'T HAVE MONEY TO GET MORE.: NEVER TRUE

## 2022-11-11 SDOH — ECONOMIC STABILITY: TRANSPORTATION INSECURITY
IN THE PAST 12 MONTHS, HAS LACK OF TRANSPORTATION KEPT YOU FROM MEETINGS, WORK, OR FROM GETTING THINGS NEEDED FOR DAILY LIVING?: NO

## 2022-11-11 SDOH — ECONOMIC STABILITY: TRANSPORTATION INSECURITY
IN THE PAST 12 MONTHS, HAS THE LACK OF TRANSPORTATION KEPT YOU FROM MEDICAL APPOINTMENTS OR FROM GETTING MEDICATIONS?: NO

## 2022-11-11 SDOH — ECONOMIC STABILITY: HOUSING INSECURITY
IN THE LAST 12 MONTHS, WAS THERE A TIME WHEN YOU DID NOT HAVE A STEADY PLACE TO SLEEP OR SLEPT IN A SHELTER (INCLUDING NOW)?: NO

## 2022-11-11 SDOH — ECONOMIC STABILITY: INCOME INSECURITY: IN THE LAST 12 MONTHS, WAS THERE A TIME WHEN YOU WERE NOT ABLE TO PAY THE MORTGAGE OR RENT ON TIME?: NO

## 2022-11-11 SDOH — ECONOMIC STABILITY: FOOD INSECURITY: WITHIN THE PAST 12 MONTHS, YOU WORRIED THAT YOUR FOOD WOULD RUN OUT BEFORE YOU GOT MONEY TO BUY MORE.: NEVER TRUE

## 2022-11-11 ASSESSMENT — SOCIAL DETERMINANTS OF HEALTH (SDOH): HOW HARD IS IT FOR YOU TO PAY FOR THE VERY BASICS LIKE FOOD, HOUSING, MEDICAL CARE, AND HEATING?: NOT HARD AT ALL

## 2022-11-11 NOTE — PATIENT INSTRUCTIONS
Personalized Preventive Plan for Jose Hernandez - 11/11/2022  Medicare offers a range of preventive health benefits. Some of the tests and screenings are paid in full while other may be subject to a deductible, co-insurance, and/or copay. Some of these benefits include a comprehensive review of your medical history including lifestyle, illnesses that may run in your family, and various assessments and screenings as appropriate. After reviewing your medical record and screening and assessments performed today your provider may have ordered immunizations, labs, imaging, and/or referrals for you. A list of these orders (if applicable) as well as your Preventive Care list are included within your After Visit Summary for your review. Other Preventive Recommendations:    A preventive eye exam performed by an eye specialist is recommended every 1-2 years to screen for glaucoma; cataracts, macular degeneration, and other eye disorders. A preventive dental visit is recommended every 6 months. Try to get at least 150 minutes of exercise per week or 10,000 steps per day on a pedometer . Order or download the FREE \"Exercise & Physical Activity: Your Everyday Guide\" from [employer]. Call 0-954.525.2313 or search [employer] online. You need 3273-3821 mg of calcium and 8856-7909 IU of vitamin D per day. It is possible to meet your calcium requirement with diet alone, but a vitamin D supplement is usually necessary to meet this goal.  When exposed to the sun, use a sunscreen that protects against both UVA and UVB radiation with an SPF of 30 or greater. Reapply every 2 to 3 hours or after sweating, drying off with a towel, or swimming. Always wear a seat belt when traveling in a car. Always wear a helmet when riding a bicycle or motorcycle.

## 2022-11-11 NOTE — PROGRESS NOTES
Medicare Annual Wellness Visit    Adriana Singh is here for No chief complaint on file. Assessment & Plan   Medicare annual wellness visit, subsequent  Chronic pain of both knees- arthritis, refer to ortho for possible knee injections  -     Raghav Nathan MD, Orthopedic Surgery (Hip; Knee; Shoulder), [facility]  Mixed hyperlipidemia  Going to start PCKS9 per cards   -     Lipid Panel; Future  Essential hypertension Controlled, continue current management  Stage 3a chronic kidney disease ([address] Ccontinue current management, follows w/ nephro  Multiple thyroid nodules following w/ endo    COVID booster  Declines flu shot  Tdap / shingles at pharm    Recommendations for Preventive Services Due: see orders and patient instructions/AVS.  Recommended screening schedule for the next 5-10 years is provided to the patient in written form: see Patient Instructions/AVS.     Return in 3 months (on 2/11/2023) for Medicare Annual Wellness Visit in 1 year. Subjective     HTN  Been borderline 140s at home  Changed from losartan over past few months  Rx: benicar 40 daily, fredy MWF  Sx denies headache, vision changes, lightheadedness, chest pain, shortness of breath, orthopnea, PND, syncope. Discussed DASH diet, exercise, weight loss     HLD  Allergic to statin  Going to start PCKS9 w/ cards praluent pen  Allergic to statin, lquvio reluctant bc in body for 6 mo and repatha has latex in it. CKD  Recheck  Following w/ nephro  Nephro Joaquin 10/12/22 [facility]  Mra renal artery done 11/5/22     Endo thryoid nodules, trevor  Saw yesterday, going to repeat ultrasound in 3 months for thyroid nodule    Cardiology Dr Susanna Alexander  Manages her HLD    Asthma  Trelegy as needed    Knee pain bilaterally chroniically ad with walking, will occasionally swell, thinks spironolactone med makes it hurt sometimes    Patient's complete Health Risk Assessment and screening values have been reviewed and are found in Flowsheets.  The following problems were reviewed today and where indicated follow up appointments were made and/or referrals ordered. Positive Risk Factor Screenings with Interventions:    Fall Risk:  Do you feel unsteady or are you worried about falling? : (!) yes  2 or more falls in past year?: (!) yes  Fall with injury in past year?: (!) yes   Fall Risk Interventions:    Home safety tips provided             Health Habits/Nutrition:  Physical Activity: Insufficiently Active    Days of Exercise per Week: 2 days    Minutes of Exercise per Session: 10 min     Have you lost any weight without trying in the past 3 months?: No     Have you seen the dentist within the past year?: Yes  Health Habits/Nutrition Interventions: knees are keeping her from exercising more, lives w/ daughter, used to walk w/ daughter but change in schedule now so walking at home  Inadequate physical activity:  will work on increasing activity    Hearing/Vision:  Do you or your family notice any trouble with your hearing that hasn't been managed with hearing aids?: No  Do you have difficulty driving, watching TV, or doing any of your daily activities because of your eyesight?: (!) Yes  Have you had an eye exam within the past year?: Yes  No results found. Hearing/Vision Interventions:  Vision concerns:  patient encouraged to make appointment with his/her eye specialist            Objective   Vitals:    11/11/22 1115   BP: 123/65   Site: Left Upper Arm   Position: Sitting   Pulse: 78   SpO2: 98%   Weight: 187 lb (84.8 kg)      Body mass index is 34.2 kg/m².       Physical Exam   General:  Well-appearing, no acute distress, alert, non-toxic  HEENT:  Normocephalic, atraumatic, without lymphadenopathy, neck supple, EOMI, oropharynx clear with no exudate seen  Cardiovascular: normal heart rate, normal rhythm, no murmurs, rubs or gallops  Respiratory: normal breath sounds, good air movement, no respiratory distress, no wheezing, rales or rhonchi  GI: bowel sounds normal, soft, non-distended, no tenderness, no masses or peritoneal signs  Extremities: intact distal pulses, warm, dry, well perfused, without clubbing, cyanosis or edema, normal movement of all extremities. No joint swelling, deformity or tenderness. Skin:  No rash, warm and dry  PSYCH:  alert and oriented x 3; normal affect  NEURO:  CN2-12 grossly intact, normal motor function, normal sensory function, normal speech, no gross focal deficits noted, gait within normal         Allergies   Allergen Reactions    Latex Itching    Crestor [Rosuvastatin Calcium] Other (See Comments)     Muscle pain    Pcn [Penicillins] Anaphylaxis    Eggs [Egg White]     Flagyl [Metronidazole] Diarrhea     Blood in stool    Lipitor      Muscle pain. Motrin [Ibuprofen Micronized] Other (See Comments)     Head tightness    Nsaids     Sulfa Antibiotics      hypertension    Aspirin Nausea And Vomiting    Imidazole Antifungals Nausea And Vomiting and Other (See Comments)    Quinolones Nausea And Vomiting     Prior to Visit Medications    Medication Sig Taking? Authorizing Provider   olmesartan (BENICAR) 40 MG tablet Take 1 tablet by mouth daily Yes Patricia Avila MD   fluticasone-umeclidin-vilant (TRELEGY ELLIPTA) 100-62.5-25 MCG/INH AEPB Inhale 1 puff into the lungs daily Yes Patricia Avila MD   albuterol sulfate HFA (PROVENTIL;VENTOLIN;PROAIR) 108 (90 Base) MCG/ACT inhaler Inhale 2 puffs into the lungs every 6 hours as needed for Wheezing Yes Patricia Avila MD   triamcinolone (KENALOG) 0.1 % ointment APPLY TOPICALLY TWICE A DAY AS NEEDED FOR ITCHING.  USE SPARINGLY Yes Patricia Avila MD   spironolactone (ALDACTONE) 25 MG tablet TAKE 1 TABLET DAILY  Patient taking differently: as needed TAKE 1 TABLET DAILY Yes Ariadne Mittal MD   famotidine (PEPCID) 20 MG tablet Take 1 tablet by mouth 2 times daily  Patient taking differently: Take 20 mg by mouth as needed Yes Ariadne Mittal MD   ketoconazole (NIZORAL) 2 % cream APPLY TOPICALLY Ольга Dietz Yes Monserrat Quijano MD   fluticasone (FLONASE) 50 MCG/ACT nasal spray 2 sprays by Each Nostril route daily  Patient taking differently: 2 sprays by Each Nostril route as needed Yes Monserrat Quijano MD   hydrocortisone (ANUSOL-HC) 2.5 % CREA rectal cream USE A FINGERTIP AMOUNT RECTALLY TWICE DAILY Yes Monserrat Quijano MD   Nutritional Supplements (JUICE PLUS FIBRE PO) Take by mouth Yes Historical Provider, MD   Ascorbic Acid (VITAMIN C) 250 MG tablet Take 250 mg by mouth daily Yes Historical Provider, MD   Cholecalciferol (VITAMIN D3) 50 MCG (2000 UT) CAPS Take 1 capsule by mouth daily If not covered , will buy over the counter. Patient taking differently: Take 1 capsule by mouth as needed If not covered , will buy over the counter. Yes Chani Pitts MD   multivitamin [facility]) per tablet Take 1 tablet by mouth daily.    Yes Historical Provider, MD   cetirizine (ZYRTEC) 5 MG tablet Take 1 tablet by mouth daily  Patient taking differently: Take 5 mg by mouth as needed  Monserrat Quijano MD       [facility] (Including outside providers/suppliers regularly involved in providing care):   Patient Care Team:  Monserrat Quijano MD as PCP - General (Family Medicine)  Monserrat Quijano MD as PCP - [facility] Empaneled Provider  Yessenia Early MD as Consulting Physician (Gastroenterology)  Mk Rendon MD as Surgeon (General Surgery)  Clair pizano     Reviewed and updated this visit:  Tobacco  Allergies  Meds  Problems  Med Hx  Surg Hx  Soc Hx  Fam Hx

## 2023-02-02 ENCOUNTER — HOSPITAL ENCOUNTER (OUTPATIENT)
Age: 83
Discharge: HOME | End: 2023-02-02
Payer: MEDICARE

## 2023-02-02 ENCOUNTER — HOSPITAL ENCOUNTER (OUTPATIENT)
Dept: ULTRASOUND IMAGING | Age: 83
Discharge: HOME | End: 2023-02-02
Payer: MEDICARE

## 2023-02-02 DIAGNOSIS — I10 ESSENTIAL HYPERTENSION: ICD-10-CM

## 2023-02-02 DIAGNOSIS — N18.31 STAGE 3A CHRONIC KIDNEY DISEASE (HCC): ICD-10-CM

## 2023-02-02 DIAGNOSIS — R73.03 PREDIABETES: ICD-10-CM

## 2023-02-02 DIAGNOSIS — E78.2 MIXED HYPERLIPIDEMIA: ICD-10-CM

## 2023-02-02 DIAGNOSIS — E04.2 MULTIPLE THYROID NODULES: ICD-10-CM

## 2023-02-02 LAB
T4 FREE: 1 NG/DL (ref 0.9–1.8)
TSH SERPL DL<=0.05 MIU/L-ACNC: 1.67 UIU/ML (ref 0.27–4.2)

## 2023-02-02 PROCEDURE — 84443 ASSAY THYROID STIM HORMONE: CPT

## 2023-02-02 PROCEDURE — 84439 ASSAY OF FREE THYROXINE: CPT

## 2023-02-02 PROCEDURE — 36415 COLL VENOUS BLD VENIPUNCTURE: CPT

## 2023-02-02 PROCEDURE — 76536 US EXAM OF HEAD AND NECK: CPT

## 2023-02-14 ENCOUNTER — OFFICE VISIT (OUTPATIENT)
Dept: ENDOCRINOLOGY | Age: 83
End: 2023-02-14
Payer: MEDICARE

## 2023-02-14 VITALS
HEIGHT: 62 IN | SYSTOLIC BLOOD PRESSURE: 125 MMHG | WEIGHT: 187 LBS | DIASTOLIC BLOOD PRESSURE: 71 MMHG | BODY MASS INDEX: 34.41 KG/M2 | HEART RATE: 77 BPM

## 2023-02-14 DIAGNOSIS — I10 ESSENTIAL HYPERTENSION: ICD-10-CM

## 2023-02-14 DIAGNOSIS — E78.2 MIXED HYPERLIPIDEMIA: ICD-10-CM

## 2023-02-14 DIAGNOSIS — N18.31 STAGE 3A CHRONIC KIDNEY DISEASE (HCC): ICD-10-CM

## 2023-02-14 DIAGNOSIS — E04.2 MULTIPLE THYROID NODULES: Primary | ICD-10-CM

## 2023-02-14 PROCEDURE — 3074F SYST BP LT 130 MM HG: CPT | Performed by: INTERNAL MEDICINE

## 2023-02-14 PROCEDURE — 1123F ACP DISCUSS/DSCN MKR DOCD: CPT | Performed by: INTERNAL MEDICINE

## 2023-02-14 PROCEDURE — 3078F DIAST BP <80 MM HG: CPT | Performed by: INTERNAL MEDICINE

## 2023-02-14 PROCEDURE — 99214 OFFICE O/P EST MOD 30 MIN: CPT | Performed by: INTERNAL MEDICINE

## 2023-02-14 NOTE — PROGRESS NOTES
SUBJECTIVE:  Dani Dixon is a 81 y.o. female who is referred here for Thyroid nodules . She was diagnosed with a thyroid nodule after she underwent a Ct scan after a fall [employer] had a fall with neck stiffness in May 2022. She  had CT head without contrast and CT cervical spine. CT cervical spine incidentally noted thyroid nodules, largest on the left side. She then had thyroid ultrasound showing 1.6cm TR4 nodule    current complaints: denies fatigue, weight changes, heat/cold intolerance, bowel/skin changes or CVS symptoms  History of obstructive symptoms: difficulty swallowing No, changes in voice/hoarseness No.  Patient's daughter and mother both underwent biopsies and had normal pathology. History of radiation to patient's neck: NO  Recent iodine exposure: No  Family history includes goiter. Family history of thyroid cancer: No    She has esophageal stricture and had dilatation done in the past .  She has hypertension and is on meds and follows with cardiology . She has GERD, Fibromyalgia and CKD stage 3 follows with nephrology. INTERIM   Denies any worsening  with swallowing and chocking . Thyroid nodule in the left lobe has increased in size patient is accompanied by her daughter who also had underwent thyroid biopsy in the past and had benign nodules        Past Medical History:   Diagnosis Date    Allergic rhinitis     Asthma     Bilateral carpal tunnel syndrome 12/21/2015    Attached Notes    Procedures by Santos Potter MD at 12/17/2015 4:21 PM    Author: Santos Potter MD Service: Physical Medicine and Rehabilitation Author Type: Physician   Filed: 12/21/2015 12:22 AM Note Time: 12/17/2015 4:21 PM Note Type: Procedures   Status: Signed : Santos Potter MD (Physician)    Pre-procedure Diagnoses   1.  Paresthesias [R20.2]        Post-procedure Diagnoses     Chronic back pain     COPD (chronic obstructive pulmonary disease) (HCC)     Dizziness     Fibromyalgia     GERD (gastroesophageal reflux disease)     Hyperlipidemia     Hypertension     Internal hemorrhoid, bleeding 12/12/2012    Hemorrhoid surgery  In 2018    Irritable bowel syndrome     LLQ pain 6/26/2020    Obesity     Osteoarthritis     Osteopenia     Rash     Renal calculus 5/6/2013    Restless legs syndrome     Sciatica 7/16/2014    Sinusitis     Tinnitus     TMJ dysfunction     Urinary incontinence      Patient Active Problem List    Diagnosis Date Noted    Chronic renal disease, stage III ([facility] [address] [706791] 08/24/2022    Dry skin dermatitis 05/24/2022    Seasonal allergies 04/20/2022    Intertrigo 04/20/2022    Hidradenitis suppurativa 04/20/2022    Multiple thyroid nodules 08/24/2022    Personal history of colonic polyps 09/23/2020    Gastroesophageal reflux disease without esophagitis 06/26/2020    Multiple food allergies 06/26/2020    Moderate mitral insufficiency 05/08/2018    Pulmonary hypertension ([facility] [address] 05/24/2017    Essential hypertension 06/03/2016    Mixed hyperlipidemia 12/09/2015    Asthma exacerbation 06/04/2015    Liver mass 05/06/2013    Prediabetes 02/23/2012    Vitamin D deficiency 02/23/2012     Past Surgical History:   Procedure Laterality Date    APPENDECTOMY      CHOLECYSTECTOMY      COLONOSCOPY  2011     Dr Sujey Leon  07/10/2018    at [facility] done by Dr. Nay Jj (CERVIX STATUS UNKNOWN)      HYSTERECTOMY, TOTAL ABDOMINAL (CERVIX REMOVED)      1994     Family History   Problem Relation Age of Onset    Arthritis Mother     Cancer Mother         breast cancer    Diabetes Mother     Heart Disease Mother     High Blood Pressure Mother     Kidney Disease Mother     High Cholesterol Mother     Arthritis Father     Cancer Father         lung cancer    Diabetes Father     Heart Disease Father     High Blood Pressure Father     Kidney Disease Father     High Cholesterol Father     Diabetes Sister     Heart Disease Brother 58        coronary artery disease with stent    Diabetes Brother     Heart Disease Brother 65        cad     Social History     Socioeconomic History    Marital status:      Spouse name: None    Number of children: None    Years of education: None    Highest education level: None   Tobacco Use    Smoking status: Former     Packs/day: 1.00     Years: 12.00     Pack years: 12.00     Types: Cigarettes     Quit date: 1972     Years since quittin.1    Smokeless tobacco: Never    Tobacco comments:     quit when 31 years old   Substance and Sexual Activity    Alcohol use: No    Drug use: No    Sexual activity: Never     Partners: Male     Social Determinants of Health     Financial Resource Strain: Low Risk     Difficulty of Paying Living Expenses: Not hard at all   Food Insecurity: No Food Insecurity    Worried About Running Out of Food in the Last Year: Never true    Ran Out of Food in the Last Year: Never true   Transportation Needs: No Transportation Needs    Lack of Transportation (Medical): No    Lack of Transportation (Non-Medical): No   Physical Activity: Insufficiently Active    Days of Exercise per Week: 3 days    Minutes of Exercise per Session: 10 min   Housing Stability: Unknown    Unable to Pay for Housing in the Last Year: No    Unstable Housing in the Last Year: No     Current Outpatient Medications   Medication Sig Dispense Refill    olmesartan (BENICAR) 40 MG tablet Take 1 tablet by mouth daily 90 tablet 3    fluticasone-umeclidin-vilant (TRELEGY ELLIPTA) 100-62.5-25 MCG/INH AEPB Inhale 1 puff into the lungs daily 3 each 5    albuterol sulfate HFA (PROVENTIL;VENTOLIN;PROAIR) 108 (90 Base) MCG/ACT inhaler Inhale 2 puffs into the lungs every 6 hours as needed for Wheezing 18 g 3    triamcinolone (KENALOG) 0.1 % ointment APPLY TOPICALLY TWICE A DAY AS NEEDED FOR ITCHING. USE SPARINGLY 80 g 8    spironolactone (ALDACTONE) 25 MG tablet TAKE 1 TABLET DAILY (Patient taking differently: as needed TAKE 1 TABLET DAILY) 90 tablet 3     famotidine (PEPCID) 20 MG tablet Take 1 tablet by mouth 2 times daily (Patient taking differently: Take 20 mg by mouth as needed) 180 tablet 1    cetirizine (ZYRTEC) 5 MG tablet Take 1 tablet by mouth daily (Patient taking differently: Take 5 mg by mouth as needed) 90 tablet 1    ketoconazole (NIZORAL) 2 % cream APPLY TOPICALLY UNDER BREAST OR IN GROIN TWICE A DAY AS NEEDED FOR RASH 60 g 11    fluticasone (FLONASE) 50 MCG/ACT nasal spray 2 sprays by Each Nostril route daily (Patient taking differently: 2 sprays by Each Nostril route as needed) 16 g 5    hydrocortisone (ANUSOL-HC) 2.5 % CREA rectal cream USE A FINGERTIP AMOUNT RECTALLY TWICE DAILY 28 g 3    Nutritional Supplements (JUICE PLUS FIBRE PO) Take by mouth      Ascorbic Acid (VITAMIN C) 250 MG tablet Take 250 mg by mouth daily      Cholecalciferol (VITAMIN D3) 50 MCG (2000 UT) CAPS Take 1 capsule by mouth daily If not covered , will buy over the counter. (Patient taking differently: Take 1 capsule by mouth as needed If not covered , will buy over the counter.) 90 capsule 0    multivitamin (THERAGRAN) per tablet Take 1 tablet by mouth daily. No current facility-administered medications for this visit. Allergies   Allergen Reactions    Latex Itching    Crestor [Rosuvastatin Calcium] Other (See Comments)     Muscle pain    Pcn [Penicillins] Anaphylaxis    Eggs [Egg White]     Flagyl [Metronidazole] Diarrhea     Blood in stool    Lipitor      Muscle pain. Motrin [Ibuprofen Micronized] Other (See Comments)     Head tightness    Nsaids     Sulfa Antibiotics      hypertension    Aspirin Nausea And Vomiting    Imidazole Antifungals Nausea And Vomiting and Other (See Comments)    Quinolones Nausea And Vomiting         Review of Systems:  I have reviewed the review of system questionnaire filled by the patient .   Patient was advised to contact PCP for non endocrine signs and symptoms       OBJECTIVE:   /71   Pulse 77   Ht 5' 2\" (1.575 m)   Wt 187 lb (84.8 kg)   BMI 34.20 kg/m²   Wt Readings from Last 3 Encounters:   02/14/23 187 lb (84.8 kg)   11/11/22 187 lb (84.8 kg)   11/10/22 187 lb (84.8 kg)       Physical Exam:  Constitutional: no acute distress, well appearing, well nourished  Psychiatric: oriented to person, place and time, judgement, insight and normal, recent and remote memory and intact and mood, affect are normal  Skin: skin and subcutaneous tissue is normal without mass, normal turgor  Head and Face: examination of head and face revealed no abnormalities  Eyes: no lid or conjunctival swelling, no erythema or discharge, pupils are normal, equal, round. Ears/Nose: external inspection of ears and nose revealed no abnormalities, hearing is grossly normal  Oropharynx/Mouth/Face: lips, tongue and gums are normal with no lesions, the voice quality was normal  Neck: neck is supple and symmetric, with midline trachea and no masses, thyroid is enlarged  Lymphatics: normal cervical lymph nodes, normal supraclavicular nodes  Pulmonary: no increased work of breathing or signs of respiratory distress, lungs are clear to auscultation  Cardiovascular: normal heart rate and rhythm, normal S1 and S2, no murmurs   Abdomen: abdomen is soft, non-tender with no masses  Musculoskeletal: normal gait and station. Neurological: normal coordination, normal general cortical function    Lab Review:  Lab Results   Component Value Date/Time    TSH 1.67 02/02/2023 12:56 PM    TSH 1.78 08/13/2015 01:03 PM    TSH 1.45 01/13/2012 11:52 AM     Lab Results   Component Value Date/Time    T4FREE 1.0 02/02/2023 12:56 PM        ASSESSMENT/PLAN:    -- Multiple thyroid nodules  With 1.6 cm dominant thyroid nodule in the left lobe identified. Size increased to 2.4 cm in feb 2023 imaging fine-needle biopsy recommended  We will schedule her in my clinic in 1 to 2 weeks to get a fine-needle biopsy of her left lobe nodule.   Patient is not on any blood thinners  Patient denies any family history of thyroid cancer, denies any radiation exposure. Baseline thyroid ultrasound done in June 2022, this was an incidental finding after CT scan of head after a fall. Previously patient's daughter and her mother both have undergone fine-needle aspiration biopsy with benign results. -- I did an informal thyroid ultrasound which shows a solid hypoechoic nodule in the left lobe with some calcification noted. Patient has been advised by her previous endocrinologist to get a fine-needle aspiration biopsy which she never did    TSH normal in feb 2023 Feb 2023    Impression   Ultrasound-guided biopsy is again recommended for a 2.4 cm TR 4 left thyroid   nodule which has increased in size since the prior study. 1 year follow-up is recommended for a subtle 1.2 cm TR 4 nodule in the right   lobe versus gland heterogeneity. RECOMMENDATIONS:   NODULE 1:  ACR TI-RADS TR4:       Recommend:  Ultrasound-guided fine needle aspiration. NODULE 2:  ACR TI-RADS TR4:       Recommend: Follow-up ultrasound in 1 year.      --- Stage 3a chronic kidney disease ([address]  Follows with nephrology     --- Prediabetes  Stable follows with primary care physician    --- Mixed hyperlipidemia  On statins stable continue with the current therapy       --- Essential hypertension  blood pressure control is adequate at home, denies any chest pain shortness of breath or headache    Reviewed and/or ordered clinical lab results Yes  Reviewed and/or ordered radiology tests Yes   Reviewed and/or ordered other diagnostic tests Yes  Made a decision to obtain old records Yes  Reviewed and summarized old records Yes      Jackie Nuñez was counseled regarding symptoms of current diagnosis, course and complications of disease if inadequately treated, side effects of medications, diagnosis, treatment options, and prognosis, risks, benefits, complications, and alternatives of treatment, labs, imaging and other studies and treatment targets and goals. She understands instructions and counseling. I spent  30 + minutes which includes reviewing patient chart , interpreting previous lab results  , discussing and providing counseling and coordinating care of patient's multiple health issues with  the patient. Return in about 6 months (around 8/14/2023). Please note that some or all of this report was generated using voice recognition software. Please notify me in case of any questions about the content of this document, as some errors in transcription may have occurred .

## 2023-02-21 ENCOUNTER — OFFICE VISIT (OUTPATIENT)
Dept: ORTHOPEDIC SURGERY | Age: 83
End: 2023-02-21
Payer: MEDICARE

## 2023-02-21 VITALS — WEIGHT: 187 LBS | BODY MASS INDEX: 34.41 KG/M2 | HEIGHT: 62 IN

## 2023-02-21 DIAGNOSIS — M17.12 PRIMARY OSTEOARTHRITIS OF LEFT KNEE: ICD-10-CM

## 2023-02-21 DIAGNOSIS — M17.11 PRIMARY OSTEOARTHRITIS OF RIGHT KNEE: ICD-10-CM

## 2023-02-21 DIAGNOSIS — M25.561 RIGHT KNEE PAIN, UNSPECIFIED CHRONICITY: Primary | ICD-10-CM

## 2023-02-21 DIAGNOSIS — M25.562 LEFT KNEE PAIN, UNSPECIFIED CHRONICITY: ICD-10-CM

## 2023-02-21 PROCEDURE — 1123F ACP DISCUSS/DSCN MKR DOCD: CPT | Performed by: ORTHOPAEDIC SURGERY

## 2023-02-21 PROCEDURE — 99203 OFFICE O/P NEW LOW 30 MIN: CPT | Performed by: ORTHOPAEDIC SURGERY

## 2023-02-21 NOTE — PROGRESS NOTES
2/21/2023     Reason for visit:  Bilateral knee pain    History of Present Illness: The patient is an 81-year-old female who presents for evaluation of her bilateral knees. She reports several years of bilateral knee pain. No traumatic injury. The pain is diffuse about both knees. The pain is made worse with standing, walking, stairs. Occasional swelling. No catching or locking. She has received both cortisone as well as hyaluronic acid injections to her knees in the past however the last injection she had were few years ago. She has had more success with the hyaluronic acid than the cortisone. Medical History:  Past Medical History:   Diagnosis Date    Allergic rhinitis     Asthma     Bilateral carpal tunnel syndrome 12/21/2015    Attached Notes    Procedures by Alex Rivero MD at 12/17/2015 4:21 PM    Author: Alex Rivero MD Service: Physical Medicine and Rehabilitation Author Type: Physician   Filed: 12/21/2015 12:22 AM Note Time: 12/17/2015 4:21 PM Note Type: Procedures   Status: Signed : Alex Rivero MD (Physician)    Pre-procedure Diagnoses   1.  Paresthesias [R20.2]        Post-procedure Diagnoses     Chronic back pain     COPD (chronic obstructive pulmonary disease) (HCC)     Dizziness     Fibromyalgia     GERD (gastroesophageal reflux disease)     Hyperlipidemia     Hypertension     Internal hemorrhoid, bleeding 12/12/2012    Hemorrhoid surgery  In 2018    Irritable bowel syndrome     LLQ pain 6/26/2020    Obesity     Osteoarthritis     Osteopenia     Rash     Renal calculus 5/6/2013    Restless legs syndrome     Sciatica 7/16/2014    Sinusitis     Tinnitus     TMJ dysfunction     Urinary incontinence       Past Surgical History:   Procedure Laterality Date    APPENDECTOMY      CHOLECYSTECTOMY      COLONOSCOPY  2011     Dr Olaf Skinner  07/10/2018    at [facility] done by Dr. Mateus Duncan ([address] [place] [address])      HYSTERECTOMY, TOTAL ABDOMINAL (CERVIX REMOVED)            Family History   Problem Relation Age of Onset    Arthritis Mother     Cancer Mother         breast cancer    Diabetes Mother     Heart Disease Mother     High Blood Pressure Mother     Kidney Disease Mother     High Cholesterol Mother     Arthritis Father     Cancer Father         lung cancer    Diabetes Father     Heart Disease Father     High Blood Pressure Father     Kidney Disease Father     High Cholesterol Father     Diabetes Sister     Heart Disease Brother 58        coronary artery disease with stent    Diabetes Brother     Heart Disease Brother 72        cad      Social History     Socioeconomic History    Marital status:      Spouse name: Not on file    Number of children: Not on file    Years of education: Not on file    Highest education level: Not on file   Occupational History    Not on file   Tobacco Use    Smoking status: Former     Packs/day: 1.00     Years: 12.00     Pack years: 12.00     Types: Cigarettes     Quit date: 1972     Years since quittin.1    Smokeless tobacco: Never    Tobacco comments:     quit when 28years old   Substance and Sexual Activity    Alcohol use: No    Drug use: No    Sexual activity: Never     Partners: Male   Other Topics Concern    Not on file   Social History Narrative    Not on file     Social Determinants of Health     Financial Resource Strain: Low Risk     Difficulty of Paying Living Expenses: Not hard at all   Food Insecurity: No Food Insecurity    Worried About [facility] in the Last Year: Never true    [facility] in the Last Year: Never true   Transportation Needs: No Transportation Needs    Lack of Transportation (Medical): No    Lack of Transportation (Non-Medical):  No   Physical Activity: Insufficiently Active    Days of Exercise per Week: 3 days    Minutes of Exercise per Session: 10 min   Stress: Not on file   Social Connections: Not on file   Intimate Partner Violence: Not on file   Housing Stability: Unknown    Unable to Pay for Housing in the Last Year: No    Number of Places Lived in the Last Year: Not on file    Unstable Housing in the Last Year: No      Current Outpatient Medications on File Prior to Visit   Medication Sig Dispense Refill    olmesartan (BENICAR) 40 MG tablet Take 1 tablet by mouth daily 90 tablet 3    fluticasone-umeclidin-vilant (TRELEGY ELLIPTA) 100-62.5-25 MCG/INH AEPB Inhale 1 puff into the lungs daily 3 each 5    albuterol sulfate HFA (PROVENTIL;VENTOLIN;PROAIR) 108 (90 Base) MCG/ACT inhaler Inhale 2 puffs into the lungs every 6 hours as needed for Wheezing 18 g 3    triamcinolone (KENALOG) 0.1 % ointment APPLY TOPICALLY TWICE A DAY AS NEEDED FOR ITCHING. USE SPARINGLY 80 g 8    spironolactone (ALDACTONE) 25 MG tablet TAKE 1 TABLET DAILY (Patient taking differently: as needed TAKE 1 TABLET DAILY) 90 tablet 3    famotidine (PEPCID) 20 MG tablet Take 1 tablet by mouth 2 times daily (Patient taking differently: Take 20 mg by mouth as needed) 180 tablet 1    cetirizine (ZYRTEC) 5 MG tablet Take 1 tablet by mouth daily (Patient taking differently: Take 5 mg by mouth as needed) 90 tablet 1    ketoconazole (NIZORAL) 2 % cream APPLY TOPICALLY UNDER BREAST OR IN GROIN TWICE A DAY AS NEEDED FOR RASH 60 g 11    fluticasone (FLONASE) 50 MCG/ACT nasal spray 2 sprays by Each Nostril route daily (Patient taking differently: 2 sprays by Each Nostril route as needed) 16 g 5    hydrocortisone (ANUSOL-HC) 2.5 % CREA rectal cream USE A FINGERTIP AMOUNT RECTALLY TWICE DAILY 28 g 3    Nutritional Supplements (JUICE PLUS FIBRE PO) Take by mouth      Ascorbic Acid (VITAMIN C) 250 MG tablet Take 250 mg by mouth daily      Cholecalciferol (VITAMIN D3) 50 MCG (2000 UT) CAPS Take 1 capsule by mouth daily If not covered , will buy over the counter.  (Patient taking differently: Take 1 capsule by mouth as needed If not covered , will buy over the counter.) 90 capsule 0    multivitamin [facility]) per tablet Take 1 tablet by mouth daily. No current facility-administered medications on file prior to visit. Allergies   Allergen Reactions    Latex Itching    Crestor [Rosuvastatin Calcium] Other (See Comments)     Muscle pain    Pcn [Penicillins] Anaphylaxis    Eggs [Egg White]     Flagyl [Metronidazole] Diarrhea     Blood in stool    Lipitor      Muscle pain. Motrin [Ibuprofen Micronized] Other (See Comments)     Head tightness    Nsaids     Sulfa Antibiotics      hypertension    Aspirin Nausea And Vomiting    Imidazole Antifungals Nausea And Vomiting and Other (See Comments)    Quinolones Nausea And Vomiting        Review of Systems:  Constitutional: Patient is adequately groomed with no evidence of malnutrition  Mental Status: The patient is oriented to time, place and person. The patient's mood and affect are appropriate. Lymphatic: The lymphatic examination bilaterally reveals all areas to be without enlargement or induration. Vascular: Examination reveals no swelling or calf tenderness. Peripheral pulses are palpable and 2+. Neurological: The patient has good coordination. There is no weakness or sensory deficit. Skin:  Head/Neck: inspection reveals no rashes, ulcerations or lesions. Trunk: inspection reveals no rashes, ulcerations or lesions.     Objective:  Ht 5' 2\" (1.575 m)   Wt 187 lb (84.8 kg)   BMI 34.20 kg/m²      Physical Exam:  The patient is well-appearing and in no apparent distress  Examination of the right and left knee   There is a trace effusion, no gross deformity or skin changes  Range of motion reveals 0 to 125  Neg lachman, negative posterior drawer, no pain or laxity with varus or valgus stress at 0 degrees and 30 degrees of flexion  + joint line tenderness  5 out of 5 strength throughout distal muscle groups  Sensation is intact to light touch throughout all distributions  There is no calf swelling or tenderness  Palpable DP pulse, brisk cap refill, 2+ symmetric reflexes     Imagin view x-rays of the right and left knees were obtained the office today on 2023 and reviewed. There is no fracture or dislocation. Tricompartmental degenerative changes of both knees where there is decreased joint space and osteophyte formation. Assessment:  Bilateral knee degenerative joint disease    Plan:  I discussed with the patient the diagnosis and treatment options. We discussed operative and nonoperative management. At this point I do recommend nonoperative management. Nonoperative treatment options include activity modification, anti-inflammatory medications, physical therapy, and injections. She wishes to proceed with hyaluronic acid injections. Therefore we will submit for approval.  The patient return once approval has been obtained. In the future she may also be a candidate for total knee arthroplasty if she does not respond favorably to the injections. Greater than 30 minutes were spent with this encounter. Time spent included evaluating the patient's chart prior to arrival.  Evaluating the patient in the office including history, physical examination, imaging reviewing, and counseling on next steps. Lastly, time was spent discussing orders with my staff as well as providing documentation in the chart. Amber Cooper MD            Orthopaedic Surgery Sports Medicine and [facility] and [facility] Physician [facility] ([address])      Disclaimer: This note was dictated with voice recognition software. Though review and correction are routine, we apologize for any errors.

## 2023-02-21 NOTE — Clinical Note
Dear Dr. Coughlin,  Thank you very much for the referral and allowing me to participate in this patient's care.  Please see the attached note for full details of the visit.  With kind regards, Kuldeep Guzman MD

## 2023-02-28 ENCOUNTER — PROCEDURE VISIT (OUTPATIENT)
Dept: ENDOCRINOLOGY | Age: 83
End: 2023-02-28

## 2023-02-28 ENCOUNTER — TELEPHONE (OUTPATIENT)
Dept: ORTHOPEDIC SURGERY | Age: 83
End: 2023-02-28

## 2023-02-28 DIAGNOSIS — E04.1 THYROID NODULE: Primary | ICD-10-CM

## 2023-02-28 NOTE — TELEPHONE ENCOUNTER
DUROLANE  (QTY 1)   BILATERAL KNEE        NO PA REQUIRED. VALID & BILLABLE ON CLAIM YES. BUY AND BILL. Per [address] Arash Blunt. PER [employer]. [address] ENRIQUETA DUROLANE. APPROVED. SHE MAY [address] F/U WITH CC FOR ENRIQUETA DUROLANE WHEN RETURNS CALL. .

## 2023-02-28 NOTE — PROGRESS NOTES
Operating Physician: Lissy Miles MD  Assistant : Vivianne Lefort , LPN     Indication: Left lobe thyroid Lobe Nodule     Details: An informed consent was obtained after explaining the risks and benefits of the procedure to patient. Patient was told that the procedure can result in pain, infection, bleeding, damage to nerves, blood vessels, trachea, esophagus and other structures in neck. Patient was also told about the possibility of non-diagnostic results. Patient consented to the procedure and was placed in appropriate position with neck extended. Under direct ultrasound guidance thyroid gland was visualized and the left lobe nodule localized. Ice pack was used to anesthetize the area . 3 passes were made into the nodule 2 passes were made with 27 G 1 1/4 needle and one pass was made with 25 G 1 1/2 inch needle  . Specimen was transferred into the cytolyt solution , specimen  was also obtained for molecular testing. Patient tolerated the procedure well and no immediate complications were noted. Patient was instructed to call us back in case of any new symptoms or signs.

## 2023-03-07 ENCOUNTER — TELEPHONE (OUTPATIENT)
Dept: ENDOCRINOLOGY | Age: 83
End: 2023-03-07

## 2023-03-07 NOTE — TELEPHONE ENCOUNTER
I am assuming she is asking for the results of biopsy? Did we get fax from [address] [place] [address]?

## 2023-03-07 NOTE — TELEPHONE ENCOUNTER
Pt requesting results from recent [address] thyroid     Call back # [phone]

## 2023-03-08 NOTE — TELEPHONE ENCOUNTER
Left VM for patient to notify her that we have not received the results yet. I advised that if we do not have them by Friday, I will call and check on them.

## 2023-03-13 NOTE — TELEPHONE ENCOUNTER
Fax from [address] [place] [address]    Does not meet optimal evaluation.  Consider repeat FNA    Re:FNA 2/28/22

## 2023-03-14 NOTE — TELEPHONE ENCOUNTER
Discussed results with patient and her daughter   Advised that FNA was non-diagnostic I would recommend repeating FNAB also discussed close follow up   they elected to repeat thyroid uls at 6 months

## 2023-05-04 ENCOUNTER — HOSPITAL ENCOUNTER (OUTPATIENT)
Dept: PHYSICAL THERAPY | Age: 83
Setting detail: THERAPIES SERIES
Discharge: HOME | End: 2023-05-04
Payer: MEDICARE

## 2023-05-04 PROCEDURE — 97110 THERAPEUTIC EXERCISES: CPT

## 2023-05-04 PROCEDURE — 97162 PT EVAL MOD COMPLEX 30 MIN: CPT

## 2023-05-04 PROCEDURE — 97140 MANUAL THERAPY 1/> REGIONS: CPT

## 2023-05-04 NOTE — PLAN OF CARE
[address] [address], [address] [place], [place] [address]  Phone: [phone]   Fax: [phone]                                                     Physical Therapy Certification    Dear Tammi Wolf MD  ,    We had the pleasure of evaluating the following patient for physical therapy services at [address] [place] [address]. A summary of our findings can be found in the initial assessment below. This includes our plan of care. If you have any questions or concerns regarding these findings, please do not hesitate to contact me at the office phone number checked above. Thank you for the referral.       Physician Signature:_______________________________Date:__________________  By signing above (or electronic signature), therapists plan is approved by physician      Patient: Paul Farmer   : 1940   MRN: [id number]  Referring Physician: Tammi Wolf MD        Evaluation Date: 2023      Medical Diagnosis Information:  Unspecified rotator cuff tear or rupture of right shoulder, not specified as traumatic [M75.101]   PT diagnosis: impaired posture, decreased R GH AROM and PROM, decreased R UE strength                                      Insurance information:  [facility] Medicare Advantage    Precautions/ Contra-indications: None  Latex Allergy:  [x]NO      []YES  Preferred Language for Healthcare:   [x]English       []Other:    C-SSRS Triggered by Intake questionnaire (Past 2 wk assessment ):   [x] No, Questionnaire did not trigger screening.   [] Yes, Patient intake triggered C-SSRS Screening     [] Completed, no further action required. [] Completed, PCP notified via Epic    SUBJECTIVE: Patient stated complaint: R shoulder pain onset 1 month. Pt was completing an aquatic class and overextended the arm which caused the pain. Pt notices pain at rest and with movement. Pt went to the physician and received two injections.   The first

## 2023-05-04 NOTE — FLOWSHEET NOTE
Exercise Program:    [x] (78530) Reviewed/Progressed HEP activities related to strengthening, flexibility, endurance, ROM of scapular, scapulothoracic and UE control with self care, reaching, carrying, lifting, house/yardwork, driving/computer work  [] (13849) Reviewed/Progressed HEP activities related to improving balance, coordination, kinesthetic sense, posture, motor skill, proprioception of scapular, scapulothoracic and UE control with self care, reaching, carrying, lifting, house/yardwork, driving/computer work      Manual Treatments:  PROM / STM / Oscillations-Mobs:  G-I, II, III, IV (PA's, Inf., Post.)  [] (78714) Provided manual therapy to mobilize soft tissue/joints of cervical/CT, scapular GHJ and UE for the purpose of modulating pain, promoting relaxation,  increasing ROM, reducing/eliminating soft tissue swelling/inflammation/restriction, improving soft tissue extensibility and allowing for proper ROM for normal function with self care, reaching, carrying, lifting, house/yardwork, driving/computer work      Charges:  Timed Code Treatment Minutes: 30   Total Treatment Minutes: 45       [] EVAL - LOW (79039)   [x] EVAL - MOD (71666)  [] EVAL - HIGH (14600)  [] RE-EVAL (49412)  [x] BR(73567) x  1     [] Ionto  [] NMR (41418) x       [] Vaso  [x] Manual (34356) x    1   [] Ultrasound  [] TA x        [] Mech Traction (78564)  [] Aquatic Therapy x     [] ES (un) (43602):   [] Home Management Training x  [] ES(attended) (91977)   [] Dry Needling 1-2 muscles (63805):  [] Dry Needling 3+ muscles (235691  [] Group:      [] Other:                    GOALS:  Patient stated goal: not need to use heating pad, regain use of R arm. [] Progressing: [] Met: [] Not Met: [] Adjusted     Therapist goals for Patient:   Short Term Goals: To be achieved in: 2 weeks  1. Independent in HEP and progression per patient tolerance, in order to prevent re-injury. [] Progressing: [] Met: [] Not Met: [] Adjusted  2.  Patient will

## 2023-05-12 ENCOUNTER — HOSPITAL ENCOUNTER (OUTPATIENT)
Dept: PHYSICAL THERAPY | Age: 83
Setting detail: THERAPIES SERIES
Discharge: HOME | End: 2023-05-12
Payer: MEDICARE

## 2023-05-12 PROCEDURE — 97140 MANUAL THERAPY 1/> REGIONS: CPT

## 2023-05-12 PROCEDURE — 97110 THERAPEUTIC EXERCISES: CPT

## 2023-05-12 NOTE — FLOWSHEET NOTE
Throwing   []  Stage 4: Sport specific Training/Return to Sport     []  Ready to Return to Play, Meets All Above Stages   []  Not Ready for Return to Sports   Comments:      PLAN: See eval. PT 2x / week for 6 weeks. [] Continue per plan of care [] Alter current plan (see comments)  [x] Plan of care initiated [] Hold pending MD visit [] Discharge    Electronically signed by: Keon Steele, PT, DPT      Note: If patient does not return for scheduled/ recommended follow up visits, this note will serve as a discharge from care along with most recent update on progress.

## 2023-05-16 ENCOUNTER — APPOINTMENT (OUTPATIENT)
Dept: PHYSICAL THERAPY | Age: 83
End: 2023-05-16
Payer: MEDICARE

## 2023-05-19 ENCOUNTER — HOSPITAL ENCOUNTER (OUTPATIENT)
Dept: PHYSICAL THERAPY | Age: 83
Setting detail: THERAPIES SERIES
Discharge: HOME | End: 2023-05-19
Payer: MEDICARE

## 2023-05-19 ENCOUNTER — OFFICE VISIT (OUTPATIENT)
Dept: FAMILY MEDICINE CLINIC | Age: 83
End: 2023-05-19
Payer: MEDICARE

## 2023-05-19 VITALS
OXYGEN SATURATION: 99 % | RESPIRATION RATE: 16 BRPM | DIASTOLIC BLOOD PRESSURE: 73 MMHG | WEIGHT: 184 LBS | SYSTOLIC BLOOD PRESSURE: 120 MMHG | BODY MASS INDEX: 33.86 KG/M2 | HEIGHT: 62 IN | HEART RATE: 68 BPM

## 2023-05-19 DIAGNOSIS — K21.9 GASTROESOPHAGEAL REFLUX DISEASE WITHOUT ESOPHAGITIS: ICD-10-CM

## 2023-05-19 DIAGNOSIS — R73.03 PREDIABETES: ICD-10-CM

## 2023-05-19 DIAGNOSIS — I10 ESSENTIAL HYPERTENSION: ICD-10-CM

## 2023-05-19 DIAGNOSIS — N18.31 STAGE 3A CHRONIC KIDNEY DISEASE (HCC): ICD-10-CM

## 2023-05-19 DIAGNOSIS — Z12.31 BREAST CANCER SCREENING BY MAMMOGRAM: ICD-10-CM

## 2023-05-19 DIAGNOSIS — E78.2 MIXED HYPERLIPIDEMIA: ICD-10-CM

## 2023-05-19 DIAGNOSIS — I10 ESSENTIAL HYPERTENSION: Primary | ICD-10-CM

## 2023-05-19 LAB
ALBUMIN SERPL-MCNC: 4 G/DL (ref 3.4–5)
ALBUMIN/GLOB SERPL: 1.4 {RATIO} (ref 1.1–2.2)
ALP SERPL-CCNC: 70 U/L (ref 40–129)
ALT SERPL-CCNC: 12 U/L (ref 10–40)
ANION GAP SERPL CALCULATED.3IONS-SCNC: 11 MMOL/L (ref 3–16)
AST SERPL-CCNC: 15 U/L (ref 15–37)
BILIRUB SERPL-MCNC: 0.5 MG/DL (ref 0–1)
BUN SERPL-MCNC: 13 MG/DL (ref 7–20)
CALCIUM SERPL-MCNC: 9 MG/DL (ref 8.3–10.6)
CHLORIDE SERPL-SCNC: 108 MMOL/L (ref 99–110)
CO2 SERPL-SCNC: 25 MMOL/L (ref 21–32)
CREAT SERPL-MCNC: 1.1 MG/DL (ref 0.6–1.2)
GFR SERPLBLD CREATININE-BSD FMLA CKD-EPI: 50 ML/MIN/{1.73_M2}
GLUCOSE SERPL-MCNC: 91 MG/DL (ref 70–99)
POTASSIUM SERPL-SCNC: 4.6 MMOL/L (ref 3.5–5.1)
PROT SERPL-MCNC: 6.8 G/DL (ref 6.4–8.2)
SODIUM SERPL-SCNC: 144 MMOL/L (ref 136–145)

## 2023-05-19 PROCEDURE — 3074F SYST BP LT 130 MM HG: CPT | Performed by: FAMILY MEDICINE

## 2023-05-19 PROCEDURE — 97110 THERAPEUTIC EXERCISES: CPT

## 2023-05-19 PROCEDURE — 99214 OFFICE O/P EST MOD 30 MIN: CPT | Performed by: FAMILY MEDICINE

## 2023-05-19 PROCEDURE — 1123F ACP DISCUSS/DSCN MKR DOCD: CPT | Performed by: FAMILY MEDICINE

## 2023-05-19 PROCEDURE — 97530 THERAPEUTIC ACTIVITIES: CPT

## 2023-05-19 PROCEDURE — 3078F DIAST BP <80 MM HG: CPT | Performed by: FAMILY MEDICINE

## 2023-05-19 RX ORDER — FAMOTIDINE 20 MG/1
20 TABLET, FILM COATED ORAL 2 TIMES DAILY PRN
Qty: 180 TABLET | Refills: 1 | Status: SHIPPED | OUTPATIENT
Start: 2023-05-19

## 2023-05-19 RX ORDER — CETIRIZINE HYDROCHLORIDE 5 MG/1
5 TABLET ORAL PRN
Qty: 90 TABLET | Refills: 1 | Status: SHIPPED | OUTPATIENT
Start: 2023-05-19

## 2023-05-19 ASSESSMENT — PATIENT HEALTH QUESTIONNAIRE - PHQ9
SUM OF ALL RESPONSES TO PHQ QUESTIONS 1-9: 0
SUM OF ALL RESPONSES TO PHQ QUESTIONS 1-9: 0
1. LITTLE INTEREST OR PLEASURE IN DOING THINGS: 0
SUM OF ALL RESPONSES TO PHQ QUESTIONS 1-9: 0
2. FEELING DOWN, DEPRESSED OR HOPELESS: 0
SUM OF ALL RESPONSES TO PHQ9 QUESTIONS 1 & 2: 0
SUM OF ALL RESPONSES TO PHQ QUESTIONS 1-9: 0

## 2023-05-19 NOTE — FLOWSHEET NOTE
[address] [place] [address]  Phone: [phone]   Fax: [phone]    Physical Therapy Daily Treatment Note    Date:  2023     Patient Name:  Gucci Acevedo    :  1940  MRN: [id number]  Medical Diagnosis:  Unspecified rotator cuff tear or rupture of right shoulder, not specified as traumatic [M75.101]  Treatment Diagnosis: impaired posture, decreased R GH AROM and PROM, decreased R UE strength    Insurance/Certification information:   [facility] Medicare advantage  Physician Information:  Gilbert Sherwood MD    Plan of care signed (Y/N): []  Yes [x]  No     Date of Patient follow up with Physician:      Progress Report: []  Yes  [x]  No     Date Range for reporting period:  Beginnin2023  Ending:     Progress report due (10 Rx/or 30 days whichever is less): visit #10 or 3/9/12    Recertification due (POC duration/ or 90 days whichever is less): visit #12 or 23    Visit # Insurance Allowable Auth required? Date Range   3/12 [place] []  Yes  [x]  No NA       Latex Allergy:  [x]NO      []YES  Preferred Language for Healthcare:   [x]English       []other:    Functional Scale:       Date assessed:  QDASH: raw score = 37; dysfunction = 59%  23    Pain level:  3-5/10     SUBJECTIVE:  Pt reports that she is doing well. Pain is 2/10 but still avoids the motions that increase pain.      OBJECTIVE: See eval  Observation:   Test measurements:    :    PROM AROM     L R L R   Cervical Flexion            Cervical Extension            Cervical Rotation           Cervical Side-bend           Shoulder Flexion        130 deg with pain lowering   Shoulder Abduction        90 deg with pain lowering   Shoulder External Rotation           Shoulder Internal Rotation               RESTRICTIONS/PRECAUTIONS: none    Exercises/Interventions:   Therapeutic Exercise (01711) Resistance / level Sets / Seconds  Reps Notes/Cues   Pulleys Flexion  abduction 2'  2'     SB AAROM A/P,

## 2023-05-19 NOTE — PROGRESS NOTES
Supple without adenopathy. CV:  Regular rate and rhythm, S1 and S2 normal, no murmurs, clicks  PULM:  Chest is clear, no wheezing ,  symmetric air entry throughout both lung fields. ABD: Soft, NT  EXT: No rash or edema  NEURO: Alert oriented ×3, nonfocal     ASSESSMENT/PLAN:  1. Essential hypertension  Stable, continue Benicar 40 mg daily  Check electrolytes  - Comprehensive Metabolic Panel; Future    2. Mixed hyperlipidemia  Trial of Livalo 1 mg start 3 times a week and increase weekly by a dose  Recheck lipids 3 months    3. Prediabetes  Has been dieting and exercising and doing therapy  Recheck A1c  - Hemoglobin A1C; Future    4. Stage 3a chronic kidney disease (HCC)  Follow-up, screen    5. Gastroesophageal reflux disease without esophagitis  Takes as needed, refill, working  - famotidine (PEPCID) 20 MG tablet; Take 1 tablet by mouth 2 times daily as needed (reflux)  Dispense: 180 tablet; Refill: 1    6. Breast cancer screening by mammogram  screen  - SHIRA DIGITAL SCREEN W OR WO CAD BILATERAL;  Future      30 Total Minutes spent pre charting (reviewing problem list, meds, any test results, consultant and hospital notes ) and  obtaining present visit history, performing appropriate medical exam/evaluation, counseling and educating the patient (and family), ordering medications ,tests, and procedures as needed, refilling medication(s), placing referral(s) when needed in addition to coordinating care for this patient and documenting in electronic health record

## 2023-05-21 LAB
EST. AVERAGE GLUCOSE BLD GHB EST-MCNC: 125.5 MG/DL
HBA1C MFR BLD: 6 %

## 2023-05-24 ENCOUNTER — HOSPITAL ENCOUNTER (OUTPATIENT)
Dept: PHYSICAL THERAPY | Age: 83
Setting detail: THERAPIES SERIES
Discharge: HOME | End: 2023-05-24
Payer: MEDICARE

## 2023-05-24 PROCEDURE — 97140 MANUAL THERAPY 1/> REGIONS: CPT

## 2023-05-24 PROCEDURE — 97110 THERAPEUTIC EXERCISES: CPT

## 2023-05-24 NOTE — FLOWSHEET NOTE
[address] [place] [address]  Phone: [phone]   Fax: [phone]    Physical Therapy Daily Treatment Note    Date:  2023     Patient Name:  Carli Villafana    :  1940  MRN: [id number]  Medical Diagnosis:  Unspecified rotator cuff tear or rupture of right shoulder, not specified as traumatic [M75.101]  Treatment Diagnosis: impaired posture, decreased R GH AROM and PROM, decreased R UE strength    Insurance/Certification information:   [facility] Medicare advantage  Physician Information:  Julio Lucero MD    Plan of care signed (Y/N): []  Yes [x]  No     Date of Patient follow up with Physician:      Progress Report: []  Yes  [x]  No     Date Range for reporting period:  Beginnin2023  Ending:     Progress report due (10 Rx/or 30 days whichever is less): visit #10 or 58    Recertification due (POC duration/ or 90 days whichever is less): visit #12 or 23    Visit # Insurance Allowable Auth required? Date Range    [place] []  Yes  [x]  No NA       Latex Allergy:  [x]NO      []YES  Preferred Language for Healthcare:   [x]English       []other:    Functional Scale:       Date assessed:  QDASH: raw score = 37; dysfunction = 59%  23    Pain level:  3-5/10     SUBJECTIVE:  Pain is 1.5/10 currently, pain continues to fluctuate. Just got done with a [employer] class. HEP is going well.      *will be out all of : will call to schedule when she gets back if needed*    OBJECTIVE: See eval  Observation:   Test measurements:    :    AROM     L R   Shoulder Flexion    130 deg with pain lowering   Shoulder Abduction    130 deg with pain lowering     :    AROM     L R   Shoulder Flexion    130 deg with pain lowering   Shoulder Abduction    90 deg with pain lowering       RESTRICTIONS/PRECAUTIONS: none    Exercises/Interventions:   Therapeutic Exercise (98741) Resistance / level Sets / Seconds  Reps Notes/Cues   Pulleys Flexion  abduction 2'  2'

## 2023-05-25 ENCOUNTER — TELEPHONE (OUTPATIENT)
Dept: FAMILY MEDICINE CLINIC | Age: 83
End: 2023-05-25

## 2023-05-25 NOTE — TELEPHONE ENCOUNTER
Pt did call back and received the lab work results from 5/19/2023.  Pt will call back to schedule follow up

## 2023-05-26 ENCOUNTER — HOSPITAL ENCOUNTER (OUTPATIENT)
Dept: PHYSICAL THERAPY | Age: 83
Setting detail: THERAPIES SERIES
Discharge: HOME | End: 2023-05-26
Payer: MEDICARE

## 2023-05-26 PROCEDURE — 97110 THERAPEUTIC EXERCISES: CPT

## 2023-05-26 PROCEDURE — 97112 NEUROMUSCULAR REEDUCATION: CPT

## 2023-05-26 PROCEDURE — 97530 THERAPEUTIC ACTIVITIES: CPT

## 2023-05-26 NOTE — FLOWSHEET NOTE
Veena Smith  Phone: [phone]   Fax: [phone]    Physical Therapy Re-Certification Plan of Care    Dear Amarilys Anguiano MD  ,    We had the pleasure of treating the following patient for physical therapy services at [facility]. A summary of our findings can be found in the updated assessment below. This includes our plan of care. If you have any questions or concerns regarding these findings, please do not hesitate to contact me at the office phone number checked above. Thank you for the referral.     Physician Signature:________________________________Date:__________________  By signing above (or electronic signature), therapist's plan is approved by physician      Functional Outcome:     QDASH: raw score = 16; dysfunction = 9.1%  5/26/23    Strength (0-5) Right    Shoulder Flex  4-   Shoulder scaption  4   Shoulder ER  4-   Shoulder IR  4   Biceps (C6)  4   Triceps (C7)  4+       AROM     L R   Shoulder Flexion    130 deg with pain lowering   Shoulder Abduction    130 deg with pain lowering       Overall Response to Treatment:   [x]Patient is responding well to treatment and improvement is noted with regards  to goals   [x]Patient should continue to improve in reasonable time if they continue HEP   []Patient has plateaued and is no longer responding to skilled PT intervention    []Patient is getting worse and would benefit from return to referring MD   []Patient unable to adhere to initial POC   [x]Other: Pt has been seen for 5 session during which time her strength and ROM have significantly improved. Pt is now put on hold as she will be traveling for the next month. Pt has an HEP progression to continued progress. PT anticipate that pt will continued to do well and may not require additional therapy.   If pt does require therapy when she returns pt would benefit from skilled physical therapy to

## 2023-06-02 RX ORDER — HYDROCORTISONE 25 MG/G
CREAM TOPICAL
Qty: 28 G | Refills: 3 | Status: SHIPPED | OUTPATIENT
Start: 2023-06-02

## 2023-06-02 NOTE — TELEPHONE ENCOUNTER
Medication and Quantity requested: hydrocortisone (ANUSOL-HC) 2.5 % CREA rectal cream       Last Visit  5.19.23    Pharmacy and phone number updated in [facility]:  yes

## 2023-06-16 NOTE — TELEPHONE ENCOUNTER
Medication:   Requested Prescriptions     Pending Prescriptions Disp Refills    cetirizine (ZYRTEC) 5 MG tablet [Pharmacy Med Name: CETIRIZINE 5MG TABLETS] 90 tablet 1     Sig: TAKE 1 TABLET BY MOUTH EVERY DAY. Last Filled:  5/19/2023, 90, 1 (needs it sent to [facility] not [facility])    Patient Phone Number: [phone] (home)     Last appt: 5/19/2023   Next appt: Visit date not found    Last OARRS: No flowsheet data found.

## 2023-06-18 RX ORDER — CETIRIZINE HYDROCHLORIDE 5 MG/1
TABLET ORAL
Qty: 90 TABLET | Refills: 1 | OUTPATIENT
Start: 2023-06-18

## 2023-07-04 DIAGNOSIS — I10 ESSENTIAL HYPERTENSION: Primary | ICD-10-CM

## 2023-07-05 RX ORDER — OLMESARTAN MEDOXOMIL 40 MG/1
TABLET ORAL
Qty: 90 TABLET | Refills: 0 | Status: SHIPPED | OUTPATIENT
Start: 2023-07-05

## 2023-07-05 NOTE — TELEPHONE ENCOUNTER
Medication:   Requested Prescriptions     Pending Prescriptions Disp Refills    olmesartan (BENICAR) 40 MG tablet [Pharmacy Med Name: Olmesartan Medoxomil 40 MG Oral Tablet] 90 tablet 3     Sig: TAKE 1 TABLET DAILY       Last appt: 5/19/2023   Next appt: Visit date not found    Lab Results   Component Value Date     05/19/2023    K 4.6 05/19/2023     05/19/2023    CO2 25 05/19/2023    BUN 13 05/19/2023    CREATININE 1.1 05/19/2023    GLUCOSE 91 05/19/2023    CALCIUM 9.0 05/19/2023    PROT 6.8 05/19/2023    LABALBU 4.0 05/19/2023    BILITOT 0.5 05/19/2023    ALKPHOS 70 05/19/2023    AST 15 05/19/2023    ALT 12 05/19/2023    LABGLOM 50 (A) 05/19/2023    GFRAA 58 (A) 10/10/2022    AGRATIO 1.4 05/19/2023    GLOB 3.5 10/08/2021

## 2023-07-17 DIAGNOSIS — N18.31 STAGE 3A CHRONIC KIDNEY DISEASE (HCC): ICD-10-CM

## 2023-07-17 LAB
ALBUMIN SERPL-MCNC: 4 G/DL (ref 3.4–5)
ALBUMIN/GLOB SERPL: 1.3 {RATIO} (ref 1.1–2.2)
ALP SERPL-CCNC: 82 U/L (ref 40–129)
ALT SERPL-CCNC: 13 U/L (ref 10–40)
ANION GAP SERPL CALCULATED.3IONS-SCNC: 12 MMOL/L (ref 3–16)
AST SERPL-CCNC: 19 U/L (ref 15–37)
BASOPHILS # BLD: 0 K/UL (ref 0–0.2)
BASOPHILS NFR BLD: 0.6 %
BILIRUB SERPL-MCNC: 0.9 MG/DL (ref 0–1)
BUN SERPL-MCNC: 11 MG/DL (ref 7–20)
CALCIUM SERPL-MCNC: 9.1 MG/DL (ref 8.3–10.6)
CHLORIDE SERPL-SCNC: 105 MMOL/L (ref 99–110)
CO2 SERPL-SCNC: 24 MMOL/L (ref 21–32)
CREAT SERPL-MCNC: 1 MG/DL (ref 0.6–1.2)
DEPRECATED RDW RBC AUTO: 14.6 % (ref 12.4–15.4)
EOSINOPHIL # BLD: 0.3 K/UL (ref 0–0.6)
EOSINOPHIL NFR BLD: 5.1 %
GFR SERPLBLD CREATININE-BSD FMLA CKD-EPI: 56 ML/MIN/{1.73_M2}
GLUCOSE SERPL-MCNC: 83 MG/DL (ref 70–99)
HCT VFR BLD AUTO: 37.9 % (ref 36–48)
HGB BLD-MCNC: 12.4 G/DL (ref 12–16)
LYMPHOCYTES # BLD: 3 K/UL (ref 1–5.1)
LYMPHOCYTES NFR BLD: 44.8 %
MCH RBC QN AUTO: 27.4 PG (ref 26–34)
MCHC RBC AUTO-ENTMCNC: 32.7 G/DL (ref 31–36)
MCV RBC AUTO: 83.9 FL (ref 80–100)
MONOCYTES # BLD: 0.5 K/UL (ref 0–1.3)
MONOCYTES NFR BLD: 6.7 %
NEUTROPHILS # BLD: 2.9 K/UL (ref 1.7–7.7)
NEUTROPHILS NFR BLD: 42.8 %
PLATELET # BLD AUTO: 320 K/UL (ref 135–450)
PMV BLD AUTO: 8.2 FL (ref 5–10.5)
POTASSIUM SERPL-SCNC: 4.5 MMOL/L (ref 3.5–5.1)
PROT SERPL-MCNC: 7 G/DL (ref 6.4–8.2)
RBC # BLD AUTO: 4.51 M/UL (ref 4–5.2)
SODIUM SERPL-SCNC: 141 MMOL/L (ref 136–145)
WBC # BLD AUTO: 6.7 K/UL (ref 4–11)

## 2023-08-22 ENCOUNTER — TELEPHONE (OUTPATIENT)
Dept: ENDOCRINOLOGY | Age: 83
End: 2023-08-22

## 2023-08-22 DIAGNOSIS — E04.2 MULTIPLE THYROID NODULES: ICD-10-CM

## 2023-08-22 DIAGNOSIS — E04.1 THYROID NODULE: Primary | ICD-10-CM

## 2023-08-22 NOTE — TELEPHONE ENCOUNTER
Pt calling and asking if she should cancel her appt next week because she is having the US done the day before and does not know if you will have results back in time? Pt also does not have recent lab orders in system. Will she need to do labs before appt?  Dr Noemi Ponce does not prescribe meds for pt    # [phone]

## 2023-08-23 NOTE — TELEPHONE ENCOUNTER
Spoke to patient and offered an October appointment. She opted to get blood work done and keep her appt next week. She will review her lab results at visit and then wait to hear about ultrasound via phone or mychart. 30-Oct-2021 20:21

## 2023-08-23 NOTE — TELEPHONE ENCOUNTER
Is it possible for her to do the ultrasound sooner otherwise the next available might be a little far out but if there is another appointment available in the next week or 2 after her imaging then she can reschedule

## 2023-08-28 ENCOUNTER — HOSPITAL ENCOUNTER (OUTPATIENT)
Dept: ULTRASOUND IMAGING | Age: 83
Discharge: HOME | End: 2023-08-28
Attending: INTERNAL MEDICINE
Payer: MEDICARE

## 2023-08-28 ENCOUNTER — HOSPITAL ENCOUNTER (OUTPATIENT)
Age: 83
Discharge: HOME | End: 2023-08-28
Attending: INTERNAL MEDICINE
Payer: MEDICARE

## 2023-08-28 DIAGNOSIS — E04.2 MULTIPLE THYROID NODULES: ICD-10-CM

## 2023-08-28 LAB
CHOLEST SERPL-MCNC: 245 MG/DL (ref 0–199)
HDLC SERPL-MCNC: 80 MG/DL (ref 40–60)
LDLC SERPL CALC-MCNC: 157 MG/DL
T4 FREE SERPL-MCNC: 1.1 NG/DL (ref 0.9–1.8)
TRIGL SERPL-MCNC: 39 MG/DL (ref 0–150)
TSH SERPL DL<=0.005 MIU/L-ACNC: 1.39 UIU/ML (ref 0.27–4.2)
VLDLC SERPL CALC-MCNC: 8 MG/DL

## 2023-08-28 PROCEDURE — 84443 ASSAY THYROID STIM HORMONE: CPT

## 2023-08-28 PROCEDURE — 76536 US EXAM OF HEAD AND NECK: CPT

## 2023-08-28 PROCEDURE — 36415 COLL VENOUS BLD VENIPUNCTURE: CPT

## 2023-08-28 PROCEDURE — 84439 ASSAY OF FREE THYROXINE: CPT

## 2023-08-28 PROCEDURE — 80061 LIPID PANEL: CPT

## 2023-08-29 ENCOUNTER — OFFICE VISIT (OUTPATIENT)
Dept: ENDOCRINOLOGY | Age: 83
End: 2023-08-29
Payer: MEDICARE

## 2023-08-29 VITALS
DIASTOLIC BLOOD PRESSURE: 77 MMHG | TEMPERATURE: 98 F | HEIGHT: 62 IN | HEART RATE: 65 BPM | BODY MASS INDEX: 34.04 KG/M2 | SYSTOLIC BLOOD PRESSURE: 130 MMHG | RESPIRATION RATE: 14 BRPM | WEIGHT: 185 LBS

## 2023-08-29 DIAGNOSIS — E04.2 MULTIPLE THYROID NODULES: Primary | ICD-10-CM

## 2023-08-29 PROCEDURE — 99213 OFFICE O/P EST LOW 20 MIN: CPT | Performed by: INTERNAL MEDICINE

## 2023-08-29 PROCEDURE — 3078F DIAST BP <80 MM HG: CPT | Performed by: INTERNAL MEDICINE

## 2023-08-29 PROCEDURE — 3075F SYST BP GE 130 - 139MM HG: CPT | Performed by: INTERNAL MEDICINE

## 2023-08-29 PROCEDURE — 1123F ACP DISCUSS/DSCN MKR DOCD: CPT | Performed by: INTERNAL MEDICINE

## 2023-08-29 NOTE — PROGRESS NOTES
into the lungs every 6 hours as needed for Wheezing 18 g 3    triamcinolone (KENALOG) 0.1 % ointment APPLY TOPICALLY TWICE A DAY AS NEEDED FOR ITCHING. USE SPARINGLY 80 g 8    spironolactone (ALDACTONE) 25 MG tablet TAKE 1 TABLET DAILY (Patient taking differently: as needed TAKE 1 TABLET DAILY) 90 tablet 3    ketoconazole (NIZORAL) 2 % cream APPLY TOPICALLY UNDER BREAST OR IN GROIN TWICE A DAY AS NEEDED FOR RASH 60 g 11    fluticasone (FLONASE) 50 MCG/ACT nasal spray 2 sprays by Each Nostril route daily (Patient taking differently: 2 sprays by Each Nostril route as needed) 16 g 5    Nutritional Supplements (JUICE PLUS FIBRE PO) Take by mouth      Ascorbic Acid (VITAMIN C) 250 MG tablet Take 1 tablet by mouth daily      Cholecalciferol (VITAMIN D3) 50 MCG (2000 UT) CAPS Take 1 capsule by mouth daily If not covered , will buy over the counter. (Patient taking differently: Take 1 capsule by mouth as needed If not covered , will buy over the counter.) 90 capsule 0    multivitamin (THERAGRAN) per tablet Take 1 tablet by mouth daily       No current facility-administered medications for this visit. Allergies   Allergen Reactions    Latex Itching    Crestor [Rosuvastatin Calcium] Other (See Comments)     Muscle pain    Pcn [Penicillins] Anaphylaxis    Clindamycin Hcl      Other reaction(s): GI Upset    Eggs [Egg White]     Flagyl [Metronidazole] Diarrhea     Blood in stool    Haemophilus B Polysaccharide Vaccine      Made ill in the past    Pt notes she is allergic to eggs     Lipitor      Muscle pain. Motrin [Ibuprofen Micronized] Other (See Comments)     Head tightness    Nsaids     Sulfa Antibiotics      hypertension    Aspirin Nausea And Vomiting    Imidazole Antifungals Nausea And Vomiting and Other (See Comments)    Quinolones Nausea And Vomiting         Review of Systems:  I have reviewed the review of system questionnaire filled by the patient .   Patient was advised to contact PCP for non endocrine signs and

## 2023-09-08 ENCOUNTER — NURSE TRIAGE (OUTPATIENT)
Dept: OTHER | Facility: CLINIC | Age: 83
End: 2023-09-08

## 2023-09-08 NOTE — TELEPHONE ENCOUNTER
Location of patient: [place]    Received call from [facility]; Patient with Red Flag Complaint requesting to establish care with Conner. Subjective: Caller states \"Diverticulitis flare up. Diagnosed 5 years ago. \"     Current Symptoms: gas, bloating, diarrhea, pain lower left side of abdomen, rash from left forearm extending across back of neck     Onset: Rash started 3--4 weeks ago, abdominal pain started     Pain Severity: 3/10    Temperature: denies at this time    What has been tried: Pepto bismol, Pepcid    Recommended disposition: See in Office Today    Care advice provided, patient verbalizes understanding; denies any other questions or concerns; instructed to call back for any new or worsening symptoms. Patient/Caller agrees with recommended disposition; writer provided warm transfer to [facility] [employer] [facility] at [facility] for appointment scheduling    Attention Provider: Thank you for allowing me to participate in the care of your patient. The patient was connected to triage in response to information provided to the ECC. Please do not respond through this encounter as the response is not directed to a shared pool.     Reason for Disposition   Age > 60 years    Protocols used: Abdominal Pain - Female-ADULT-[place]

## 2023-09-11 ENCOUNTER — OFFICE VISIT (OUTPATIENT)
Dept: INTERNAL MEDICINE CLINIC | Age: 83
End: 2023-09-11
Payer: MEDICARE

## 2023-09-11 VITALS
BODY MASS INDEX: 33.09 KG/M2 | HEART RATE: 81 BPM | WEIGHT: 179.8 LBS | HEIGHT: 62 IN | OXYGEN SATURATION: 98 % | DIASTOLIC BLOOD PRESSURE: 70 MMHG | SYSTOLIC BLOOD PRESSURE: 114 MMHG

## 2023-09-11 DIAGNOSIS — E55.9 VITAMIN D DEFICIENCY: ICD-10-CM

## 2023-09-11 DIAGNOSIS — I10 ESSENTIAL HYPERTENSION: Primary | ICD-10-CM

## 2023-09-11 DIAGNOSIS — K21.9 GASTROESOPHAGEAL REFLUX DISEASE WITHOUT ESOPHAGITIS: ICD-10-CM

## 2023-09-11 DIAGNOSIS — J45.41 MODERATE PERSISTENT ASTHMA WITH EXACERBATION: ICD-10-CM

## 2023-09-11 DIAGNOSIS — N18.31 STAGE 3A CHRONIC KIDNEY DISEASE (HCC): ICD-10-CM

## 2023-09-11 DIAGNOSIS — R73.03 PREDIABETES: ICD-10-CM

## 2023-09-11 DIAGNOSIS — Z86.010 PERSONAL HISTORY OF COLONIC POLYPS: ICD-10-CM

## 2023-09-11 DIAGNOSIS — E78.2 MIXED HYPERLIPIDEMIA: ICD-10-CM

## 2023-09-11 PROCEDURE — 3074F SYST BP LT 130 MM HG: CPT | Performed by: INTERNAL MEDICINE

## 2023-09-11 PROCEDURE — 99214 OFFICE O/P EST MOD 30 MIN: CPT | Performed by: INTERNAL MEDICINE

## 2023-09-11 PROCEDURE — 3078F DIAST BP <80 MM HG: CPT | Performed by: INTERNAL MEDICINE

## 2023-09-11 PROCEDURE — 1123F ACP DISCUSS/DSCN MKR DOCD: CPT | Performed by: INTERNAL MEDICINE

## 2023-09-11 RX ORDER — HYDROCORTISONE 25 MG/G
CREAM TOPICAL
Qty: 28 G | Refills: 3 | Status: SHIPPED | OUTPATIENT
Start: 2023-09-11

## 2023-09-11 RX ORDER — OLMESARTAN MEDOXOMIL 40 MG/1
40 TABLET ORAL DAILY
Qty: 90 TABLET | Refills: 1 | Status: SHIPPED | OUTPATIENT
Start: 2023-09-11

## 2023-09-11 RX ORDER — FAMOTIDINE 20 MG/1
20 TABLET, FILM COATED ORAL 2 TIMES DAILY PRN
Qty: 180 TABLET | Refills: 1 | Status: SHIPPED | OUTPATIENT
Start: 2023-09-11

## 2023-09-11 SDOH — HEALTH STABILITY: PHYSICAL HEALTH: ON AVERAGE, HOW MANY DAYS PER WEEK DO YOU ENGAGE IN MODERATE TO STRENUOUS EXERCISE (LIKE A BRISK WALK)?: 2 DAYS

## 2023-09-11 SDOH — HEALTH STABILITY: PHYSICAL HEALTH: ON AVERAGE, HOW MANY MINUTES DO YOU ENGAGE IN EXERCISE AT THIS LEVEL?: 30 MIN

## 2023-09-11 ASSESSMENT — ENCOUNTER SYMPTOMS
COLOR CHANGE: 0
BLOOD IN STOOL: 0
SHORTNESS OF BREATH: 0
ABDOMINAL PAIN: 0
SINUS PAIN: 0
WHEEZING: 0
CONSTIPATION: 0
COUGH: 0
CHEST TIGHTNESS: 0

## 2023-09-11 NOTE — PROGRESS NOTES
Della Hernandez (:  1940) is a 81 y.o. female,New patient, here for evaluation of the following chief complaint(s):  Established New Doctor and Follow-up (ED 9/10)         ASSESSMENT/PLAN:  1. Essential hypertension  -     CBC with Auto Differential; Future  -     Comprehensive Metabolic Panel; Future  -     olmesartan (BENICAR) 40 MG tablet; Take 1 tablet by mouth daily, Disp-90 tablet, R-1Requesting 1 year supplyNormal  2. Prediabetes  -     Hemoglobin A1C; Future  3. Mixed hyperlipidemia  -     Comprehensive Metabolic Panel; Future  -     Lipid Panel; Future  4. Stage 3a chronic kidney disease ([address])  5. Moderate persistent asthma with exacerbation  6. Vitamin D deficiency  -     Vitamin D 25 Hydroxy; Future  7. Personal history of colonic polyps  -     AFL - Adwoa Castro MD, Gastroenterology, [place]  8. Gastroesophageal reflux disease without esophagitis  -     famotidine (PEPCID) 20 MG tablet;  Take 1 tablet by mouth 2 times daily as needed (reflux), Disp-180 tablet, R-1Normal  Medical issues the patient blood pressure is well controlled she is to continue the same plan of care patient is advised to stay very active and to exercise on regular basis to the tone of 150 minutes/week in 10-minute increments of mild to moderate exercise like brisk walking    Patient does have chronic kidney disease stage III she had blood work done yesterday in the emergency room and it is staying stable    Patient has prediabetes I think she needs to have that rechecked we will have her do it prior to her next visit    Patient is having high cholesterol it is a mix of high good cholesterol and high bad cholesterol given the patient family history and her multiple intolerance to different agents in the past I think that lifestyle changes as well as over-the-counter medications like red yeast rice extract since would be a very good supplements but mostly exercise would be the best in this situation    We

## 2023-09-15 SDOH — HEALTH STABILITY: PHYSICAL HEALTH: ON AVERAGE, HOW MANY DAYS PER WEEK DO YOU ENGAGE IN MODERATE TO STRENUOUS EXERCISE (LIKE A BRISK WALK)?: 3 DAYS

## 2023-09-15 SDOH — HEALTH STABILITY: PHYSICAL HEALTH: ON AVERAGE, HOW MANY MINUTES DO YOU ENGAGE IN EXERCISE AT THIS LEVEL?: 40 MIN

## 2023-09-18 ENCOUNTER — OFFICE VISIT (OUTPATIENT)
Dept: INTERNAL MEDICINE CLINIC | Age: 83
End: 2023-09-18
Payer: MEDICARE

## 2023-09-18 VITALS
SYSTOLIC BLOOD PRESSURE: 138 MMHG | WEIGHT: 168 LBS | DIASTOLIC BLOOD PRESSURE: 80 MMHG | OXYGEN SATURATION: 98 % | HEART RATE: 78 BPM | BODY MASS INDEX: 30.73 KG/M2

## 2023-09-18 DIAGNOSIS — R19.7 DIARRHEA, UNSPECIFIED TYPE: Primary | ICD-10-CM

## 2023-09-18 PROCEDURE — 99213 OFFICE O/P EST LOW 20 MIN: CPT | Performed by: STUDENT IN AN ORGANIZED HEALTH CARE EDUCATION/TRAINING PROGRAM

## 2023-09-18 PROCEDURE — 3079F DIAST BP 80-89 MM HG: CPT | Performed by: STUDENT IN AN ORGANIZED HEALTH CARE EDUCATION/TRAINING PROGRAM

## 2023-09-18 PROCEDURE — 1123F ACP DISCUSS/DSCN MKR DOCD: CPT | Performed by: STUDENT IN AN ORGANIZED HEALTH CARE EDUCATION/TRAINING PROGRAM

## 2023-09-18 PROCEDURE — 3075F SYST BP GE 130 - 139MM HG: CPT | Performed by: STUDENT IN AN ORGANIZED HEALTH CARE EDUCATION/TRAINING PROGRAM

## 2023-09-18 RX ORDER — LOPERAMIDE HYDROCHLORIDE 2 MG/1
2 CAPSULE ORAL 2 TIMES DAILY
Qty: 4 CAPSULE | Refills: 0 | Status: SHIPPED | OUTPATIENT
Start: 2023-09-18 | End: 2023-09-20

## 2023-09-18 RX ORDER — DIAPER,BRIEF,INFANT-TODD,DISP
EACH MISCELLANEOUS
Qty: 30 G | Refills: 1 | Status: SHIPPED | OUTPATIENT
Start: 2023-09-18 | End: 2023-09-18

## 2023-09-18 ASSESSMENT — PATIENT HEALTH QUESTIONNAIRE - PHQ9
6. FEELING BAD ABOUT YOURSELF - OR THAT YOU ARE A FAILURE OR HAVE LET YOURSELF OR YOUR FAMILY DOWN: 0
10. IF YOU CHECKED OFF ANY PROBLEMS, HOW DIFFICULT HAVE THESE PROBLEMS MADE IT FOR YOU TO DO YOUR WORK, TAKE CARE OF THINGS AT HOME, OR GET ALONG WITH OTHER PEOPLE: 2
SUM OF ALL RESPONSES TO PHQ9 QUESTIONS 1 & 2: 0
8. MOVING OR SPEAKING SO SLOWLY THAT OTHER PEOPLE COULD HAVE NOTICED. OR THE OPPOSITE, BEING SO FIGETY OR RESTLESS THAT YOU HAVE BEEN MOVING AROUND A LOT MORE THAN USUAL: 0
SUM OF ALL RESPONSES TO PHQ QUESTIONS 1-9: 3
3. TROUBLE FALLING OR STAYING ASLEEP: 0
1. LITTLE INTEREST OR PLEASURE IN DOING THINGS: 0
9. THOUGHTS THAT YOU WOULD BE BETTER OFF DEAD, OR OF HURTING YOURSELF: 0
SUM OF ALL RESPONSES TO PHQ QUESTIONS 1-9: 3
4. FEELING TIRED OR HAVING LITTLE ENERGY: 0
2. FEELING DOWN, DEPRESSED OR HOPELESS: 0
SUM OF ALL RESPONSES TO PHQ QUESTIONS 1-9: 3
SUM OF ALL RESPONSES TO PHQ QUESTIONS 1-9: 3
5. POOR APPETITE OR OVEREATING: 3
7. TROUBLE CONCENTRATING ON THINGS, SUCH AS READING THE NEWSPAPER OR WATCHING TELEVISION: 0

## 2023-09-18 ASSESSMENT — ANXIETY QUESTIONNAIRES
4. TROUBLE RELAXING: 0
3. WORRYING TOO MUCH ABOUT DIFFERENT THINGS: 0
IF YOU CHECKED OFF ANY PROBLEMS ON THIS QUESTIONNAIRE, HOW DIFFICULT HAVE THESE PROBLEMS MADE IT FOR YOU TO DO YOUR WORK, TAKE CARE OF THINGS AT HOME, OR GET ALONG WITH OTHER PEOPLE: NOT DIFFICULT AT ALL
GAD7 TOTAL SCORE: 0
5. BEING SO RESTLESS THAT IT IS HARD TO SIT STILL: 0
1. FEELING NERVOUS, ANXIOUS, OR ON EDGE: 0
7. FEELING AFRAID AS IF SOMETHING AWFUL MIGHT HAPPEN: 0
6. BECOMING EASILY ANNOYED OR IRRITABLE: 0
2. NOT BEING ABLE TO STOP OR CONTROL WORRYING: 0

## 2023-09-18 NOTE — PROGRESS NOTES
Della Hernandez (:  1940) is a 81 y.o. female,Established patient, here for evaluation of the following chief complaint(s):  Established New Doctor and Diarrhea (W/ abdominal pain X 1 month)         ASSESSMENT/PLAN:  1. Diarrhea, unspecified type  -Chronic history of alternating constipation and diarrhea; IBS?  -Follows with GI; next appointment in 3 days; will follow their lead  - BRAT diet/bowel rest, discontinue prebiotic's and supplements  -We will consider stool studies in future; may be inflammatory in nature. Low suspicion for infectious etiology  -No concern for dehydration at this time; continue with hydration  -CT abdomen in ED without explanation of pain; consider CTA abdomen to rule out mesenteric ischemia if appropriate  - loperamide (RA ANTI-DIARRHEAL) 2 MG capsule; Take 1 capsule by mouth in the morning and at bedtime for 2 days  Dispense: 4 capsule; Refill: 0    Return in about 1 week (around 2023) for Diarrhea. PHQ-9 Total Score: 3 (2023  2:56 PM)  Thoughts that you would be better off dead, or of hurting yourself in some way: 0 (2023  2:56 PM)     The ASCVD Risk score (Priya DK, et al., 2019) failed to calculate for the following reasons: The 2019 ASCVD risk score is only valid for ages 36 to 78      Subjective   SUBJECTIVE/OBJECTIVE:  HPI:   Patient was seen in the emergency department for abdominal pain, loose stools and constipation  History of duodenal ulcer  CT abdomen in the ED revealed colonic diverticulosis without diverticulitis  There was mention of hepatic cysts; chronic and stable  Bilateral renal calculi no hydronephrosis  Scattered atherosclerotic plaque without aneurysmal dilatation of the abdominal aorta.      Diarrhea started  after egg salad consumption  Got diarrhea; stopped miralax  Changed diet to bulk up stool  Restarted miralax; has been on miralax for years due to chronic constipation  Chronically alternates between

## 2023-09-18 NOTE — PATIENT INSTRUCTIONS
Thank you for allowing me to care for you!     Patient instructions:  Stop miralax and supplements   Imodium x 1-2 days   Follow up with GI

## 2023-09-26 ENCOUNTER — OFFICE VISIT (OUTPATIENT)
Dept: INTERNAL MEDICINE CLINIC | Age: 83
End: 2023-09-26
Payer: MEDICARE

## 2023-09-26 VITALS
DIASTOLIC BLOOD PRESSURE: 70 MMHG | HEART RATE: 99 BPM | OXYGEN SATURATION: 99 % | BODY MASS INDEX: 32.19 KG/M2 | WEIGHT: 176 LBS | TEMPERATURE: 97.4 F | SYSTOLIC BLOOD PRESSURE: 126 MMHG

## 2023-09-26 DIAGNOSIS — K59.1 FUNCTIONAL DIARRHEA: Primary | ICD-10-CM

## 2023-09-26 PROCEDURE — 3078F DIAST BP <80 MM HG: CPT | Performed by: STUDENT IN AN ORGANIZED HEALTH CARE EDUCATION/TRAINING PROGRAM

## 2023-09-26 PROCEDURE — 99213 OFFICE O/P EST LOW 20 MIN: CPT | Performed by: STUDENT IN AN ORGANIZED HEALTH CARE EDUCATION/TRAINING PROGRAM

## 2023-09-26 PROCEDURE — 3074F SYST BP LT 130 MM HG: CPT | Performed by: STUDENT IN AN ORGANIZED HEALTH CARE EDUCATION/TRAINING PROGRAM

## 2023-09-26 PROCEDURE — 1123F ACP DISCUSS/DSCN MKR DOCD: CPT | Performed by: STUDENT IN AN ORGANIZED HEALTH CARE EDUCATION/TRAINING PROGRAM

## 2023-09-26 ASSESSMENT — PATIENT HEALTH QUESTIONNAIRE - PHQ9
SUM OF ALL RESPONSES TO PHQ9 QUESTIONS 1 & 2: 0
SUM OF ALL RESPONSES TO PHQ QUESTIONS 1-9: 0
2. FEELING DOWN, DEPRESSED OR HOPELESS: 0
1. LITTLE INTEREST OR PLEASURE IN DOING THINGS: 0
SUM OF ALL RESPONSES TO PHQ QUESTIONS 1-9: 0

## 2023-11-09 SDOH — HEALTH STABILITY: PHYSICAL HEALTH: ON AVERAGE, HOW MANY MINUTES DO YOU ENGAGE IN EXERCISE AT THIS LEVEL?: 40 MIN

## 2023-11-09 ASSESSMENT — LIFESTYLE VARIABLES
HOW OFTEN DO YOU HAVE A DRINK CONTAINING ALCOHOL: 1
HOW MANY STANDARD DRINKS CONTAINING ALCOHOL DO YOU HAVE ON A TYPICAL DAY: 0
HOW OFTEN DO YOU HAVE SIX OR MORE DRINKS ON ONE OCCASION: 1

## 2023-11-13 ENCOUNTER — OFFICE VISIT (OUTPATIENT)
Dept: INTERNAL MEDICINE CLINIC | Age: 83
End: 2023-11-13
Payer: MEDICARE

## 2023-11-13 VITALS
SYSTOLIC BLOOD PRESSURE: 122 MMHG | DIASTOLIC BLOOD PRESSURE: 70 MMHG | BODY MASS INDEX: 32.01 KG/M2 | OXYGEN SATURATION: 98 % | WEIGHT: 175 LBS | HEART RATE: 78 BPM

## 2023-11-13 DIAGNOSIS — Z00.00 MEDICARE ANNUAL WELLNESS VISIT, SUBSEQUENT: Primary | ICD-10-CM

## 2023-11-13 DIAGNOSIS — R23.8 SKIN IRRITATION: ICD-10-CM

## 2023-11-13 PROCEDURE — 99213 OFFICE O/P EST LOW 20 MIN: CPT | Performed by: STUDENT IN AN ORGANIZED HEALTH CARE EDUCATION/TRAINING PROGRAM

## 2023-11-13 PROCEDURE — G0439 PPPS, SUBSEQ VISIT: HCPCS | Performed by: STUDENT IN AN ORGANIZED HEALTH CARE EDUCATION/TRAINING PROGRAM

## 2023-11-13 PROCEDURE — 3074F SYST BP LT 130 MM HG: CPT | Performed by: STUDENT IN AN ORGANIZED HEALTH CARE EDUCATION/TRAINING PROGRAM

## 2023-11-13 PROCEDURE — 3078F DIAST BP <80 MM HG: CPT | Performed by: STUDENT IN AN ORGANIZED HEALTH CARE EDUCATION/TRAINING PROGRAM

## 2023-11-13 PROCEDURE — 1123F ACP DISCUSS/DSCN MKR DOCD: CPT | Performed by: STUDENT IN AN ORGANIZED HEALTH CARE EDUCATION/TRAINING PROGRAM

## 2023-11-13 RX ORDER — ZINC OXIDE/COD LIVER OIL 40 %
PASTE (GRAM) TOPICAL
Qty: 397 G | Refills: 0 | Status: SHIPPED | OUTPATIENT
Start: 2023-11-13

## 2023-11-13 SDOH — ECONOMIC STABILITY: FOOD INSECURITY: WITHIN THE PAST 12 MONTHS, YOU WORRIED THAT YOUR FOOD WOULD RUN OUT BEFORE YOU GOT MONEY TO BUY MORE.: NEVER TRUE

## 2023-11-13 SDOH — ECONOMIC STABILITY: INCOME INSECURITY: HOW HARD IS IT FOR YOU TO PAY FOR THE VERY BASICS LIKE FOOD, HOUSING, MEDICAL CARE, AND HEATING?: NOT HARD AT ALL

## 2023-11-13 SDOH — ECONOMIC STABILITY: FOOD INSECURITY: WITHIN THE PAST 12 MONTHS, THE FOOD YOU BOUGHT JUST DIDN'T LAST AND YOU DIDN'T HAVE MONEY TO GET MORE.: NEVER TRUE

## 2023-11-13 ASSESSMENT — PATIENT HEALTH QUESTIONNAIRE - PHQ9
SUM OF ALL RESPONSES TO PHQ QUESTIONS 1-9: 0
1. LITTLE INTEREST OR PLEASURE IN DOING THINGS: 0
SUM OF ALL RESPONSES TO PHQ9 QUESTIONS 1 & 2: 0
2. FEELING DOWN, DEPRESSED OR HOPELESS: 0
SUM OF ALL RESPONSES TO PHQ QUESTIONS 1-9: 0

## 2023-11-13 ASSESSMENT — LIFESTYLE VARIABLES
HOW MANY STANDARD DRINKS CONTAINING ALCOHOL DO YOU HAVE ON A TYPICAL DAY: PATIENT DOES NOT DRINK
HOW OFTEN DO YOU HAVE A DRINK CONTAINING ALCOHOL: NEVER

## 2023-11-13 NOTE — PATIENT INSTRUCTIONS
Thank you for allowing me to care for you! Patient instructions:       Preventing Falls: Care Instructions    Talk to your doctor about the medicines you take. Ask if any of them increase the risk of falls and whether they can be changed or stopped. Try to exercise regularly. It can help improve your strength and balance. This can help lower your risk of falling. Practice fall safety and prevention. Wear low-heeled shoes that fit well and give your feet good support. Talk to your doctor if you have foot problems that make this hard. Carry a cellphone or wear a medical alert device that you can use to call for help. Use stepladders instead of chairs to reach high objects. Don't climb if you're at risk for falls. Ask for help, if needed. Wear the correct eyeglasses, if you need them. Make your home safer. Remove rugs, cords, clutter, and furniture from walkways. Keep your house well lit. Use night-lights in hallways and bathrooms. Install and use sturdy handrails on stairways. Wear nonskid footwear, even inside. Don't walk barefoot or in socks without shoes. Be safe outside. Use handrails, curb cuts, and ramps whenever possible. Keep your hands free by using a shoulder bag or backpack. Try to walk in well-lit areas. Watch out for uneven ground, changes in pavement, and debris. Be careful in the winter. Walk on the grass or gravel when sidewalks are slippery. Use de-icer on steps and walkways. Add non-slip devices to shoes. Put grab bars and nonskid mats in your shower or tub and near the toilet. Try to use a shower chair or bath bench when bathing. Get into a tub or shower by putting in your weaker leg first. Get out with your strong side first. Have a phone or medical alert device in the bathroom with you. Where can you learn more? Go to http://www.[employer].com/ and enter G117 to learn more about \"Preventing Falls: Care Instructions. \"  Current as of: July 18, 2023

## 2024-02-03 ENCOUNTER — HOSPITAL ENCOUNTER (EMERGENCY)
Age: 84
Discharge: HOME | End: 2024-02-03
Payer: MEDICARE

## 2024-02-03 ENCOUNTER — APPOINTMENT (OUTPATIENT)
Dept: CT IMAGING | Age: 84
End: 2024-02-03
Payer: MEDICARE

## 2024-02-03 VITALS
OXYGEN SATURATION: 98 % | WEIGHT: 180 LBS | BODY MASS INDEX: 33.13 KG/M2 | DIASTOLIC BLOOD PRESSURE: 82 MMHG | HEIGHT: 62 IN | HEART RATE: 81 BPM | TEMPERATURE: 98 F | SYSTOLIC BLOOD PRESSURE: 163 MMHG | RESPIRATION RATE: 16 BRPM

## 2024-02-03 DIAGNOSIS — R31.9 HEMATURIA, UNSPECIFIED TYPE: Primary | ICD-10-CM

## 2024-02-03 LAB
ALBUMIN SERPL-MCNC: 4.3 G/DL (ref 3.4–5)
ALBUMIN/GLOB SERPL: 1.1 {RATIO} (ref 1.1–2.2)
ALP SERPL-CCNC: 79 U/L (ref 40–129)
ALT SERPL-CCNC: 13 U/L (ref 10–40)
ANION GAP SERPL CALCULATED.3IONS-SCNC: 11 MMOL/L (ref 3–16)
AST SERPL-CCNC: 19 U/L (ref 15–37)
BACTERIA URNS QL MICRO: ABNORMAL /HPF
BASOPHILS # BLD: 0 K/UL (ref 0–0.2)
BASOPHILS NFR BLD: 0.8 %
BILIRUB SERPL-MCNC: 0.6 MG/DL (ref 0–1)
BILIRUB UR QL STRIP.AUTO: NEGATIVE
BUN SERPL-MCNC: 18 MG/DL (ref 7–20)
CALCIUM SERPL-MCNC: 9.3 MG/DL (ref 8.3–10.6)
CHLORIDE SERPL-SCNC: 105 MMOL/L (ref 99–110)
CLARITY UR: CLEAR
CO2 SERPL-SCNC: 24 MMOL/L (ref 21–32)
COLOR UR: YELLOW
CREAT SERPL-MCNC: 1 MG/DL (ref 0.6–1.2)
DEPRECATED RDW RBC AUTO: 14.5 % (ref 12.4–15.4)
EOSINOPHIL # BLD: 0.2 K/UL (ref 0–0.6)
EOSINOPHIL NFR BLD: 4.1 %
EPI CELLS #/AREA URNS AUTO: 1 /HPF (ref 0–5)
GFR SERPLBLD CREATININE-BSD FMLA CKD-EPI: 56 ML/MIN/{1.73_M2}
GLUCOSE SERPL-MCNC: 126 MG/DL (ref 70–99)
GLUCOSE UR STRIP.AUTO-MCNC: NEGATIVE MG/DL
HCT VFR BLD AUTO: 40.8 % (ref 36–48)
HGB BLD-MCNC: 13.2 G/DL (ref 12–16)
HGB UR QL STRIP.AUTO: ABNORMAL
HYALINE CASTS #/AREA URNS AUTO: 0 /LPF (ref 0–8)
KETONES UR STRIP.AUTO-MCNC: NEGATIVE MG/DL
LEUKOCYTE ESTERASE UR QL STRIP.AUTO: NEGATIVE
LIPASE SERPL-CCNC: 27 U/L (ref 13–60)
LYMPHOCYTES # BLD: 2.4 K/UL (ref 1–5.1)
LYMPHOCYTES NFR BLD: 42.3 %
MCH RBC QN AUTO: 27.6 PG (ref 26–34)
MCHC RBC AUTO-ENTMCNC: 32.4 G/DL (ref 31–36)
MCV RBC AUTO: 85.1 FL (ref 80–100)
MONOCYTES # BLD: 0.4 K/UL (ref 0–1.3)
MONOCYTES NFR BLD: 7 %
NEUTROPHILS # BLD: 2.6 K/UL (ref 1.7–7.7)
NEUTROPHILS NFR BLD: 45.8 %
NITRITE UR QL STRIP.AUTO: NEGATIVE
PH UR STRIP.AUTO: 6 [PH] (ref 5–8)
PLATELET # BLD AUTO: 277 K/UL (ref 135–450)
PMV BLD AUTO: 7.8 FL (ref 5–10.5)
POTASSIUM SERPL-SCNC: 4.2 MMOL/L (ref 3.5–5.1)
PROT SERPL-MCNC: 8.1 G/DL (ref 6.4–8.2)
PROT UR STRIP.AUTO-MCNC: NEGATIVE MG/DL
RBC # BLD AUTO: 4.8 M/UL (ref 4–5.2)
RBC CLUMPS #/AREA URNS AUTO: 113 /HPF (ref 0–4)
SODIUM SERPL-SCNC: 140 MMOL/L (ref 136–145)
SP GR UR STRIP.AUTO: 1.01 (ref 1–1.03)
UA COMPLETE W REFLEX CULTURE PNL UR: ABNORMAL
UA DIPSTICK W REFLEX MICRO PNL UR: YES
URN SPEC COLLECT METH UR: ABNORMAL
UROBILINOGEN UR STRIP-ACNC: 0.2 E.U./DL
WBC # BLD AUTO: 5.7 K/UL (ref 4–11)
WBC #/AREA URNS AUTO: 1 /HPF (ref 0–5)

## 2024-02-03 PROCEDURE — 99284 EMERGENCY DEPT VISIT MOD MDM: CPT

## 2024-02-03 PROCEDURE — 81001 URINALYSIS AUTO W/SCOPE: CPT

## 2024-02-03 PROCEDURE — 74176 CT ABD & PELVIS W/O CONTRAST: CPT

## 2024-02-03 PROCEDURE — 83690 ASSAY OF LIPASE: CPT

## 2024-02-03 PROCEDURE — 36415 COLL VENOUS BLD VENIPUNCTURE: CPT

## 2024-02-03 PROCEDURE — 80053 COMPREHEN METABOLIC PANEL: CPT

## 2024-02-03 PROCEDURE — 85025 COMPLETE CBC W/AUTO DIFF WBC: CPT

## 2024-02-03 ASSESSMENT — PAIN SCALES - GENERAL: PAINLEVEL_OUTOF10: 4

## 2024-02-03 ASSESSMENT — PAIN DESCRIPTION - ORIENTATION: ORIENTATION: LOWER;MID

## 2024-02-03 ASSESSMENT — ENCOUNTER SYMPTOMS
RHINORRHEA: 0
COUGH: 0
DIARRHEA: 0
NAUSEA: 0
ABDOMINAL PAIN: 1
VOMITING: 0
WHEEZING: 0
SHORTNESS OF BREATH: 0

## 2024-02-03 ASSESSMENT — PAIN - FUNCTIONAL ASSESSMENT: PAIN_FUNCTIONAL_ASSESSMENT: 0-10

## 2024-02-03 ASSESSMENT — PAIN DESCRIPTION - LOCATION: LOCATION: ABDOMEN

## 2024-02-03 NOTE — ED PROVIDER NOTES
[facility] EMERGENCY DEPARTMENT  EMERGENCY DEPARTMENT ENCOUNTER        Pt Name: Della Hernandez  MRN: [id number]  Birthdate 1940  Date of evaluation: 2/3/2024  Provider: Jessica Bar PA-C  PCP: Jennifer Arshad DO  Note Started: 11:18 AM EST 2/3/24      ZARI. I have evaluated this patient.        CHIEF COMPLAINT       Chief Complaint   Patient presents with    Hematuria     Pt to ED with CC of hematuria starting last night.  Pt also verbalizes mid lower abd pain.       HISTORY OF PRESENT ILLNESS: 1 or more Elements     History From: patient   Limitations to history : None    Della Hernandez is a 83 y.o. female who presents for evaluation of hematuria that started last night.  She denies any dysuria, urgency or frequency.  No history of similar symptoms.  States that she has had kidney stones in the past but that was a lot more painful.  She does have some mild discomfort to the left lower abdomen.  She denies any radiation or flank pain.  No fevers or chills.  She is not anticoagulated.  No nausea vomiting or diarrhea.  She has no other complaints or concerns at this time.    Nursing Notes were all reviewed and agreed with or any disagreements were addressed in the HPI.    REVIEW OF SYSTEMS :      Review of Systems   Constitutional:  Negative for appetite change, chills and fever.   HENT:  Negative for congestion and rhinorrhea.    Respiratory:  Negative for cough, shortness of breath and wheezing.    Cardiovascular:  Negative for chest pain.   Gastrointestinal:  Positive for abdominal pain. Negative for diarrhea, nausea and vomiting.   Genitourinary:  Positive for hematuria. Negative for difficulty urinating and dysuria.   Musculoskeletal:  Negative for neck pain and neck stiffness.   Skin:  Negative for rash.   Neurological:  Negative for headaches.       Positives and Pertinent negatives as per HPI.     SURGICAL HISTORY     Past Surgical History:   Procedure Laterality Date     carpal tunnel syndrome (12/21/2015), Chronic back pain, COPD (chronic obstructive pulmonary disease) (HCC), Dizziness, Fibromyalgia, GERD (gastroesophageal reflux disease), Hyperlipidemia, Hypertension, Internal hemorrhoid, bleeding (12/12/2012), Irritable bowel syndrome, LLQ pain (6/26/2020), Obesity, Osteoarthritis, Osteopenia, Rash, Renal calculus (5/6/2013), Restless legs syndrome, Sciatica (7/16/2014), Sinusitis, Tinnitus, TMJ dysfunction, and Urinary incontinence.     EMERGENCY DEPARTMENT COURSE and DIFFERENTIAL DIAGNOSIS/MDM:   Vitals:    Vitals:    02/03/24 1104   BP: (!) 163/82   Pulse: 81   Resp: 16   Temp: 98 °F (36.7 °C)   TempSrc: Oral   SpO2: 98%   Weight: 81.6 kg (180 lb)   Height: 1.575 m (5' 2\")       Patient was given the following medications:  Medications - No data to display          Is this patient to be included in the SEP-1 Core Measure due to severe sepsis or septic shock?   No   Exclusion criteria - the patient is NOT to be included for SEP-1 Core Measure due to:  2+ SIRS criteria are not met    Chronic Conditions affecting care:    has a past medical history of Allergic rhinitis, Asthma, Bilateral carpal tunnel syndrome (12/21/2015), Chronic back pain, COPD (chronic obstructive pulmonary disease) (HCC), Dizziness, Fibromyalgia, GERD (gastroesophageal reflux disease), Hyperlipidemia, Hypertension, Internal hemorrhoid, bleeding (12/12/2012), Irritable bowel syndrome, LLQ pain (6/26/2020), Obesity, Osteoarthritis, Osteopenia, Rash, Renal calculus (5/6/2013), Restless legs syndrome, Sciatica (7/16/2014), Sinusitis, Tinnitus, TMJ dysfunction, and Urinary incontinence.    CONSULTS: (Who and What was discussed)  None      Social Determinants Significantly Affecting Health : None    Records Reviewed (External and Source) n/a    CC/HPI Summary, DDx, ED Course, and Reassessment: Patient presents for evaluation of hematuria.  On exam, she is resting comfortably in bed no acute distress and nontoxic.

## 2024-02-05 ENCOUNTER — OFFICE VISIT (OUTPATIENT)
Dept: INTERNAL MEDICINE CLINIC | Age: 84
End: 2024-02-05
Payer: MEDICARE

## 2024-02-05 VITALS
SYSTOLIC BLOOD PRESSURE: 126 MMHG | HEART RATE: 86 BPM | DIASTOLIC BLOOD PRESSURE: 68 MMHG | HEIGHT: 62 IN | WEIGHT: 177 LBS | BODY MASS INDEX: 32.57 KG/M2 | OXYGEN SATURATION: 97 %

## 2024-02-05 DIAGNOSIS — M54.9 COSTOVERTEBRAL ANGLE TENDERNESS: ICD-10-CM

## 2024-02-05 DIAGNOSIS — R15.9 FULL INCONTINENCE OF FECES: ICD-10-CM

## 2024-02-05 DIAGNOSIS — R31.29 OTHER MICROSCOPIC HEMATURIA: Primary | ICD-10-CM

## 2024-02-05 LAB
BILIRUBIN, POC: NORMAL
BLOOD URINE, POC: NORMAL
CLARITY, POC: CLEAR
COLOR, POC: YELLOW
GLUCOSE URINE, POC: NORMAL
KETONES, POC: NORMAL
LEUKOCYTE EST, POC: NORMAL
NITRITE, POC: NORMAL
PH, POC: 5.5
PROTEIN, POC: NORMAL
SPECIFIC GRAVITY, POC: 1.02
UROBILINOGEN, POC: 0.2

## 2024-02-05 PROCEDURE — 3078F DIAST BP <80 MM HG: CPT | Performed by: STUDENT IN AN ORGANIZED HEALTH CARE EDUCATION/TRAINING PROGRAM

## 2024-02-05 PROCEDURE — 81002 URINALYSIS NONAUTO W/O SCOPE: CPT | Performed by: STUDENT IN AN ORGANIZED HEALTH CARE EDUCATION/TRAINING PROGRAM

## 2024-02-05 PROCEDURE — 1123F ACP DISCUSS/DSCN MKR DOCD: CPT | Performed by: STUDENT IN AN ORGANIZED HEALTH CARE EDUCATION/TRAINING PROGRAM

## 2024-02-05 PROCEDURE — 99214 OFFICE O/P EST MOD 30 MIN: CPT | Performed by: STUDENT IN AN ORGANIZED HEALTH CARE EDUCATION/TRAINING PROGRAM

## 2024-02-05 PROCEDURE — 3074F SYST BP LT 130 MM HG: CPT | Performed by: STUDENT IN AN ORGANIZED HEALTH CARE EDUCATION/TRAINING PROGRAM

## 2024-02-05 ASSESSMENT — PATIENT HEALTH QUESTIONNAIRE - PHQ9
7. TROUBLE CONCENTRATING ON THINGS, SUCH AS READING THE NEWSPAPER OR WATCHING TELEVISION: 0
SUM OF ALL RESPONSES TO PHQ9 QUESTIONS 1 & 2: 0
3. TROUBLE FALLING OR STAYING ASLEEP: 0
SUM OF ALL RESPONSES TO PHQ QUESTIONS 1-9: 0
10. IF YOU CHECKED OFF ANY PROBLEMS, HOW DIFFICULT HAVE THESE PROBLEMS MADE IT FOR YOU TO DO YOUR WORK, TAKE CARE OF THINGS AT HOME, OR GET ALONG WITH OTHER PEOPLE: 0
5. POOR APPETITE OR OVEREATING: 0
2. FEELING DOWN, DEPRESSED OR HOPELESS: 0
8. MOVING OR SPEAKING SO SLOWLY THAT OTHER PEOPLE COULD HAVE NOTICED. OR THE OPPOSITE, BEING SO FIGETY OR RESTLESS THAT YOU HAVE BEEN MOVING AROUND A LOT MORE THAN USUAL: 0
4. FEELING TIRED OR HAVING LITTLE ENERGY: 0
SUM OF ALL RESPONSES TO PHQ QUESTIONS 1-9: 0
SUM OF ALL RESPONSES TO PHQ QUESTIONS 1-9: 0
1. LITTLE INTEREST OR PLEASURE IN DOING THINGS: 0
6. FEELING BAD ABOUT YOURSELF - OR THAT YOU ARE A FAILURE OR HAVE LET YOURSELF OR YOUR FAMILY DOWN: 0
9. THOUGHTS THAT YOU WOULD BE BETTER OFF DEAD, OR OF HURTING YOURSELF: 0
SUM OF ALL RESPONSES TO PHQ QUESTIONS 1-9: 0

## 2024-02-05 ASSESSMENT — ANXIETY QUESTIONNAIRES
4. TROUBLE RELAXING: 0
IF YOU CHECKED OFF ANY PROBLEMS ON THIS QUESTIONNAIRE, HOW DIFFICULT HAVE THESE PROBLEMS MADE IT FOR YOU TO DO YOUR WORK, TAKE CARE OF THINGS AT HOME, OR GET ALONG WITH OTHER PEOPLE: NOT DIFFICULT AT ALL
GAD7 TOTAL SCORE: 0
3. WORRYING TOO MUCH ABOUT DIFFERENT THINGS: 0
7. FEELING AFRAID AS IF SOMETHING AWFUL MIGHT HAPPEN: 0
5. BEING SO RESTLESS THAT IT IS HARD TO SIT STILL: 0
1. FEELING NERVOUS, ANXIOUS, OR ON EDGE: 0
2. NOT BEING ABLE TO STOP OR CONTROL WORRYING: 0
6. BECOMING EASILY ANNOYED OR IRRITABLE: 0

## 2024-02-05 NOTE — PROGRESS NOTES
Bilateral carpal tunnel syndrome 12/21/2015    Attached Notes    Procedures by Vik Chavez MD at 12/17/2015 4:21 PM    Author: Vik Chavez MD Service: Physical Medicine and Rehabilitation Author Type: Physician   Filed: 12/21/2015 12:22 AM Note Time: 12/17/2015 4:21 PM Note Type: Procedures   Status: Signed : Vik Chavez MD (Physician)    Pre-procedure Diagnoses   1. Paresthesias [R20.2]        Post-procedure Diagnoses     Chronic back pain     COPD (chronic obstructive pulmonary disease) (HCC)     Dizziness     Fibromyalgia     GERD (gastroesophageal reflux disease)     Hyperlipidemia     Hypertension     Internal hemorrhoid, bleeding 12/12/2012    Hemorrhoid surgery  In 2018    Irritable bowel syndrome     LLQ pain 6/26/2020    Obesity     Osteoarthritis     Osteopenia     Rash     Renal calculus 5/6/2013    Restless legs syndrome     Sciatica 7/16/2014    Sinusitis     Tinnitus     TMJ dysfunction     Urinary incontinence      Prior to Admission medications    Medication Sig Start Date End Date Taking? Authorizing Provider   Diaper Rash Products (DESITIN MULTI-PURPOSE HEALING) OINT Apply to affected areas daily as needed 11/13/23  Yes Jennifer Arshad,    hydrocortisone (ANUSOL-HC) 2.5 % CREA rectal cream USE A FINGERTIP AMOUNT RECTALLY TWICE DAILY 9/11/23  Yes Jn Stovall MD   olmesartan (BENICAR) 40 MG tablet Take 1 tablet by mouth daily 9/11/23  Yes Jn Stovall MD   famotidine (PEPCID) 20 MG tablet Take 1 tablet by mouth 2 times daily as needed (reflux) 9/11/23  Yes Jn Stoavll MD   cetirizine (ZYRTEC) 5 MG tablet Take 1 tablet by mouth as needed for Allergies 6/16/23  Yes Susan Coughlin   fluticasone-umeclidin-vilant (TRELEGY ELLIPTA) 100-62.5-25 MCG/INH AEPB Inhale 1 puff into the lungs daily  Patient taking differently: Inhale 1 puff into the lungs as needed 8/24/22  Yes Lilli Conley MD   albuterol sulfate HFA (PROVENTIL;VENTOLIN;PROAIR) 108 (90 Base) MCG/ACT

## 2024-02-06 LAB — BACTERIA UR CULT: NORMAL

## 2024-02-08 ENCOUNTER — TELEPHONE (OUTPATIENT)
Dept: INTERNAL MEDICINE CLINIC | Age: 84
End: 2024-02-08

## 2024-02-08 NOTE — TELEPHONE ENCOUNTER
Patient is to schedule with Urology. The phone number is [phone]. I left a voicemail again asking patient to call the office for referral information based off her test results.

## 2024-03-14 DIAGNOSIS — I10 ESSENTIAL HYPERTENSION: ICD-10-CM

## 2024-03-14 RX ORDER — OLMESARTAN MEDOXOMIL 40 MG/1
40 TABLET ORAL DAILY
Qty: 90 TABLET | Refills: 3 | OUTPATIENT
Start: 2024-03-14

## 2024-03-14 NOTE — TELEPHONE ENCOUNTER
Last OV: 9/11/2023  Next OV: Visit date not found    Next appointment due:    Last fill:9/11/23  Refills:1#90

## 2024-03-21 DIAGNOSIS — I10 ESSENTIAL HYPERTENSION: ICD-10-CM

## 2024-03-21 RX ORDER — OLMESARTAN MEDOXOMIL 40 MG/1
40 TABLET ORAL DAILY
Qty: 90 TABLET | Refills: 1 | Status: CANCELLED | OUTPATIENT
Start: 2024-03-21

## 2024-04-04 DIAGNOSIS — I10 ESSENTIAL HYPERTENSION: ICD-10-CM

## 2024-04-04 RX ORDER — OLMESARTAN MEDOXOMIL 40 MG/1
40 TABLET ORAL DAILY
Qty: 90 TABLET | Refills: 1 | Status: SHIPPED | OUTPATIENT
Start: 2024-04-04 | End: 2024-10-01

## 2024-04-04 NOTE — TELEPHONE ENCOUNTER
Recent Visits  Date Type Provider Dept   02/05/24 Office Visit Jennifer Arshad DO [facility]   11/13/23 Office Visit Jennifer Arshad DO [facility]   09/26/23 Office Visit Jennifer Arshad DO [facility]   09/18/23 Office Visit Jennifer Arshad DO [facility]   09/11/23 Office Visit Jn Stovall MD [facility]   11/11/22 Office Visit Susan Coughlin [facility]   Showing recent visits within past 540 days with a meds authorizing provider and meeting all other requirements  Future Appointments  Date Type Provider Dept   04/09/24 Appointment Jennifer Arshad DO [facility]   07/01/24 Appointment Jennifer Arshad DO [facility]   Showing future appointments within next 150 days with a meds authorizing provider and meeting all other requirements     2/5/2024     Requested Prescriptions     Pending Prescriptions Disp Refills    olmesartan (BENICAR) 40 MG tablet 90 tablet 1     Sig: Take 1 tablet by mouth daily

## 2024-04-09 ENCOUNTER — OFFICE VISIT (OUTPATIENT)
Dept: INTERNAL MEDICINE CLINIC | Age: 84
End: 2024-04-09
Payer: MEDICARE

## 2024-04-09 VITALS
OXYGEN SATURATION: 97 % | HEART RATE: 86 BPM | SYSTOLIC BLOOD PRESSURE: 128 MMHG | WEIGHT: 184.4 LBS | DIASTOLIC BLOOD PRESSURE: 70 MMHG | BODY MASS INDEX: 33.73 KG/M2

## 2024-04-09 DIAGNOSIS — S76.812A STRAIN OF LEFT ILIOPSOAS MUSCLE, INITIAL ENCOUNTER: ICD-10-CM

## 2024-04-09 DIAGNOSIS — L85.3 DRY SKIN DERMATITIS: ICD-10-CM

## 2024-04-09 DIAGNOSIS — Z91.09 ENVIRONMENTAL ALLERGIES: ICD-10-CM

## 2024-04-09 DIAGNOSIS — M17.0 PRIMARY OSTEOARTHRITIS OF BOTH KNEES: ICD-10-CM

## 2024-04-09 DIAGNOSIS — I10 ESSENTIAL HYPERTENSION: Primary | ICD-10-CM

## 2024-04-09 PROCEDURE — 3074F SYST BP LT 130 MM HG: CPT | Performed by: STUDENT IN AN ORGANIZED HEALTH CARE EDUCATION/TRAINING PROGRAM

## 2024-04-09 PROCEDURE — 3078F DIAST BP <80 MM HG: CPT | Performed by: STUDENT IN AN ORGANIZED HEALTH CARE EDUCATION/TRAINING PROGRAM

## 2024-04-09 PROCEDURE — 1123F ACP DISCUSS/DSCN MKR DOCD: CPT | Performed by: STUDENT IN AN ORGANIZED HEALTH CARE EDUCATION/TRAINING PROGRAM

## 2024-04-09 PROCEDURE — 99214 OFFICE O/P EST MOD 30 MIN: CPT | Performed by: STUDENT IN AN ORGANIZED HEALTH CARE EDUCATION/TRAINING PROGRAM

## 2024-04-09 RX ORDER — ERGOCALCIFEROL 1.25 MG/1
50000 CAPSULE ORAL WEEKLY
COMMUNITY
Start: 2024-03-05

## 2024-04-09 RX ORDER — SPIRONOLACTONE 25 MG/1
TABLET ORAL
Qty: 90 TABLET | Refills: 1 | Status: SHIPPED | OUTPATIENT
Start: 2024-04-09

## 2024-04-09 RX ORDER — HYDROCORTISONE 25 MG/G
CREAM TOPICAL
Qty: 28 G | Refills: 3 | Status: SHIPPED | OUTPATIENT
Start: 2024-04-09

## 2024-04-09 RX ORDER — FLUTICASONE PROPIONATE 50 MCG
2 SPRAY, SUSPENSION (ML) NASAL DAILY
Qty: 16 G | Refills: 0 | Status: SHIPPED | OUTPATIENT
Start: 2024-04-09

## 2024-04-09 NOTE — PATIENT INSTRUCTIONS
Thank you for allowing me to care for you!    Patient instructions:  Fasting labs  Flonase  Shingles vaccine + Tdap vaccine

## 2024-04-09 NOTE — PROGRESS NOTES
Della Hernandez (:  1940) is a 83 y.o. female,Established patient, here for evaluation of the following chief complaint(s):  Follow-up           Assessment/Plan:     1. Essential hypertension  - BP controlled continue olmesartan 40 mg, spirolactone 25 mg  - BP Goal <140/90, Patient to bring BP log to next visit, Mediterranean lifestyle discussed and encouraged; handout given, and DASH diet encouraged  - MICROALBUMIN / CREATININE URINE RATIO; Future    2. Strain of left iliopsoas muscle, initial encounter  - Stretches given    3. Environmental allergies  - fluticasone (FLONASE) 50 MCG/ACT nasal spray; 2 sprays by Each Nostril route daily  Dispense: 16 g; Refill: 0  - Continue zyrtec     4. Primary osteoarthritis of both knees  - diclofenac sodium (VOLTAREN) 1 % GEL; Apply 2 g topically 4 times daily  Dispense: 50 g; Refill: 0        Return in about 6 months (around 10/9/2024) for AWV + HTN.             Subjective   SUBJECTIVE/OBJECTIVE:  HPI:   HTN   Managed with olmesartan 40 mg, spirolactone 25 mg  Denies chest pain, SOB, le swelling  No adverse effects   Physical activity: Limited due knee pain. Waiting knee injection. Appt next week   Goes to silver sneakers       Chronic LLQ pain  Worse with active hip flexion  Hx of hysterectomy  CT abd 2024 unremarkable  Recent cystoscopy unremarkable     Patient Active Problem List   Diagnosis    Prediabetes    Vitamin D deficiency    Liver mass    Asthma exacerbation    Mixed hyperlipidemia    Essential hypertension    Pulmonary hypertension (HCC)    Moderate mitral insufficiency    Gastroesophageal reflux disease without esophagitis    Multiple food allergies    Personal history of colonic polyps    Seasonal allergies    Intertrigo    Hidradenitis suppurativa    Dry skin dermatitis    Multiple thyroid nodules    Chronic renal disease, stage III (HCC) [900754]     Past Medical History:   Diagnosis Date    Allergic rhinitis     Asthma     Bilateral carpal

## 2024-04-15 DIAGNOSIS — R73.03 PREDIABETES: ICD-10-CM

## 2024-04-15 DIAGNOSIS — I10 ESSENTIAL HYPERTENSION: ICD-10-CM

## 2024-04-15 DIAGNOSIS — E78.2 MIXED HYPERLIPIDEMIA: ICD-10-CM

## 2024-04-15 DIAGNOSIS — E55.9 VITAMIN D DEFICIENCY: ICD-10-CM

## 2024-04-16 LAB
25(OH)D3 SERPL-MCNC: 28.2 NG/ML
ALBUMIN SERPL-MCNC: 4.1 G/DL (ref 3.4–5)
ALBUMIN/GLOB SERPL: 1.4 {RATIO} (ref 1.1–2.2)
ALP SERPL-CCNC: 84 U/L (ref 40–129)
ALT SERPL-CCNC: 12 U/L (ref 10–40)
ANION GAP SERPL CALCULATED.3IONS-SCNC: 11 MMOL/L (ref 3–16)
AST SERPL-CCNC: 17 U/L (ref 15–37)
BASOPHILS # BLD: 0.1 K/UL (ref 0–0.2)
BASOPHILS NFR BLD: 1.3 %
BILIRUB SERPL-MCNC: 0.4 MG/DL (ref 0–1)
BUN SERPL-MCNC: 20 MG/DL (ref 7–20)
CALCIUM SERPL-MCNC: 9.2 MG/DL (ref 8.3–10.6)
CHLORIDE SERPL-SCNC: 106 MMOL/L (ref 99–110)
CHOLEST SERPL-MCNC: 274 MG/DL (ref 0–199)
CO2 SERPL-SCNC: 25 MMOL/L (ref 21–32)
CREAT SERPL-MCNC: 1 MG/DL (ref 0.6–1.2)
CREAT UR-MCNC: 107.3 MG/DL (ref 28–259)
DEPRECATED RDW RBC AUTO: 14.5 % (ref 12.4–15.4)
EOSINOPHIL # BLD: 0.3 K/UL (ref 0–0.6)
EOSINOPHIL NFR BLD: 5 %
EST. AVERAGE GLUCOSE BLD GHB EST-MCNC: 114 MG/DL
GFR SERPLBLD CREATININE-BSD FMLA CKD-EPI: 56 ML/MIN/{1.73_M2}
GLUCOSE SERPL-MCNC: 87 MG/DL (ref 70–99)
HBA1C MFR BLD: 5.6 %
HCT VFR BLD AUTO: 39.5 % (ref 36–48)
HDLC SERPL-MCNC: 83 MG/DL (ref 40–60)
HGB BLD-MCNC: 12.9 G/DL (ref 12–16)
LDLC SERPL CALC-MCNC: 182 MG/DL
LYMPHOCYTES # BLD: 2.3 K/UL (ref 1–5.1)
LYMPHOCYTES NFR BLD: 43.3 %
MCH RBC QN AUTO: 27.5 PG (ref 26–34)
MCHC RBC AUTO-ENTMCNC: 32.6 G/DL (ref 31–36)
MCV RBC AUTO: 84.4 FL (ref 80–100)
MICROALBUMIN UR DL<=1MG/L-MCNC: <1.2 MG/DL
MICROALBUMIN/CREAT UR: NORMAL MG/G (ref 0–30)
MONOCYTES # BLD: 0.4 K/UL (ref 0–1.3)
MONOCYTES NFR BLD: 6.9 %
NEUTROPHILS # BLD: 2.3 K/UL (ref 1.7–7.7)
NEUTROPHILS NFR BLD: 43.5 %
PLATELET # BLD AUTO: 283 K/UL (ref 135–450)
PMV BLD AUTO: 8.8 FL (ref 5–10.5)
POTASSIUM SERPL-SCNC: 4.7 MMOL/L (ref 3.5–5.1)
PROT SERPL-MCNC: 7 G/DL (ref 6.4–8.2)
RBC # BLD AUTO: 4.68 M/UL (ref 4–5.2)
SODIUM SERPL-SCNC: 142 MMOL/L (ref 136–145)
TRIGL SERPL-MCNC: 43 MG/DL (ref 0–150)
VLDLC SERPL CALC-MCNC: 9 MG/DL
WBC # BLD AUTO: 5.3 K/UL (ref 4–11)

## 2024-05-03 ENCOUNTER — OFFICE VISIT (OUTPATIENT)
Dept: INTERNAL MEDICINE CLINIC | Age: 84
End: 2024-05-03
Payer: MEDICARE

## 2024-05-03 VITALS
SYSTOLIC BLOOD PRESSURE: 122 MMHG | DIASTOLIC BLOOD PRESSURE: 82 MMHG | OXYGEN SATURATION: 99 % | BODY MASS INDEX: 33.73 KG/M2 | WEIGHT: 184.4 LBS | HEART RATE: 77 BPM

## 2024-05-03 DIAGNOSIS — R51.9 LEFT-SIDED HEADACHE: ICD-10-CM

## 2024-05-03 DIAGNOSIS — Z23 NEED FOR VACCINATION: ICD-10-CM

## 2024-05-03 DIAGNOSIS — H10.13 ALLERGIC CONJUNCTIVITIS OF BOTH EYES: Primary | ICD-10-CM

## 2024-05-03 LAB
CRP SERPL-MCNC: <3 MG/L (ref 0–5.1)
ERYTHROCYTE [SEDIMENTATION RATE] IN BLOOD BY WESTERGREN METHOD: 40 MM/HR (ref 0–30)

## 2024-05-03 PROCEDURE — 1123F ACP DISCUSS/DSCN MKR DOCD: CPT | Performed by: STUDENT IN AN ORGANIZED HEALTH CARE EDUCATION/TRAINING PROGRAM

## 2024-05-03 PROCEDURE — 3079F DIAST BP 80-89 MM HG: CPT | Performed by: STUDENT IN AN ORGANIZED HEALTH CARE EDUCATION/TRAINING PROGRAM

## 2024-05-03 PROCEDURE — 3074F SYST BP LT 130 MM HG: CPT | Performed by: STUDENT IN AN ORGANIZED HEALTH CARE EDUCATION/TRAINING PROGRAM

## 2024-05-03 PROCEDURE — 99214 OFFICE O/P EST MOD 30 MIN: CPT | Performed by: STUDENT IN AN ORGANIZED HEALTH CARE EDUCATION/TRAINING PROGRAM

## 2024-05-03 RX ORDER — OLOPATADINE HYDROCHLORIDE 1 MG/ML
SOLUTION/ DROPS OPHTHALMIC 2 TIMES DAILY
COMMUNITY
End: 2024-05-03

## 2024-05-03 RX ORDER — PREDNISOLONE ACETATE 10 MG/ML
SUSPENSION/ DROPS OPHTHALMIC
COMMUNITY
Start: 2024-04-23

## 2024-05-03 RX ORDER — ZOSTER VACCINE RECOMBINANT, ADJUVANTED 50 MCG/0.5
0.5 KIT INTRAMUSCULAR SEE ADMIN INSTRUCTIONS
Qty: 0.5 ML | Refills: 0 | Status: SHIPPED | OUTPATIENT
Start: 2024-05-03 | End: 2024-10-30

## 2024-05-03 ASSESSMENT — VISUAL ACUITY: OU: 1

## 2024-05-03 NOTE — PROGRESS NOTES
Della Hernandez (:  1940) is a 83 y.o. female,Established patient, here for evaluation of the following chief complaint(s):  Eye Problem        Assessment/Plan:     1. Allergic conjunctivitis of both eyes  -Discontinue olopatadine  -Once taper of prednisolone acetate eyedrops is complete, start naphazoline-pheniramine 4 times a day  - naphazoline-pheniramine (NAPHCON-A) 0.025-0.3 % ophthalmic solution; Place 1 drop into both eyes 4 times daily  Dispense: 15 mL; Refill: 2    2. Left-sided headache  -Concern for possible giant cell arteritis  - Sedimentation Rate; Future  -CRP    3. Need for vaccination  - zoster recombinant adjuvanted vaccine (SHINGRIX) 50 MCG/0.5ML SUSR injection; Inject 0.5 mLs into the muscle See Admin Instructions 1 dose now and repeat in 2-6 months  Dispense: 0.5 mL; Refill: 0  - Tetanus-Diphth-Acell Pertussis (BOOSTRIX) 5-2.5-18.5 LF-MCG/0.5 injection; Inject 0.5 mLs into the muscle once for 1 dose  Dispense: 0.5 mL; Refill: 0  - respiratory syncytial vaccine, adjuvanted (AREXVY) 120 MCG/0.5ML injection; Inject 0.5 mLs into the muscle once for 1 dose  Dispense: 0.5 mL; Refill: 0        Return if symptoms worsen or fail to improve.           Subjective   SUBJECTIVE/OBJECTIVE:  HPI:     Has hx of cataracts, surgery not indicated yet per opthalmology  in 2023  Saw Optometrist 10 days ago; now recommending cataract surgery  Was prescribed prednisolone eye drops for 10 day taper    Eye doctor has been having her taper prednisone eye drops  Since tapering, saw pinwheel in left eye 2 nights ago  Lasted 45 min, has not experienced since then   Only occurred with light on  Diminished after 45 min and resolved overnight   Spoke to eye doctor who diagnosed her with ocular migraine attack   No headache or pain in the eye at the time   Was advised to continue prednisone and to see pcp    Left sided head pain  Dull and throbby  Chronic and started in November prior to optho

## 2024-05-14 ENCOUNTER — TELEPHONE (OUTPATIENT)
Dept: INTERNAL MEDICINE CLINIC | Age: 84
End: 2024-05-14

## 2024-05-14 DIAGNOSIS — H10.13 ALLERGIC CONJUNCTIVITIS OF BOTH EYES: ICD-10-CM

## 2024-05-14 NOTE — TELEPHONE ENCOUNTER
Dr. Arshad sent a prescription (naphazoline-pheniramine (NAPHCON-A) 0.025-0.3 % ophthalmic solution) to the [facility].     Please resend the prescription to the following Pharmacy:    [facility]  Address: [address] [place] [address]   Phone: [phone]    Refill Authorization Note    is requesting a refill authorization.    Brief assessment and rationale for refill: APPROVE: prr          Medication Therapy Plan: approve 6 more    Medication reconciliation completed: No                         Comments:      Requested Prescriptions   Signed Prescriptions Disp Refills    potassium chloride SA (K-DUR,KLOR-CON) 20 MEQ tablet 90 tablet 1     Sig: Take 1 tablet (20 mEq total) by mouth once. for 1 dose       Endocrinology:  Minerals - Potassium Supplementation Passed - 6/18/2020  3:18 PM        Passed - Patient is at least 18 years old        Passed - Office visit in past 12 months or future 90 days.     Recent Outpatient Visits            2 days ago Personal history of other malignant neoplasm of skin    [place] - Dermatology Kailyn Fletcher MD    6 days ago Dysphonia    [place] - ENT Fly Johnson MD    1 month ago Bronchiectasis without complication    [facility] - Pulmonary Jonathan Nicole MD    2 months ago CVID (common variable immunodeficiency)    [place] - Allergy Jessica Mcmullen MD    3 months ago Routine physical examination    [facility] Medicine Armond Cortez MD          Future Appointments              In 2 months URINE [facility] [place]    In 2 months LAB, [place] [facility] [place]    In 2 months Armond Cortez MD [facility]    In 2 months MD [facility] - Urology 4th Floor, Kalyan Prado    In 4 months [facility]    In 4 months [facility] [place] [facility]    In 5 months LAB, [place] [facility] [place]    In 5 months [facility] US1 [facility] [place]    In 6 months [facility]once [facility] [place] [facility]    In 7 months MD [facility] - Pulmonary, [facility]                Passed - K in normal range  and within 180 days     Potassium   Date Value Ref Range Status   04/01/2020 4.3 3.5 - 5.1 mmol/L Final   02/21/2020 4.2 3.5 - 5.1 mmol/L Final   01/13/2020 4.1 3.5 - 5.1 mmol/L Final              Passed - Cr is 1.3 or below and within 180 days     Creatinine   Date Value Ref Range Status   04/01/2020 0.8 0.5 - 1.4 mg/dL Final   02/21/2020 0.9 0.5 - 1.4 mg/dL Final   01/13/2020 0.70 0.50 - 1.40 mg/dL Final              Passed - eGFR within 180 days     eGFR if non    Date Value Ref Range Status   04/01/2020 >60.0 >60 mL/min/1.73 m^2 Final     Comment:     Calculation used to obtain the estimated glomerular filtration  rate (eGFR) is the CKD-EPI equation.      02/21/2020 >60.0 >60 mL/min/1.73 m^2 Final     Comment:     Calculation used to obtain the estimated glomerular filtration  rate (eGFR) is the CKD-EPI equation.      01/13/2020 >60 >60 mL/min/1.73 m^2 Final     Comment:     Calculation used to obtain the estimated glomerular filtration  rate (eGFR) is the CKD-EPI equation.        eGFR if    Date Value Ref Range Status   04/01/2020 >60.0 >60 mL/min/1.73 m^2 Final   02/21/2020 >60.0 >60 mL/min/1.73 m^2 Final   01/13/2020 >60 >60 mL/min/1.73 m^2 Final                  Appointments  past 12m or future 3m with PCP    Date Provider   Last Visit   3/11/2020 Armond Cortez MD   Next Visit   9/11/2020 Armond Cortez MD   ED visits in past 90 days: 0     Note composed:4:30 PM 06/18/2020

## 2024-06-12 ENCOUNTER — OFFICE VISIT (OUTPATIENT)
Dept: INTERNAL MEDICINE CLINIC | Age: 84
End: 2024-06-12
Payer: MEDICARE

## 2024-06-12 VITALS
OXYGEN SATURATION: 98 % | SYSTOLIC BLOOD PRESSURE: 126 MMHG | WEIGHT: 186 LBS | DIASTOLIC BLOOD PRESSURE: 72 MMHG | HEART RATE: 82 BPM | BODY MASS INDEX: 34.02 KG/M2

## 2024-06-12 DIAGNOSIS — L73.2 HIDRADENITIS SUPPURATIVA: ICD-10-CM

## 2024-06-12 DIAGNOSIS — L73.2 HIDRADENITIS SUPPURATIVA: Primary | ICD-10-CM

## 2024-06-12 PROCEDURE — 99213 OFFICE O/P EST LOW 20 MIN: CPT | Performed by: NURSE PRACTITIONER

## 2024-06-12 PROCEDURE — 3078F DIAST BP <80 MM HG: CPT | Performed by: NURSE PRACTITIONER

## 2024-06-12 PROCEDURE — 1123F ACP DISCUSS/DSCN MKR DOCD: CPT | Performed by: NURSE PRACTITIONER

## 2024-06-12 PROCEDURE — 3074F SYST BP LT 130 MM HG: CPT | Performed by: NURSE PRACTITIONER

## 2024-06-12 RX ORDER — DOXYCYCLINE HYCLATE 100 MG/1
100 CAPSULE ORAL 2 TIMES DAILY
Qty: 20 CAPSULE | Refills: 0 | Status: SHIPPED | OUTPATIENT
Start: 2024-06-12 | End: 2024-06-22

## 2024-06-12 ASSESSMENT — ANXIETY QUESTIONNAIRES
1. FEELING NERVOUS, ANXIOUS, OR ON EDGE: NOT AT ALL
4. TROUBLE RELAXING: NOT AT ALL
IF YOU CHECKED OFF ANY PROBLEMS ON THIS QUESTIONNAIRE, HOW DIFFICULT HAVE THESE PROBLEMS MADE IT FOR YOU TO DO YOUR WORK, TAKE CARE OF THINGS AT HOME, OR GET ALONG WITH OTHER PEOPLE: NOT DIFFICULT AT ALL
GAD7 TOTAL SCORE: 0
2. NOT BEING ABLE TO STOP OR CONTROL WORRYING: NOT AT ALL
3. WORRYING TOO MUCH ABOUT DIFFERENT THINGS: NOT AT ALL
7. FEELING AFRAID AS IF SOMETHING AWFUL MIGHT HAPPEN: NOT AT ALL
5. BEING SO RESTLESS THAT IT IS HARD TO SIT STILL: NOT AT ALL
6. BECOMING EASILY ANNOYED OR IRRITABLE: NOT AT ALL

## 2024-06-12 ASSESSMENT — PATIENT HEALTH QUESTIONNAIRE - PHQ9
2. FEELING DOWN, DEPRESSED OR HOPELESS: NOT AT ALL
SUM OF ALL RESPONSES TO PHQ QUESTIONS 1-9: 0
SUM OF ALL RESPONSES TO PHQ9 QUESTIONS 1 & 2: 0
SUM OF ALL RESPONSES TO PHQ QUESTIONS 1-9: 0
1. LITTLE INTEREST OR PLEASURE IN DOING THINGS: NOT AT ALL
SUM OF ALL RESPONSES TO PHQ QUESTIONS 1-9: 0
SUM OF ALL RESPONSES TO PHQ QUESTIONS 1-9: 0

## 2024-06-12 ASSESSMENT — ENCOUNTER SYMPTOMS
RESPIRATORY NEGATIVE: 1
GASTROINTESTINAL NEGATIVE: 1
ALLERGIC/IMMUNOLOGIC NEGATIVE: 1
EYES NEGATIVE: 1

## 2024-06-12 NOTE — PATIENT INSTRUCTIONS
Clean under arm on right with benzoyl peroxide twice a day  Take antibiotic with food   May use baking soda if needed for deodorant

## 2024-06-12 NOTE — PROGRESS NOTES
Della Hernandez (:  1940) is a 83 y.o. female,Established patient, here for evaluation of the following chief complaint(s):  Cyst (Under right arm)      Assessment & Plan     Diagnoses and all orders for this visit:  Hidradenitis suppurativa  -     doxycycline hyclate (VIBRAMYCIN) 100 MG capsule; Take 1 capsule by mouth 2 times daily for 10 days  -     Benzoyl Peroxide 2.5 % LIQD; Apply 1 Application topically 2 times daily at 0800 and 1400          Subjective   HPI  Presents today with a sore under right arm for the last 4 days after attending exercise class used dirty band. Been using topical ointments with peroxide without benefit    Review of Systems   Constitutional: Negative.    HENT: Negative.     Eyes: Negative.    Respiratory: Negative.     Cardiovascular: Negative.    Gastrointestinal: Negative.    Endocrine: Negative.    Genitourinary: Negative.    Musculoskeletal: Negative.    Skin:  Positive for rash.   Allergic/Immunologic: Negative.    Neurological: Negative.    Hematological: Negative.    Psychiatric/Behavioral: Negative.       Vitals:    24 1540   BP: 126/72   Pulse: 82   SpO2: 98%      BP Readings from Last 3 Encounters:   24 126/72   24 122/82   24 128/70      Wt Readings from Last 3 Encounters:   24 84.4 kg (186 lb)   24 83.6 kg (184 lb 6.4 oz)   24 83.6 kg (184 lb 6.4 oz)      Past Medical History:   Diagnosis Date    Allergic rhinitis     Asthma     Bilateral carpal tunnel syndrome 2015    Attached Notes    Procedures by Vik Chavez MD at 2015 4:21 PM    Author: Vik Chavez MD Service: Physical Medicine and Rehabilitation Author Type: Physician   Filed: 2015 12:22 AM Note Time: 2015 4:21 PM Note Type: Procedures   Status: Signed : Vik Chavez MD (Physician)    Pre-procedure Diagnoses   1. Paresthesias [R20.2]        Post-procedure Diagnoses     Chronic back pain     COPD (chronic  78

## 2024-06-12 NOTE — TELEPHONE ENCOUNTER
Patient was advised insurance will not pay for prescription. Prescription will be $300 out of pocket. Requests prescription to be sent to [facility].    Recent Visits  Date Type Provider Dept   05/03/24 Office Visit Jennifer Arshad DO [facility]   04/09/24 Office Visit Jennifer Arshad, DO [facility]   02/05/24 Office Visit Jennifer Arshad, DO [facility]   11/13/23 Office Visit Jennifer Arshad DO [facility]   09/26/23 Office Visit Jennifer Arshad, DO [facility]   09/18/23 Office Visit Jennifer Arshad DO [facility]   09/11/23 Office Visit Jn Stovall MD [facility]   Showing recent visits within past 540 days with a meds authorizing provider and meeting all other requirements  Today's Visits  Date Type Provider Dept   06/12/24 Office Visit Naz Barry APRN - CNP [facility]   Showing today's visits with a meds authorizing provider and meeting all other requirements  Future Appointments  Date Type Provider Dept   07/01/24 Appointment Jennifer Arshad DO [facility]   Showing future appointments within next 150 days with a meds authorizing provider and meeting all other requirements     6/12/2024

## 2024-08-20 DIAGNOSIS — I10 ESSENTIAL HYPERTENSION: ICD-10-CM

## 2024-08-21 ENCOUNTER — HOSPITAL ENCOUNTER (OUTPATIENT)
Dept: ULTRASOUND IMAGING | Age: 84
Discharge: HOME | End: 2024-08-21
Attending: INTERNAL MEDICINE
Payer: MEDICARE

## 2024-08-21 DIAGNOSIS — E04.2 MULTIPLE THYROID NODULES: ICD-10-CM

## 2024-08-21 PROCEDURE — 76536 US EXAM OF HEAD AND NECK: CPT

## 2024-08-26 ENCOUNTER — TELEPHONE (OUTPATIENT)
Dept: ENDOCRINOLOGY | Age: 84
End: 2024-08-26

## 2024-08-26 DIAGNOSIS — N18.31 STAGE 3A CHRONIC KIDNEY DISEASE (HCC): ICD-10-CM

## 2024-08-26 DIAGNOSIS — E04.1 THYROID NODULE: ICD-10-CM

## 2024-08-26 DIAGNOSIS — E04.2 MULTIPLE THYROID NODULES: Primary | ICD-10-CM

## 2024-08-26 RX ORDER — OLMESARTAN MEDOXOMIL 40 MG/1
40 TABLET ORAL DAILY
Qty: 90 TABLET | Refills: 1 | Status: SHIPPED | OUTPATIENT
Start: 2024-08-26 | End: 2025-02-22

## 2024-08-26 NOTE — TELEPHONE ENCOUNTER
Pt calling and states her kidney Dr is treating her for her parathyroid w/ Vitamin D \"to see if that helps\"  Pt is asking if she needs to do any labs for Dr Calhoun because she has appt next week? Pt states she already did her US thyroid    CB#[phone]

## 2024-08-27 NOTE — TELEPHONE ENCOUNTER
Left VM for patient that there are lab orders in the system for her to do before her upcoming appt.

## 2024-08-29 DIAGNOSIS — E04.1 THYROID NODULE: ICD-10-CM

## 2024-08-29 DIAGNOSIS — N18.31 STAGE 3A CHRONIC KIDNEY DISEASE (HCC): ICD-10-CM

## 2024-08-29 LAB
25(OH)D3 SERPL-MCNC: 26.6 NG/ML
PTH-INTACT SERPL-MCNC: 51.7 PG/ML (ref 14–72)
T4 FREE SERPL-MCNC: 1.3 NG/DL (ref 0.9–1.8)
TSH SERPL DL<=0.005 MIU/L-ACNC: 2.38 UIU/ML (ref 0.27–4.2)

## 2024-09-05 ENCOUNTER — OFFICE VISIT (OUTPATIENT)
Dept: ENDOCRINOLOGY | Age: 84
End: 2024-09-05

## 2024-09-05 VITALS
HEIGHT: 62 IN | SYSTOLIC BLOOD PRESSURE: 142 MMHG | BODY MASS INDEX: 35.33 KG/M2 | HEART RATE: 71 BPM | RESPIRATION RATE: 16 BRPM | DIASTOLIC BLOOD PRESSURE: 74 MMHG | WEIGHT: 192 LBS

## 2024-09-05 DIAGNOSIS — I10 ESSENTIAL HYPERTENSION: ICD-10-CM

## 2024-09-05 DIAGNOSIS — E78.2 MIXED HYPERLIPIDEMIA: ICD-10-CM

## 2024-09-05 DIAGNOSIS — M81.0 AGE-RELATED OSTEOPOROSIS WITHOUT CURRENT PATHOLOGICAL FRACTURE: ICD-10-CM

## 2024-09-05 DIAGNOSIS — E55.9 VITAMIN D DEFICIENCY: ICD-10-CM

## 2024-09-05 DIAGNOSIS — R73.03 PREDIABETES: ICD-10-CM

## 2024-09-05 DIAGNOSIS — E04.2 MULTIPLE THYROID NODULES: Primary | ICD-10-CM

## 2024-09-05 DIAGNOSIS — N18.31 STAGE 3A CHRONIC KIDNEY DISEASE (HCC): ICD-10-CM

## 2024-09-05 RX ORDER — MULTIVIT-MIN/IRON/FOLIC ACID/K 18-600-40
CAPSULE ORAL
COMMUNITY

## 2024-09-05 NOTE — PROGRESS NOTES
Imidazole Antifungals Nausea And Vomiting and Other (See Comments)    Quinolones Nausea And Vomiting       OBJECTIVE:   BP (!) 142/74   Pulse 71   Resp 16   Ht 1.575 m (5' 2\")   Wt 87.1 kg (192 lb)   BMI 35.12 kg/m²   Wt Readings from Last 3 Encounters:   09/05/24 87.1 kg (192 lb)   06/12/24 84.4 kg (186 lb)   05/03/24 83.6 kg (184 lb 6.4 oz)       Physical Exam:  Constitutional: no acute distress, well appearing, well nourished  Psychiatric: oriented to person, place and time, judgement, insight and normal, recent and remote memory and intact and mood, affect are normal  Skin: skin and subcutaneous tissue is normal without mass, normal turgor  Head and Face: examination of head and face revealed no abnormalities  Eyes: no lid or conjunctival swelling, no erythema or discharge, pupils are normal, equal, round.  Ears/Nose: external inspection of ears and nose revealed no abnormalities, hearing is grossly normal  Oropharynx/Mouth/Face: lips, tongue and gums are normal with no lesions, the voice quality was normal  Neck: neck is supple and symmetric, with midline trachea and no masses, thyroid is enlarged  Lymphatics: normal cervical lymph nodes, normal supraclavicular nodes  Pulmonary: no increased work of breathing or signs of respiratory distress, lungs are clear to auscultation  Cardiovascular: normal heart rate and rhythm, normal S1 and S2, Neurological: normal coordination, normal general cortical function    Lab Review:  Lab Results   Component Value Date/Time    TSH 2.38 08/29/2024 11:33 AM    TSH 1.39 08/28/2023 12:38 PM    TSH 1.67 02/02/2023 12:56 PM    TSH 1.95 04/19/2022 01:22 PM    TSH 2.18 06/22/2020 09:43 AM    TSH 1.43 08/05/2013 03:12 PM     Lab Results   Component Value Date/Time    T4FREE 1.3 08/29/2024 11:33 AM        ASSESSMENT/PLAN:    -- left lobe thyroid nodule.  With 1.6 cm dominant thyroid nodule in the left lobe identified.  Size increased to 2.4 cm in feb 2023 imaging fine-needle

## 2024-09-17 DIAGNOSIS — Z91.09 ENVIRONMENTAL ALLERGIES: ICD-10-CM

## 2024-09-17 DIAGNOSIS — L85.3 DRY SKIN DERMATITIS: ICD-10-CM

## 2024-09-17 RX ORDER — HYDROCORTISONE 25 MG/G
CREAM TOPICAL
Qty: 28 G | Refills: 3 | Status: CANCELLED | OUTPATIENT
Start: 2024-09-17

## 2024-09-17 RX ORDER — ALBUTEROL SULFATE 90 UG/1
2 INHALANT RESPIRATORY (INHALATION) EVERY 6 HOURS PRN
Qty: 18 G | Refills: 3 | Status: SHIPPED | OUTPATIENT
Start: 2024-09-17

## 2024-09-17 RX ORDER — FLUTICASONE PROPIONATE 50 MCG
2 SPRAY, SUSPENSION (ML) NASAL DAILY
Qty: 16 G | Refills: 0 | Status: SHIPPED | OUTPATIENT
Start: 2024-09-17

## 2024-09-17 RX ORDER — HYDROCORTISONE 2.5 %
CREAM (GRAM) TOPICAL
Qty: 28 G | Refills: 1 | Status: SHIPPED | OUTPATIENT
Start: 2024-09-17

## 2024-09-19 RX ORDER — HYDROCORTISONE 25 MG/G
CREAM TOPICAL
Qty: 28 G | Refills: 0 | Status: SHIPPED | OUTPATIENT
Start: 2024-09-19

## 2024-10-15 ENCOUNTER — HOSPITAL ENCOUNTER (OUTPATIENT)
Dept: GENERAL RADIOLOGY | Age: 84
Discharge: HOME | End: 2024-10-15
Attending: INTERNAL MEDICINE
Payer: MEDICARE

## 2024-10-15 ENCOUNTER — TELEPHONE (OUTPATIENT)
Dept: INTERVENTIONAL RADIOLOGY/VASCULAR | Age: 84
End: 2024-10-15

## 2024-10-15 DIAGNOSIS — M81.0 AGE-RELATED OSTEOPOROSIS WITHOUT CURRENT PATHOLOGICAL FRACTURE: ICD-10-CM

## 2024-10-15 DIAGNOSIS — E04.2 MULTIPLE THYROID NODULES: ICD-10-CM

## 2024-10-15 DIAGNOSIS — E55.9 VITAMIN D DEFICIENCY: ICD-10-CM

## 2024-10-15 PROCEDURE — 77080 DXA BONE DENSITY AXIAL: CPT

## 2024-10-22 ENCOUNTER — HOSPITAL ENCOUNTER (OUTPATIENT)
Dept: ULTRASOUND IMAGING | Age: 84
Discharge: HOME | End: 2024-10-22
Payer: MEDICARE

## 2024-10-22 VITALS
HEIGHT: 61 IN | RESPIRATION RATE: 18 BRPM | TEMPERATURE: 96.7 F | SYSTOLIC BLOOD PRESSURE: 156 MMHG | OXYGEN SATURATION: 98 % | WEIGHT: 190 LBS | BODY MASS INDEX: 35.87 KG/M2 | DIASTOLIC BLOOD PRESSURE: 84 MMHG | HEART RATE: 84 BPM

## 2024-10-22 DIAGNOSIS — E55.9 VITAMIN D DEFICIENCY: ICD-10-CM

## 2024-10-22 DIAGNOSIS — M81.0 AGE-RELATED OSTEOPOROSIS WITHOUT CURRENT PATHOLOGICAL FRACTURE: ICD-10-CM

## 2024-10-22 DIAGNOSIS — E04.2 MULTIPLE THYROID NODULES: ICD-10-CM

## 2024-10-22 PROCEDURE — 88305 TISSUE EXAM BY PATHOLOGIST: CPT

## 2024-10-22 PROCEDURE — 88173 CYTOPATH EVAL FNA REPORT: CPT

## 2024-10-22 PROCEDURE — 2500000003 HC RX 250 WO HCPCS: Performed by: INTERNAL MEDICINE

## 2024-10-22 PROCEDURE — 10005 FNA BX W/US GDN 1ST LES: CPT

## 2024-10-22 RX ORDER — LIDOCAINE HYDROCHLORIDE 10 MG/ML
5 INJECTION, SOLUTION EPIDURAL; INFILTRATION; INTRACAUDAL; PERINEURAL ONCE
Status: COMPLETED | OUTPATIENT
Start: 2024-10-22 | End: 2024-10-22

## 2024-10-22 RX ADMIN — LIDOCAINE HYDROCHLORIDE ANHYDROUS 5 ML: 10 INJECTION, SOLUTION INFILTRATION at 08:45

## 2024-10-22 ASSESSMENT — PAIN - FUNCTIONAL ASSESSMENT: PAIN_FUNCTIONAL_ASSESSMENT: NONE - DENIES PAIN

## 2024-10-22 NOTE — PROGRESS NOTES
Pt arrives to pre procedure area in stable condition from home. Vital signs stable.  Assessment completed see flow sheet.   Procedure explained to pt who states understanding and questions answered.

## 2024-10-23 ENCOUNTER — TELEPHONE (OUTPATIENT)
Dept: INTERVENTIONAL RADIOLOGY/VASCULAR | Age: 84
End: 2024-10-23

## 2024-11-11 DIAGNOSIS — K21.9 GASTROESOPHAGEAL REFLUX DISEASE WITHOUT ESOPHAGITIS: ICD-10-CM

## 2024-11-11 RX ORDER — FAMOTIDINE 20 MG/1
TABLET, FILM COATED ORAL
Qty: 180 TABLET | Refills: 3 | OUTPATIENT
Start: 2024-11-11

## 2024-11-14 ENCOUNTER — OFFICE VISIT (OUTPATIENT)
Dept: INTERNAL MEDICINE CLINIC | Age: 84
End: 2024-11-14

## 2024-11-14 VITALS
OXYGEN SATURATION: 98 % | HEIGHT: 60 IN | WEIGHT: 191 LBS | DIASTOLIC BLOOD PRESSURE: 66 MMHG | SYSTOLIC BLOOD PRESSURE: 114 MMHG | HEART RATE: 75 BPM | BODY MASS INDEX: 37.5 KG/M2

## 2024-11-14 DIAGNOSIS — M79.661 RIGHT CALF PAIN: ICD-10-CM

## 2024-11-14 DIAGNOSIS — Z00.00 MEDICARE ANNUAL WELLNESS VISIT, SUBSEQUENT: Primary | ICD-10-CM

## 2024-11-14 DIAGNOSIS — Z23 NEED FOR VACCINATION: ICD-10-CM

## 2024-11-14 DIAGNOSIS — I27.20 PULMONARY HYPERTENSION (HCC): ICD-10-CM

## 2024-11-14 RX ORDER — ZOSTER VACCINE RECOMBINANT, ADJUVANTED 50 MCG/0.5
0.5 KIT INTRAMUSCULAR SEE ADMIN INSTRUCTIONS
Qty: 0.5 ML | Refills: 0 | Status: SHIPPED | OUTPATIENT
Start: 2024-11-14 | End: 2025-05-13

## 2024-11-14 RX ORDER — CALCIUM CARBONATE 10GR (648 MG) 648 MG/1
648 TABLET ORAL DAILY
COMMUNITY

## 2024-11-14 SDOH — ECONOMIC STABILITY: FOOD INSECURITY: WITHIN THE PAST 12 MONTHS, THE FOOD YOU BOUGHT JUST DIDN'T LAST AND YOU DIDN'T HAVE MONEY TO GET MORE.: NEVER TRUE

## 2024-11-14 SDOH — ECONOMIC STABILITY: FOOD INSECURITY: WITHIN THE PAST 12 MONTHS, YOU WORRIED THAT YOUR FOOD WOULD RUN OUT BEFORE YOU GOT MONEY TO BUY MORE.: NEVER TRUE

## 2024-11-14 SDOH — HEALTH STABILITY: PHYSICAL HEALTH: ON AVERAGE, HOW MANY DAYS PER WEEK DO YOU ENGAGE IN MODERATE TO STRENUOUS EXERCISE (LIKE A BRISK WALK)?: 2 DAYS

## 2024-11-14 SDOH — HEALTH STABILITY: PHYSICAL HEALTH: ON AVERAGE, HOW MANY MINUTES DO YOU ENGAGE IN EXERCISE AT THIS LEVEL?: 10 MIN

## 2024-11-14 SDOH — ECONOMIC STABILITY: INCOME INSECURITY: HOW HARD IS IT FOR YOU TO PAY FOR THE VERY BASICS LIKE FOOD, HOUSING, MEDICAL CARE, AND HEATING?: NOT HARD AT ALL

## 2024-11-14 ASSESSMENT — LIFESTYLE VARIABLES
HOW OFTEN DO YOU HAVE A DRINK CONTAINING ALCOHOL: 1
HOW OFTEN DO YOU HAVE SIX OR MORE DRINKS ON ONE OCCASION: 1
HOW MANY STANDARD DRINKS CONTAINING ALCOHOL DO YOU HAVE ON A TYPICAL DAY: 0

## 2024-11-14 NOTE — PROGRESS NOTES
(BENICAR) 40 MG tablet Take 1 tablet by mouth daily Yes Jennifer Arshad DO   Benzoyl Peroxide 2.5 % LIQD Apply 1 Application topically 2 times daily at 0800 and 1400 Yes Naz Barry APRN - CNP   naphazoline-pheniramine (NAPHCON-A) 0.025-0.3 % ophthalmic solution Place 1 drop into both eyes 4 times daily Yes Jennifer Arshad DO   diclofenac sodium (VOLTAREN) 1 % GEL Apply 2 g topically 4 times daily Yes Jennifer Arshad DO   spironolactone (ALDACTONE) 25 MG tablet TAKE 1 TABLET DAILY  Patient taking differently: TAKE 1 TABLET DAILY- Mon, Wed, Fri Yes Jennifer Arshad DO   Diaper Rash Products (DESITIN MULTI-PURPOSE HEALING) OINT Apply to affected areas daily as needed Yes Jennifer Arshad DO   famotidine (PEPCID) 20 MG tablet Take 1 tablet by mouth 2 times daily as needed (reflux) Yes Jn Stovall MD   cetirizine (ZYRTEC) 5 MG tablet Take 1 tablet by mouth as needed for Allergies Yes Susan Coughlin MD   fluticasone-umeclidin-vilant (TRELEGY ELLIPTA) 100-62.5-25 MCG/INH AEPB Inhale 1 puff into the lungs daily  Patient taking differently: Inhale 1 puff into the lungs as needed Yes Lilli Conley MD   ketoconazole (NIZORAL) 2 % cream APPLY TOPICALLY UNDER BREAST OR IN GROIN TWICE A DAY AS NEEDED FOR RASH Yes Susan Coughlin MD   calcium carbonate 648 MG TABS Take 2 tablets by mouth daily  ProviderRas MD   prednisoLONE acetate (PRED FORTE) 1 % ophthalmic suspension INSTILL 1 DROP INTO BOTH EYES 4 TIMES A DAY FOR 7 DAYS  Provider, MD Ras       CareTeam (Including outside providers/suppliers regularly involved in providing care):   Patient Care Team:  Jennifer Arshad DO as PCP - General (Family Medicine)  Jennifer Arshad DO as PCP - Empaneled Provider  Fe Mendez MD as Consulting Physician (Gastroenterology)  Chantel Calhoun MD as Consulting Physician (Endocrinology, Diabetes, & Metabolism)  Yo Camargo MD as Consulting Physician (Cardiovascular Disease)  Nadia,

## 2024-11-19 ENCOUNTER — HOSPITAL ENCOUNTER (OUTPATIENT)
Age: 84
Discharge: HOME | End: 2024-11-19
Attending: STUDENT IN AN ORGANIZED HEALTH CARE EDUCATION/TRAINING PROGRAM
Payer: MEDICARE

## 2024-11-19 ENCOUNTER — HOSPITAL ENCOUNTER (OUTPATIENT)
Dept: VASCULAR LAB | Age: 84
Discharge: HOME | End: 2024-11-21
Attending: STUDENT IN AN ORGANIZED HEALTH CARE EDUCATION/TRAINING PROGRAM
Payer: MEDICARE

## 2024-11-19 DIAGNOSIS — M79.661 RIGHT CALF PAIN: ICD-10-CM

## 2024-11-19 LAB — CK SERPL-CCNC: 97 U/L (ref 26–192)

## 2024-11-19 PROCEDURE — 82550 ASSAY OF CK (CPK): CPT

## 2024-11-19 PROCEDURE — 93971 EXTREMITY STUDY: CPT

## 2024-11-19 PROCEDURE — 36415 COLL VENOUS BLD VENIPUNCTURE: CPT

## 2024-12-05 ENCOUNTER — OFFICE VISIT (OUTPATIENT)
Dept: ORTHOPEDIC SURGERY | Age: 84
End: 2024-12-05
Payer: MEDICARE

## 2024-12-05 VITALS — WEIGHT: 191 LBS | HEIGHT: 60 IN | BODY MASS INDEX: 37.5 KG/M2

## 2024-12-05 DIAGNOSIS — M79.671 RIGHT FOOT PAIN: Primary | ICD-10-CM

## 2024-12-05 PROCEDURE — 1036F TOBACCO NON-USER: CPT | Performed by: PHYSICIAN ASSISTANT

## 2024-12-05 PROCEDURE — MISCD124 DARCO POST-OP SHOE BRACE (RETAIL): Performed by: PHYSICIAN ASSISTANT

## 2024-12-05 PROCEDURE — G8484 FLU IMMUNIZE NO ADMIN: HCPCS | Performed by: PHYSICIAN ASSISTANT

## 2024-12-05 PROCEDURE — 1123F ACP DISCUSS/DSCN MKR DOCD: CPT | Performed by: PHYSICIAN ASSISTANT

## 2024-12-05 PROCEDURE — 1090F PRES/ABSN URINE INCON ASSESS: CPT | Performed by: PHYSICIAN ASSISTANT

## 2024-12-05 PROCEDURE — G8417 CALC BMI ABV UP PARAM F/U: HCPCS | Performed by: PHYSICIAN ASSISTANT

## 2024-12-05 PROCEDURE — G8427 DOCREV CUR MEDS BY ELIG CLIN: HCPCS | Performed by: PHYSICIAN ASSISTANT

## 2024-12-05 PROCEDURE — 1125F AMNT PAIN NOTED PAIN PRSNT: CPT | Performed by: PHYSICIAN ASSISTANT

## 2024-12-05 PROCEDURE — 99213 OFFICE O/P EST LOW 20 MIN: CPT | Performed by: PHYSICIAN ASSISTANT

## 2024-12-05 PROCEDURE — G8399 PT W/DXA RESULTS DOCUMENT: HCPCS | Performed by: PHYSICIAN ASSISTANT

## 2024-12-05 PROCEDURE — 1159F MED LIST DOCD IN RCRD: CPT | Performed by: PHYSICIAN ASSISTANT

## 2024-12-05 NOTE — PROGRESS NOTES
application.  They were instructed to contact the office immediately should the equipment result in increased pain, decreased sensation, increased swelling or worsening of the condition.         Follow up- Dr Martin in 1 week.   Call or return to clinic if these symptoms worsen or fail to improve as anticipated.    I have spent 23 minutes providing this service today, to include the time spent seeing the patient, documenting, and reviewing chart excluding any separately billed procedures.                                                    Murtaza Pollard PA-C   Senior Physician Assistant   [facility]                                         Disclaimer:  This note was generated with use of a verbal recognition program (DRAGON) and an attempt was made to check for errors.  It is possible that there are still dictated errors within this office note.  If so, please bring any significant errors to my attention for an addendum.  All efforts were made to ensure that this office note is accurate.

## 2025-01-12 DIAGNOSIS — I10 ESSENTIAL HYPERTENSION: ICD-10-CM

## 2025-01-13 RX ORDER — OLMESARTAN MEDOXOMIL 40 MG/1
40 TABLET ORAL DAILY
Qty: 90 TABLET | Refills: 1 | Status: SHIPPED | OUTPATIENT
Start: 2025-01-13 | End: 2025-07-12

## 2025-03-04 DIAGNOSIS — Z91.09 ENVIRONMENTAL ALLERGIES: ICD-10-CM

## 2025-03-04 DIAGNOSIS — L85.3 DRY SKIN DERMATITIS: ICD-10-CM

## 2025-03-05 RX ORDER — HYDROCORTISONE 25 MG/G
CREAM TOPICAL
Qty: 56.7 G | Refills: 1 | Status: SHIPPED | OUTPATIENT
Start: 2025-03-05

## 2025-03-05 RX ORDER — HYDROCORTISONE 25 MG/G
CREAM TOPICAL
Qty: 28 G | Refills: 0 | Status: SHIPPED | OUTPATIENT
Start: 2025-03-05

## 2025-03-05 RX ORDER — FLUTICASONE PROPIONATE 50 MCG
2 SPRAY, SUSPENSION (ML) NASAL DAILY
Qty: 16 G | Refills: 0 | Status: SHIPPED | OUTPATIENT
Start: 2025-03-05

## 2025-03-05 RX ORDER — FLUTICASONE PROPIONATE 50 MCG
SPRAY, SUSPENSION (ML) NASAL
Qty: 16 G | Refills: 0 | Status: SHIPPED | OUTPATIENT
Start: 2025-03-05

## 2025-04-18 DIAGNOSIS — I10 ESSENTIAL HYPERTENSION: ICD-10-CM

## 2025-04-18 RX ORDER — SPIRONOLACTONE 25 MG/1
TABLET ORAL
Qty: 90 TABLET | Refills: 0 | Status: SHIPPED | OUTPATIENT
Start: 2025-04-18

## 2025-04-18 NOTE — TELEPHONE ENCOUNTER
Recent Visits  Date Type Provider Dept   11/14/24 Office Visit Jennifer Arshad,  [facility]   06/12/24 Office Visit Naz Barry APRN - CNP [facility]   05/03/24 Office Visit Jennifer Arshad DO [facility]   04/09/24 Office Visit Jennifer Arshad,  [facility]   02/05/24 Office Visit Jennifer Arshad DO [facility]   11/13/23 Office Visit Jennifer Arshad DO [facility]   Showing recent visits within past 540 days with a meds authorizing provider and meeting all other requirements  Future Appointments  No visits were found meeting these conditions.  Showing future appointments within next 150 days with a meds authorizing provider and meeting all other requirements     11/14/2024     Requested Prescriptions     Pending Prescriptions Disp Refills    spironolactone (ALDACTONE) 25 MG tablet [Pharmacy Med Name: Spironolactone 25 MG Oral Tablet] 90 tablet 0     Sig: TAKE 1 TABLET DAILY- Mon, Wed, Fri

## 2025-04-22 ENCOUNTER — OFFICE VISIT (OUTPATIENT)
Dept: INTERNAL MEDICINE CLINIC | Age: 85
End: 2025-04-22
Payer: MEDICARE

## 2025-04-22 VITALS
HEART RATE: 70 BPM | DIASTOLIC BLOOD PRESSURE: 78 MMHG | OXYGEN SATURATION: 99 % | BODY MASS INDEX: 36.87 KG/M2 | WEIGHT: 187.8 LBS | SYSTOLIC BLOOD PRESSURE: 128 MMHG | HEIGHT: 60 IN

## 2025-04-22 DIAGNOSIS — R16.0 LIVER MASS: ICD-10-CM

## 2025-04-22 DIAGNOSIS — I27.20 PULMONARY HYPERTENSION (HCC): ICD-10-CM

## 2025-04-22 DIAGNOSIS — R10.13 EPIGASTRIC PAIN: Primary | ICD-10-CM

## 2025-04-22 DIAGNOSIS — Z00.00 MEDICARE ANNUAL WELLNESS VISIT, SUBSEQUENT: ICD-10-CM

## 2025-04-22 DIAGNOSIS — N18.31 STAGE 3A CHRONIC KIDNEY DISEASE (HCC): ICD-10-CM

## 2025-04-22 DIAGNOSIS — K21.9 GASTROESOPHAGEAL REFLUX DISEASE WITHOUT ESOPHAGITIS: ICD-10-CM

## 2025-04-22 DIAGNOSIS — E04.1 THYROID NODULE: ICD-10-CM

## 2025-04-22 DIAGNOSIS — E78.2 MIXED HYPERLIPIDEMIA: ICD-10-CM

## 2025-04-22 DIAGNOSIS — E55.9 VITAMIN D DEFICIENCY: ICD-10-CM

## 2025-04-22 DIAGNOSIS — R73.03 PREDIABETES: ICD-10-CM

## 2025-04-22 PROCEDURE — G0439 PPPS, SUBSEQ VISIT: HCPCS | Performed by: INTERNAL MEDICINE

## 2025-04-22 PROCEDURE — 1036F TOBACCO NON-USER: CPT | Performed by: INTERNAL MEDICINE

## 2025-04-22 PROCEDURE — G8417 CALC BMI ABV UP PARAM F/U: HCPCS | Performed by: INTERNAL MEDICINE

## 2025-04-22 PROCEDURE — G8427 DOCREV CUR MEDS BY ELIG CLIN: HCPCS | Performed by: INTERNAL MEDICINE

## 2025-04-22 PROCEDURE — 1159F MED LIST DOCD IN RCRD: CPT | Performed by: INTERNAL MEDICINE

## 2025-04-22 PROCEDURE — 1090F PRES/ABSN URINE INCON ASSESS: CPT | Performed by: INTERNAL MEDICINE

## 2025-04-22 PROCEDURE — 3074F SYST BP LT 130 MM HG: CPT | Performed by: INTERNAL MEDICINE

## 2025-04-22 PROCEDURE — G8399 PT W/DXA RESULTS DOCUMENT: HCPCS | Performed by: INTERNAL MEDICINE

## 2025-04-22 PROCEDURE — 99214 OFFICE O/P EST MOD 30 MIN: CPT | Performed by: INTERNAL MEDICINE

## 2025-04-22 PROCEDURE — 3078F DIAST BP <80 MM HG: CPT | Performed by: INTERNAL MEDICINE

## 2025-04-22 PROCEDURE — 1160F RVW MEDS BY RX/DR IN RCRD: CPT | Performed by: INTERNAL MEDICINE

## 2025-04-22 PROCEDURE — 1123F ACP DISCUSS/DSCN MKR DOCD: CPT | Performed by: INTERNAL MEDICINE

## 2025-04-22 RX ORDER — CALCITRIOL 0.25 UG/1
0.25 CAPSULE, LIQUID FILLED ORAL
COMMUNITY
Start: 2025-03-12

## 2025-04-22 SDOH — ECONOMIC STABILITY: FOOD INSECURITY: WITHIN THE PAST 12 MONTHS, THE FOOD YOU BOUGHT JUST DIDN'T LAST AND YOU DIDN'T HAVE MONEY TO GET MORE.: NEVER TRUE

## 2025-04-22 SDOH — ECONOMIC STABILITY: FOOD INSECURITY: WITHIN THE PAST 12 MONTHS, YOU WORRIED THAT YOUR FOOD WOULD RUN OUT BEFORE YOU GOT MONEY TO BUY MORE.: NEVER TRUE

## 2025-04-22 ASSESSMENT — PATIENT HEALTH QUESTIONNAIRE - PHQ9
1. LITTLE INTEREST OR PLEASURE IN DOING THINGS: NOT AT ALL
SUM OF ALL RESPONSES TO PHQ QUESTIONS 1-9: 0
SUM OF ALL RESPONSES TO PHQ QUESTIONS 1-9: 0
2. FEELING DOWN, DEPRESSED OR HOPELESS: NOT AT ALL
SUM OF ALL RESPONSES TO PHQ QUESTIONS 1-9: 0
SUM OF ALL RESPONSES TO PHQ QUESTIONS 1-9: 0

## 2025-04-22 ASSESSMENT — LIFESTYLE VARIABLES
HOW OFTEN DO YOU HAVE A DRINK CONTAINING ALCOHOL: NEVER
HOW MANY STANDARD DRINKS CONTAINING ALCOHOL DO YOU HAVE ON A TYPICAL DAY: PATIENT DOES NOT DRINK

## 2025-04-22 NOTE — PROGRESS NOTES
Assessment and screening values have been reviewed and are found in Flowsheets. The following problems were reviewed today and where indicated follow up appointments were made and/or referrals ordered.    Positive Risk Factor Screenings with Interventions:    Fall Risk:  Do you feel unsteady or are you worried about falling? : (!) yes  2 or more falls in past year?: no  Fall with injury in past year?: no     Interventions:    Reviewed medications, home hazards, visual acuity, and co-morbidities that can increase risk for falls             Inactivity:  On average, how many days per week do you engage in moderate to strenuous exercise (like a brisk walk)?: 2 days (!) Abnormal  On average, how many minutes do you engage in exercise at this level?: 10 min  Interventions:  Recommendations: patient agrees to exercise for at least 150 minutes/week    Poor Eating Habits/Diet:  Do you eat balanced/healthy meals regularly?: (!) No  Interventions:  low carbohydrate diet    Abnormal BMI (obese):  Body mass index is 36.68 kg/m². (!) Abnormal  Interventions:  low carbohydrate diet                           Objective   Vitals:    04/22/25 1347   BP: 128/78   Pulse: 70   SpO2: 99%   Weight: 85.2 kg (187 lb 12.8 oz)   Height: 1.524 m (5')      Body mass index is 36.68 kg/m².        General Appearance: alert and oriented to person, place and time, well developed and well- nourished, in no acute distress  Skin: warm and dry, no rash or erythema  Head: normocephalic and atraumatic  Eyes: pupils equal, round, and reactive to light, extraocular eye movements intact, conjunctivae normal  ENT: tympanic membrane, external ear and ear canal normal bilaterally, nose without deformity, nasal mucosa and turbinates normal without polyps  Neck: supple and non-tender without mass, no thyromegaly or thyroid nodules, no cervical lymphadenopathy  Pulmonary/Chest: clear to auscultation bilaterally- no wheezes, rales or rhonchi, normal air movement, no

## 2025-04-24 ENCOUNTER — HOSPITAL ENCOUNTER (OUTPATIENT)
Dept: CT IMAGING | Age: 85
Discharge: HOME | End: 2025-04-24
Payer: MEDICARE

## 2025-04-24 DIAGNOSIS — R10.13 EPIGASTRIC PAIN: ICD-10-CM

## 2025-04-24 DIAGNOSIS — R16.0 LIVER MASS: ICD-10-CM

## 2025-04-24 PROCEDURE — 74177 CT ABD & PELVIS W/CONTRAST: CPT

## 2025-04-24 PROCEDURE — 6360000004 HC RX CONTRAST MEDICATION: Performed by: INTERNAL MEDICINE

## 2025-04-24 RX ORDER — IOPAMIDOL 755 MG/ML
75 INJECTION, SOLUTION INTRAVASCULAR
Status: COMPLETED | OUTPATIENT
Start: 2025-04-24 | End: 2025-04-24

## 2025-04-24 RX ADMIN — IOPAMIDOL 75 ML: 755 INJECTION, SOLUTION INTRAVENOUS at 16:17

## 2025-04-25 ENCOUNTER — HOSPITAL ENCOUNTER (OUTPATIENT)
Dept: ULTRASOUND IMAGING | Age: 85
Discharge: HOME | End: 2025-04-25
Payer: MEDICARE

## 2025-04-25 ENCOUNTER — HOSPITAL ENCOUNTER (OUTPATIENT)
Age: 85
Discharge: HOME | End: 2025-04-25
Payer: MEDICARE

## 2025-04-25 ENCOUNTER — RESULTS FOLLOW-UP (OUTPATIENT)
Dept: INTERNAL MEDICINE CLINIC | Age: 85
End: 2025-04-25

## 2025-04-25 DIAGNOSIS — E55.9 VITAMIN D DEFICIENCY: ICD-10-CM

## 2025-04-25 DIAGNOSIS — R73.03 PREDIABETES: ICD-10-CM

## 2025-04-25 DIAGNOSIS — E04.1 THYROID NODULE: ICD-10-CM

## 2025-04-25 DIAGNOSIS — E78.2 MIXED HYPERLIPIDEMIA: ICD-10-CM

## 2025-04-25 DIAGNOSIS — R10.13 EPIGASTRIC PAIN: ICD-10-CM

## 2025-04-25 LAB
25(OH)D3 SERPL-MCNC: 36.4 NG/ML
ALBUMIN SERPL-MCNC: 4.1 G/DL (ref 3.4–5)
ALBUMIN/GLOB SERPL: 1.2 {RATIO} (ref 1.1–2.2)
ALP SERPL-CCNC: 70 U/L (ref 40–129)
ALT SERPL-CCNC: 13 U/L (ref 10–40)
ANION GAP SERPL CALCULATED.3IONS-SCNC: 11 MMOL/L (ref 3–16)
AST SERPL-CCNC: 19 U/L (ref 15–37)
BASOPHILS # BLD: 0 K/UL (ref 0–0.2)
BASOPHILS NFR BLD: 0.7 %
BILIRUB SERPL-MCNC: 0.8 MG/DL (ref 0–1)
BUN SERPL-MCNC: 13 MG/DL (ref 7–20)
CALCIUM SERPL-MCNC: 9.3 MG/DL (ref 8.3–10.6)
CHLORIDE SERPL-SCNC: 103 MMOL/L (ref 99–110)
CHOLEST SERPL-MCNC: 255 MG/DL (ref 0–199)
CO2 SERPL-SCNC: 24 MMOL/L (ref 21–32)
CREAT SERPL-MCNC: 1.1 MG/DL (ref 0.6–1.2)
DEPRECATED RDW RBC AUTO: 14.7 % (ref 12.4–15.4)
EOSINOPHIL # BLD: 0.3 K/UL (ref 0–0.6)
EOSINOPHIL NFR BLD: 5.4 %
EST. AVERAGE GLUCOSE BLD GHB EST-MCNC: 116.9 MG/DL
GFR SERPLBLD CREATININE-BSD FMLA CKD-EPI: 50 ML/MIN/{1.73_M2}
GLUCOSE SERPL-MCNC: 81 MG/DL (ref 70–99)
HBA1C MFR BLD: 5.7 %
HCT VFR BLD AUTO: 38.1 % (ref 36–48)
HDLC SERPL-MCNC: 75 MG/DL (ref 40–60)
HGB BLD-MCNC: 12.6 G/DL (ref 12–16)
LDLC SERPL CALC-MCNC: 171 MG/DL
LIPASE SERPL-CCNC: 34 U/L (ref 13–60)
LYMPHOCYTES # BLD: 2.6 K/UL (ref 1–5.1)
LYMPHOCYTES NFR BLD: 44.5 %
MCH RBC QN AUTO: 27.5 PG (ref 26–34)
MCHC RBC AUTO-ENTMCNC: 33.1 G/DL (ref 31–36)
MCV RBC AUTO: 83.1 FL (ref 80–100)
MONOCYTES # BLD: 0.4 K/UL (ref 0–1.3)
MONOCYTES NFR BLD: 6.2 %
NEUTROPHILS # BLD: 2.5 K/UL (ref 1.7–7.7)
NEUTROPHILS NFR BLD: 43.2 %
PLATELET # BLD AUTO: 259 K/UL (ref 135–450)
PMV BLD AUTO: 8.9 FL (ref 5–10.5)
POTASSIUM SERPL-SCNC: 4.1 MMOL/L (ref 3.5–5.1)
PROT SERPL-MCNC: 7.6 G/DL (ref 6.4–8.2)
RBC # BLD AUTO: 4.59 M/UL (ref 4–5.2)
SODIUM SERPL-SCNC: 138 MMOL/L (ref 136–145)
TRIGL SERPL-MCNC: 46 MG/DL (ref 0–150)
TSH SERPL DL<=0.005 MIU/L-ACNC: 1.58 UIU/ML (ref 0.27–4.2)
VLDLC SERPL CALC-MCNC: 9 MG/DL
WBC # BLD AUTO: 5.9 K/UL (ref 4–11)

## 2025-04-25 PROCEDURE — 84443 ASSAY THYROID STIM HORMONE: CPT

## 2025-04-25 PROCEDURE — 76536 US EXAM OF HEAD AND NECK: CPT

## 2025-04-25 PROCEDURE — 80061 LIPID PANEL: CPT

## 2025-04-25 PROCEDURE — 85025 COMPLETE CBC W/AUTO DIFF WBC: CPT

## 2025-04-25 PROCEDURE — 83036 HEMOGLOBIN GLYCOSYLATED A1C: CPT

## 2025-04-25 PROCEDURE — 83690 ASSAY OF LIPASE: CPT

## 2025-04-25 PROCEDURE — 80053 COMPREHEN METABOLIC PANEL: CPT

## 2025-04-25 PROCEDURE — 36415 COLL VENOUS BLD VENIPUNCTURE: CPT

## 2025-04-25 PROCEDURE — 82306 VITAMIN D 25 HYDROXY: CPT

## 2025-04-25 NOTE — RESULT ENCOUNTER NOTE
Please let the patient know that results were acceptable, nonobstructing kidney stones and simple liver cysts

## 2025-04-28 ENCOUNTER — RESULTS FOLLOW-UP (OUTPATIENT)
Dept: INTERNAL MEDICINE CLINIC | Age: 85
End: 2025-04-28

## 2025-04-28 DIAGNOSIS — E04.1 THYROID NODULE INCIDENTALLY NOTED ON IMAGING STUDY: Primary | ICD-10-CM

## 2025-04-28 NOTE — RESULT ENCOUNTER NOTE
Please let the patient know that results showed a nodule that needs to be aspirated/biopsied referral to ENT has been initiated

## 2025-05-06 ENCOUNTER — OFFICE VISIT (OUTPATIENT)
Dept: INTERNAL MEDICINE CLINIC | Age: 85
End: 2025-05-06
Payer: MEDICARE

## 2025-05-06 ENCOUNTER — HOSPITAL ENCOUNTER (OUTPATIENT)
Age: 85
Discharge: HOME | End: 2025-05-06
Payer: MEDICARE

## 2025-05-06 ENCOUNTER — HOSPITAL ENCOUNTER (OUTPATIENT)
Dept: GENERAL RADIOLOGY | Age: 85
Discharge: HOME | End: 2025-05-06
Payer: MEDICARE

## 2025-05-06 VITALS
WEIGHT: 186.2 LBS | SYSTOLIC BLOOD PRESSURE: 120 MMHG | HEART RATE: 83 BPM | OXYGEN SATURATION: 97 % | BODY MASS INDEX: 36.56 KG/M2 | HEIGHT: 60 IN | DIASTOLIC BLOOD PRESSURE: 82 MMHG

## 2025-05-06 DIAGNOSIS — E78.2 MIXED HYPERLIPIDEMIA: Primary | ICD-10-CM

## 2025-05-06 DIAGNOSIS — E04.2 MULTIPLE THYROID NODULES: ICD-10-CM

## 2025-05-06 DIAGNOSIS — I27.20 PULMONARY HYPERTENSION (HCC): ICD-10-CM

## 2025-05-06 DIAGNOSIS — M25.551 PAIN OF RIGHT HIP: ICD-10-CM

## 2025-05-06 DIAGNOSIS — R73.03 PREDIABETES: ICD-10-CM

## 2025-05-06 DIAGNOSIS — M70.61 TROCHANTERIC BURSITIS OF RIGHT HIP: ICD-10-CM

## 2025-05-06 DIAGNOSIS — J30.2 SEASONAL ALLERGIES: ICD-10-CM

## 2025-05-06 PROCEDURE — 1090F PRES/ABSN URINE INCON ASSESS: CPT | Performed by: INTERNAL MEDICINE

## 2025-05-06 PROCEDURE — 3079F DIAST BP 80-89 MM HG: CPT | Performed by: INTERNAL MEDICINE

## 2025-05-06 PROCEDURE — G8417 CALC BMI ABV UP PARAM F/U: HCPCS | Performed by: INTERNAL MEDICINE

## 2025-05-06 PROCEDURE — 1036F TOBACCO NON-USER: CPT | Performed by: INTERNAL MEDICINE

## 2025-05-06 PROCEDURE — 96372 THER/PROPH/DIAG INJ SC/IM: CPT | Performed by: INTERNAL MEDICINE

## 2025-05-06 PROCEDURE — G8427 DOCREV CUR MEDS BY ELIG CLIN: HCPCS | Performed by: INTERNAL MEDICINE

## 2025-05-06 PROCEDURE — 99214 OFFICE O/P EST MOD 30 MIN: CPT | Performed by: INTERNAL MEDICINE

## 2025-05-06 PROCEDURE — 3074F SYST BP LT 130 MM HG: CPT | Performed by: INTERNAL MEDICINE

## 2025-05-06 PROCEDURE — 1160F RVW MEDS BY RX/DR IN RCRD: CPT | Performed by: INTERNAL MEDICINE

## 2025-05-06 PROCEDURE — 73502 X-RAY EXAM HIP UNI 2-3 VIEWS: CPT

## 2025-05-06 PROCEDURE — 1123F ACP DISCUSS/DSCN MKR DOCD: CPT | Performed by: INTERNAL MEDICINE

## 2025-05-06 PROCEDURE — G8399 PT W/DXA RESULTS DOCUMENT: HCPCS | Performed by: INTERNAL MEDICINE

## 2025-05-06 PROCEDURE — 20611 DRAIN/INJ JOINT/BURSA W/US: CPT | Performed by: INTERNAL MEDICINE

## 2025-05-06 PROCEDURE — 1159F MED LIST DOCD IN RCRD: CPT | Performed by: INTERNAL MEDICINE

## 2025-05-06 RX ORDER — METHYLPREDNISOLONE ACETATE 40 MG/ML
40 INJECTION, SUSPENSION INTRA-ARTICULAR; INTRALESIONAL; INTRAMUSCULAR; SOFT TISSUE ONCE
Status: COMPLETED | OUTPATIENT
Start: 2025-05-06 | End: 2025-05-06

## 2025-05-06 RX ADMIN — METHYLPREDNISOLONE ACETATE 40 MG: 40 INJECTION, SUSPENSION INTRA-ARTICULAR; INTRALESIONAL; INTRAMUSCULAR; SOFT TISSUE at 15:27

## 2025-05-06 NOTE — PROGRESS NOTES
Della Hernandez (:  1940) is a 84 y.o. female,Established patient, here for evaluation of the following chief complaint(s):  2 Week Follow-Up (Arms bleeding more when getting blood drawn), Abdominal Pain, and right hip pain (Would like a cortisone shot)      Assessment & Plan   ASSESSMENT/PLAN:  1. Mixed hyperlipidemia  2. Multiple thyroid nodules  3. Pulmonary hypertension (HCC)  4. Seasonal allergies  5. Prediabetes  6. Pain of right hip  -     XR HIP RIGHT (2-3 VIEWS); Future  7. Trochanteric bursitis of right hip  -     methylPREDNISolone acetate (DEPO-MEDROL) injection 40 mg; 40 mg, IntraMUSCular, ONCE, 1 dose, On 25 at 1545  -     ARTHROCENTESIS ASPIR&/INJ MAJOR JT/BURSA W/US    Assessment & Plan  1. Prediabetes.  - Blood glucose levels have slightly worsened but remain within the prediabetic range, staying below 6.    2. Hypercholesterolemia.  - LDL cholesterol level is elevated at 171, consistent with previous readings ranging from 142 to 188. HDL cholesterol level is significantly high, providing some protection.    3. Chronic kidney disease, stage 3a.  - Kidney function remains stable at stage 3a.    4. Thyroid nodules.  - Thyroid ultrasound revealed several nodules, including a 3 cm solid nodule on the right side. Referral to Dr. Velez has been made for further evaluation and potential fine needle aspiration biopsy. Biopsy is scheduled for 2025.    5. Hypertension.  - Blood pressure is well-controlled with current medication regimen. Will continue current antihypertensive medications. Chemistry panel will be ordered before next visit.    6. Abdominal issues.  - Advised to start with prune juice, followed by Metamucil with senna, and then Dulcolax if needed. If these measures are ineffective, may use MiraLAX. Encouraged to keep appointment with gastroenterologist in 2025.    7. Right hip pain.  - Diagnosed with trochanteric bursitis in the right hip. X-ray of the right hip

## 2025-05-18 ENCOUNTER — RESULTS FOLLOW-UP (OUTPATIENT)
Dept: INTERNAL MEDICINE CLINIC | Age: 85
End: 2025-05-18

## 2025-06-06 ENCOUNTER — TELEPHONE (OUTPATIENT)
Dept: ENDOCRINOLOGY | Age: 85
End: 2025-06-06

## 2025-06-09 NOTE — TELEPHONE ENCOUNTER
Please advise pt that her thyroid ultrasound results as read by radiologist recommends biopsy of the left lobe nodule   She can be scheduled in radiology or my clinic for that but I do see she is seeing Dr Velez in July and if she is considering surgical options then she should discuss with dr Velez

## 2025-06-10 NOTE — TELEPHONE ENCOUNTER
Pt called for message    States she's seeing Dr Velez next month and just wait to see what he says

## 2025-06-14 ENCOUNTER — PATIENT MESSAGE (OUTPATIENT)
Dept: ENDOCRINOLOGY | Age: 85
End: 2025-06-14

## 2025-07-03 DIAGNOSIS — I10 ESSENTIAL HYPERTENSION: ICD-10-CM

## 2025-07-07 RX ORDER — OLMESARTAN MEDOXOMIL 40 MG/1
40 TABLET ORAL DAILY
Qty: 90 TABLET | Refills: 3 | Status: SHIPPED | OUTPATIENT
Start: 2025-07-07 | End: 2026-01-03

## 2025-07-07 NOTE — TELEPHONE ENCOUNTER
Recent Visits  Date Type Provider Dept   05/06/25 [facility]   04/22/25 Office Visit Jn Stovall MD [facility]   11/14/24 Office Visit Jennifer Arshad, DO [facility]   06/12/24 Office Visit Naz Barry, APRN - CNP [facility]   05/03/24 Office Visit Jennifer Arshad, DO [facility]   04/09/24 Office Visit Jennifer Arshad, DO [facility]   02/05/24 Office Visit Jennifer Arshad, DO [facility]   Showing recent visits within past 540 days with a meds authorizing provider and meeting all other requirements  Future Appointments  Date Type Provider Dept   08/11/25 Appointment Jn Stovall MD [facility]   Showing future appointments within next 150 days with a meds authorizing provider and meeting all other requirements     11/14/2024

## 2025-07-25 ENCOUNTER — HOSPITAL ENCOUNTER (OUTPATIENT)
Age: 85
Discharge: HOME | End: 2025-07-25
Payer: MEDICARE

## 2025-07-25 LAB
ALBUMIN SERPL-MCNC: 3.8 G/DL (ref 3.4–5)
ALBUMIN/GLOB SERPL: 1.2 {RATIO} (ref 1.1–2.2)
ALP SERPL-CCNC: 57 U/L (ref 40–129)
ALT SERPL-CCNC: 11 U/L (ref 10–40)
ANION GAP SERPL CALCULATED.3IONS-SCNC: 10 MMOL/L (ref 3–16)
AST SERPL-CCNC: 21 U/L (ref 15–37)
BACTERIA URNS QL MICRO: NORMAL /HPF
BILIRUB SERPL-MCNC: 0.7 MG/DL (ref 0–1)
BILIRUB UR QL STRIP.AUTO: NEGATIVE
BUN SERPL-MCNC: 11 MG/DL (ref 7–20)
CALCIUM SERPL-MCNC: 9.2 MG/DL (ref 8.3–10.6)
CHLORIDE SERPL-SCNC: 104 MMOL/L (ref 99–110)
CLARITY UR: CLEAR
CO2 SERPL-SCNC: 25 MMOL/L (ref 21–32)
COLOR UR: YELLOW
CREAT SERPL-MCNC: 1.1 MG/DL (ref 0.6–1.2)
DEPRECATED RDW RBC AUTO: 13.9 % (ref 12.4–15.4)
EPI CELLS #/AREA URNS AUTO: 1 /HPF (ref 0–5)
GFR SERPLBLD CREATININE-BSD FMLA CKD-EPI: 50 ML/MIN/{1.73_M2}
GLUCOSE SERPL-MCNC: 95 MG/DL (ref 70–99)
GLUCOSE UR STRIP.AUTO-MCNC: NEGATIVE MG/DL
HCT VFR BLD AUTO: 37.3 % (ref 36–48)
HGB BLD-MCNC: 12.5 G/DL (ref 12–16)
HGB UR QL STRIP.AUTO: ABNORMAL
HYALINE CASTS #/AREA URNS AUTO: 0 /LPF (ref 0–8)
KETONES UR STRIP.AUTO-MCNC: NEGATIVE MG/DL
LEUKOCYTE ESTERASE UR QL STRIP.AUTO: ABNORMAL
MCH RBC QN AUTO: 27.8 PG (ref 26–34)
MCHC RBC AUTO-ENTMCNC: 33.5 G/DL (ref 31–36)
MCV RBC AUTO: 83.1 FL (ref 80–100)
NITRITE UR QL STRIP.AUTO: NEGATIVE
PH UR STRIP.AUTO: 6 [PH] (ref 5–8)
PHOSPHATE SERPL-MCNC: 2.7 MG/DL (ref 2.5–4.9)
PLATELET # BLD AUTO: 242 K/UL (ref 135–450)
PMV BLD AUTO: 8.6 FL (ref 5–10.5)
POTASSIUM SERPL-SCNC: 4.6 MMOL/L (ref 3.5–5.1)
PROT SERPL-MCNC: 7.1 G/DL (ref 6.4–8.2)
PROT UR STRIP.AUTO-MCNC: NEGATIVE MG/DL
PTH-INTACT SERPL-MCNC: 80.4 PG/ML (ref 14–72)
RBC # BLD AUTO: 4.48 M/UL (ref 4–5.2)
RBC CLUMPS #/AREA URNS AUTO: 1 /HPF (ref 0–4)
SODIUM SERPL-SCNC: 139 MMOL/L (ref 136–145)
SP GR UR STRIP.AUTO: <=1.005 (ref 1–1.03)
UA DIPSTICK W REFLEX MICRO PNL UR: YES
URN SPEC COLLECT METH UR: ABNORMAL
UROBILINOGEN UR STRIP-ACNC: 0.2 E.U./DL
WBC # BLD AUTO: 5.8 K/UL (ref 4–11)
WBC #/AREA URNS AUTO: 1 /HPF (ref 0–5)

## 2025-07-25 PROCEDURE — 84100 ASSAY OF PHOSPHORUS: CPT

## 2025-07-25 PROCEDURE — 85027 COMPLETE CBC AUTOMATED: CPT

## 2025-07-25 PROCEDURE — 80053 COMPREHEN METABOLIC PANEL: CPT

## 2025-07-25 PROCEDURE — 83970 ASSAY OF PARATHORMONE: CPT

## 2025-07-25 PROCEDURE — 81001 URINALYSIS AUTO W/SCOPE: CPT

## 2025-08-12 DIAGNOSIS — L85.3 DRY SKIN DERMATITIS: ICD-10-CM

## 2025-08-12 DIAGNOSIS — Z91.09 ENVIRONMENTAL ALLERGIES: ICD-10-CM

## 2025-08-12 DIAGNOSIS — J45.30 MILD PERSISTENT ASTHMA WITHOUT COMPLICATION: Primary | ICD-10-CM

## 2025-08-12 DIAGNOSIS — K21.9 GASTROESOPHAGEAL REFLUX DISEASE WITHOUT ESOPHAGITIS: ICD-10-CM

## 2025-08-12 RX ORDER — FLUTICASONE PROPIONATE 50 MCG
SPRAY, SUSPENSION (ML) NASAL
Qty: 16 G | Refills: 1 | OUTPATIENT
Start: 2025-08-12

## 2025-08-12 RX ORDER — FAMOTIDINE 20 MG/1
20 TABLET, FILM COATED ORAL 2 TIMES DAILY PRN
Qty: 180 TABLET | Refills: 1 | OUTPATIENT
Start: 2025-08-12

## 2025-08-12 RX ORDER — ALBUTEROL SULFATE 90 UG/1
2 INHALANT RESPIRATORY (INHALATION) EVERY 6 HOURS PRN
Qty: 18 G | Refills: 3 | OUTPATIENT
Start: 2025-08-12

## 2025-08-12 RX ORDER — HYDROCORTISONE 25 MG/G
CREAM TOPICAL
Qty: 28 G | Refills: 1 | OUTPATIENT
Start: 2025-08-12

## 2025-08-12 RX ORDER — HYDROCORTISONE 25 MG/G
CREAM TOPICAL
Qty: 56.7 G | Refills: 1 | OUTPATIENT
Start: 2025-08-12

## 2025-08-20 RX ORDER — HYDROCORTISONE 25 MG/G
CREAM TOPICAL
Qty: 28 G | Refills: 0 | Status: SHIPPED | OUTPATIENT
Start: 2025-08-20

## 2025-08-20 RX ORDER — FAMOTIDINE 20 MG/1
20 TABLET, FILM COATED ORAL 2 TIMES DAILY PRN
Qty: 180 TABLET | Refills: 0 | Status: SHIPPED | OUTPATIENT
Start: 2025-08-20

## 2025-08-20 RX ORDER — ALBUTEROL SULFATE 90 UG/1
2 INHALANT RESPIRATORY (INHALATION) EVERY 6 HOURS PRN
Qty: 18 G | Refills: 1 | Status: SHIPPED | OUTPATIENT
Start: 2025-08-20

## 2025-08-20 RX ORDER — FLUTICASONE PROPIONATE 50 MCG
2 SPRAY, SUSPENSION (ML) NASAL DAILY
Qty: 16 G | Refills: 1 | Status: SHIPPED | OUTPATIENT
Start: 2025-08-20

## 2025-08-20 RX ORDER — HYDROCORTISONE 25 MG/G
CREAM TOPICAL
Qty: 56.7 G | Refills: 1 | Status: SHIPPED | OUTPATIENT
Start: 2025-08-20

## 2025-08-26 ENCOUNTER — OFFICE VISIT (OUTPATIENT)
Dept: ENT CLINIC | Age: 85
End: 2025-08-26
Payer: MEDICARE

## 2025-08-26 VITALS
BODY MASS INDEX: 34.36 KG/M2 | SYSTOLIC BLOOD PRESSURE: 121 MMHG | DIASTOLIC BLOOD PRESSURE: 73 MMHG | HEIGHT: 60 IN | HEART RATE: 105 BPM | OXYGEN SATURATION: 96 % | WEIGHT: 175 LBS | TEMPERATURE: 97.7 F

## 2025-08-26 DIAGNOSIS — M26.623 BILATERAL TEMPOROMANDIBULAR JOINT PAIN: ICD-10-CM

## 2025-08-26 DIAGNOSIS — J35.8 TONSIL STONE: ICD-10-CM

## 2025-08-26 DIAGNOSIS — H61.22 IMPACTED CERUMEN OF LEFT EAR: ICD-10-CM

## 2025-08-26 DIAGNOSIS — J30.9 ALLERGIC RHINITIS, UNSPECIFIED SEASONALITY, UNSPECIFIED TRIGGER: ICD-10-CM

## 2025-08-26 DIAGNOSIS — M54.2 NECK DISCOMFORT: ICD-10-CM

## 2025-08-26 DIAGNOSIS — R13.10 DYSPHAGIA, UNSPECIFIED TYPE: ICD-10-CM

## 2025-08-26 DIAGNOSIS — E04.1 LEFT THYROID NODULE: Primary | ICD-10-CM

## 2025-08-26 PROCEDURE — G8417 CALC BMI ABV UP PARAM F/U: HCPCS | Performed by: STUDENT IN AN ORGANIZED HEALTH CARE EDUCATION/TRAINING PROGRAM

## 2025-08-26 PROCEDURE — 3074F SYST BP LT 130 MM HG: CPT | Performed by: STUDENT IN AN ORGANIZED HEALTH CARE EDUCATION/TRAINING PROGRAM

## 2025-08-26 PROCEDURE — 1036F TOBACCO NON-USER: CPT | Performed by: STUDENT IN AN ORGANIZED HEALTH CARE EDUCATION/TRAINING PROGRAM

## 2025-08-26 PROCEDURE — 31575 DIAGNOSTIC LARYNGOSCOPY: CPT | Performed by: STUDENT IN AN ORGANIZED HEALTH CARE EDUCATION/TRAINING PROGRAM

## 2025-08-26 PROCEDURE — 3078F DIAST BP <80 MM HG: CPT | Performed by: STUDENT IN AN ORGANIZED HEALTH CARE EDUCATION/TRAINING PROGRAM

## 2025-08-26 PROCEDURE — 69210 REMOVE IMPACTED EAR WAX UNI: CPT | Performed by: STUDENT IN AN ORGANIZED HEALTH CARE EDUCATION/TRAINING PROGRAM

## 2025-08-26 PROCEDURE — G8427 DOCREV CUR MEDS BY ELIG CLIN: HCPCS | Performed by: STUDENT IN AN ORGANIZED HEALTH CARE EDUCATION/TRAINING PROGRAM

## 2025-08-26 PROCEDURE — G8399 PT W/DXA RESULTS DOCUMENT: HCPCS | Performed by: STUDENT IN AN ORGANIZED HEALTH CARE EDUCATION/TRAINING PROGRAM

## 2025-08-26 PROCEDURE — 1159F MED LIST DOCD IN RCRD: CPT | Performed by: STUDENT IN AN ORGANIZED HEALTH CARE EDUCATION/TRAINING PROGRAM

## 2025-08-26 PROCEDURE — 1090F PRES/ABSN URINE INCON ASSESS: CPT | Performed by: STUDENT IN AN ORGANIZED HEALTH CARE EDUCATION/TRAINING PROGRAM

## 2025-08-26 PROCEDURE — 1123F ACP DISCUSS/DSCN MKR DOCD: CPT | Performed by: STUDENT IN AN ORGANIZED HEALTH CARE EDUCATION/TRAINING PROGRAM

## 2025-08-26 PROCEDURE — 99204 OFFICE O/P NEW MOD 45 MIN: CPT | Performed by: STUDENT IN AN ORGANIZED HEALTH CARE EDUCATION/TRAINING PROGRAM

## 2025-09-03 ENCOUNTER — TELEPHONE (OUTPATIENT)
Dept: ENT CLINIC | Age: 85
End: 2025-09-03

## 2025-09-03 DIAGNOSIS — M54.2 CERVICALGIA: Primary | ICD-10-CM

## 2025-09-04 ENCOUNTER — OFFICE VISIT (OUTPATIENT)
Dept: INTERNAL MEDICINE CLINIC | Age: 85
End: 2025-09-04
Payer: MEDICARE

## 2025-09-04 VITALS
OXYGEN SATURATION: 98 % | SYSTOLIC BLOOD PRESSURE: 122 MMHG | WEIGHT: 174.4 LBS | HEART RATE: 82 BPM | BODY MASS INDEX: 34.06 KG/M2 | DIASTOLIC BLOOD PRESSURE: 80 MMHG

## 2025-09-04 DIAGNOSIS — N18.31 STAGE 3A CHRONIC KIDNEY DISEASE (HCC): ICD-10-CM

## 2025-09-04 DIAGNOSIS — L98.9 FACE LESION: ICD-10-CM

## 2025-09-04 DIAGNOSIS — I10 ESSENTIAL HYPERTENSION: Primary | ICD-10-CM

## 2025-09-04 DIAGNOSIS — E78.2 MIXED HYPERLIPIDEMIA: ICD-10-CM

## 2025-09-04 DIAGNOSIS — R73.03 PREDIABETES: ICD-10-CM

## 2025-09-04 DIAGNOSIS — M54.12 CERVICAL RADICULOPATHY: ICD-10-CM

## 2025-09-04 PROCEDURE — 1160F RVW MEDS BY RX/DR IN RCRD: CPT | Performed by: INTERNAL MEDICINE

## 2025-09-04 PROCEDURE — 3074F SYST BP LT 130 MM HG: CPT | Performed by: INTERNAL MEDICINE

## 2025-09-04 PROCEDURE — 1123F ACP DISCUSS/DSCN MKR DOCD: CPT | Performed by: INTERNAL MEDICINE

## 2025-09-04 PROCEDURE — G2211 COMPLEX E/M VISIT ADD ON: HCPCS | Performed by: INTERNAL MEDICINE

## 2025-09-04 PROCEDURE — 99214 OFFICE O/P EST MOD 30 MIN: CPT | Performed by: INTERNAL MEDICINE

## 2025-09-04 PROCEDURE — 1090F PRES/ABSN URINE INCON ASSESS: CPT | Performed by: INTERNAL MEDICINE

## 2025-09-04 PROCEDURE — 3079F DIAST BP 80-89 MM HG: CPT | Performed by: INTERNAL MEDICINE

## 2025-09-04 PROCEDURE — G8399 PT W/DXA RESULTS DOCUMENT: HCPCS | Performed by: INTERNAL MEDICINE

## 2025-09-04 PROCEDURE — G8427 DOCREV CUR MEDS BY ELIG CLIN: HCPCS | Performed by: INTERNAL MEDICINE

## 2025-09-04 PROCEDURE — 1159F MED LIST DOCD IN RCRD: CPT | Performed by: INTERNAL MEDICINE

## 2025-09-04 PROCEDURE — G8417 CALC BMI ABV UP PARAM F/U: HCPCS | Performed by: INTERNAL MEDICINE

## 2025-09-04 PROCEDURE — 1036F TOBACCO NON-USER: CPT | Performed by: INTERNAL MEDICINE

## 2025-09-04 ASSESSMENT — ENCOUNTER SYMPTOMS
SINUS PAIN: 0
BLOOD IN STOOL: 0
SHORTNESS OF BREATH: 0
ABDOMINAL PAIN: 0
COLOR CHANGE: 0
COUGH: 0
CONSTIPATION: 0
CHEST TIGHTNESS: 0